# Patient Record
Sex: MALE | Race: OTHER | NOT HISPANIC OR LATINO
[De-identification: names, ages, dates, MRNs, and addresses within clinical notes are randomized per-mention and may not be internally consistent; named-entity substitution may affect disease eponyms.]

---

## 2021-01-04 PROBLEM — Z00.00 ENCOUNTER FOR PREVENTIVE HEALTH EXAMINATION: Status: ACTIVE | Noted: 2021-01-04

## 2021-01-07 ENCOUNTER — APPOINTMENT (OUTPATIENT)
Dept: ORTHOPEDIC SURGERY | Facility: CLINIC | Age: 66
End: 2021-01-07
Payer: COMMERCIAL

## 2021-01-07 VITALS
DIASTOLIC BLOOD PRESSURE: 95 MMHG | HEART RATE: 67 BPM | BODY MASS INDEX: 28.93 KG/M2 | SYSTOLIC BLOOD PRESSURE: 155 MMHG | WEIGHT: 180 LBS | HEIGHT: 66 IN

## 2021-01-07 DIAGNOSIS — M25.562 PAIN IN LEFT KNEE: ICD-10-CM

## 2021-01-07 DIAGNOSIS — M17.12 UNILATERAL PRIMARY OSTEOARTHRITIS, LEFT KNEE: ICD-10-CM

## 2021-01-07 PROCEDURE — 99072 ADDL SUPL MATRL&STAF TM PHE: CPT

## 2021-01-07 PROCEDURE — 99204 OFFICE O/P NEW MOD 45 MIN: CPT | Mod: 25

## 2021-01-07 PROCEDURE — 20610 DRAIN/INJ JOINT/BURSA W/O US: CPT | Mod: LT

## 2021-01-07 PROCEDURE — 73564 X-RAY EXAM KNEE 4 OR MORE: CPT | Mod: LT

## 2021-01-07 NOTE — REASON FOR VISIT
[Initial Visit] : an initial visit for [Other: ____] : [unfilled] [[employer] ] : provided by [employer]   [TWNoteComboBox1] : Polish

## 2021-01-07 NOTE — PHYSICAL EXAM
[de-identified] : The patient appears well nourished  and in no apparent distress.  The patient is alert and oriented to person, place, and time.   Affect and mood appear normal. The head is normocephalic and atraumatic.  The eyes reveal normal sclera and extra ocular muscles are intact. The tongue is midline with no apparent lesions.  Skin shows normal turgor with no evidence of eczema or psoriasis.  No respiratory distress noted.  Sensation grossly intact.\par   [de-identified] : Exam of the left knee shows a small effusion, full extension, pain with Jairo testing, tenderness to palpation of the medial joint line, pain with deep flexion to 120 degrees of flexion. 5/5 motor strength bilaterally distally. Sensation intact distally.  [de-identified] : Xray- 4 views of the left knee shows moderate to severe arthritis of the medial compartment of the left knee.

## 2021-01-07 NOTE — CONSULT LETTER
[Dear  ___] : Dear  [unfilled], [Consult Letter:] : I had the pleasure of evaluating your patient, [unfilled]. [Please see my note below.] : Please see my note below. [Consult Closing:] : Thank you very much for allowing me to participate in the care of this patient.  If you have any questions, please do not hesitate to contact me. [Sincerely,] : Sincerely, [FreeTextEntry2] : Susan Murphy MD [FreeTextEntry3] : Rudy Ayala MD\par Chief of Joint Replacement\par Primary [facility] Knee Replacement \par [facility]\par \par

## 2021-01-07 NOTE — DISCUSSION/SUMMARY
[de-identified] : The patient is a 65 year old male with moderate to severe arthritis of the medial compartment of the left knee. Conservative options were discussed. He was given a cortisone injection in the left knee today. He was given a prescription for physical therapy and anti-inflammatories. I recommended a course of Mobic. The patient was given a prescription for the Mobic with directions. He was instructed to stop the medicine and call the office if there are any adverse reaction to the medicine. He was also instructed to consult with their primary care doctor prior to starting the medication. He may follow up as needed.

## 2021-01-07 NOTE — PROCEDURE
[de-identified] : Using sterile technique, 2cc of depomedrol 40mg/ml, 4cc of 1% plain lidocaine, and 2 cc 0.25% marcaine was drawn up into a sterile syringe. The left knee was then sterilely prepped with chlorhexidine. Ethyl chloride spray was used to anesthetize the skin and subQ tissue. The depomedrol/lidocaine/marcaine mixture was then injected into the knee joint in the anterolateral position. The patient tolerated the procedure well without difficulty. The patient was given instructions on the use of ice and anti-inflammatories post injection site soreness.\par \par

## 2021-01-07 NOTE — ADDENDUM
[FreeTextEntry1] : This note was authored by David Russell working as a medical scribe for Dr. Rudy Ayala. The note was reviewed, edited, and revised by Dr. Rudy Ayala whom is in agreement with the exam findings, imaging findings, and treatment plan. 01/07/2021.

## 2021-01-07 NOTE — HISTORY OF PRESENT ILLNESS
[de-identified] : The patient is a 65 year old American-speaking male being seen for evaluation of his left knee. He denies injury, trauma, or change of activity. He reports a 3-4 month duration of left knee pain which is intermittent in nature. He reports stabbing and throbbing sensations which can be severe at times. He reports pain increases with activities such as walking for long durations of time. He reports pain is localized to the medial knee, however it can be global at times. He notes swelling at times. Pain is worse with transferring and stair climbing. He comes in today for evaluation of his left knee and for treatment options.

## 2021-02-02 ENCOUNTER — RX RENEWAL (OUTPATIENT)
Age: 66
End: 2021-02-02

## 2021-03-02 ENCOUNTER — RX RENEWAL (OUTPATIENT)
Age: 66
End: 2021-03-02

## 2021-03-30 ENCOUNTER — RX RENEWAL (OUTPATIENT)
Age: 66
End: 2021-03-30

## 2021-04-27 ENCOUNTER — RX RENEWAL (OUTPATIENT)
Age: 66
End: 2021-04-27

## 2023-08-01 ENCOUNTER — INPATIENT (INPATIENT)
Facility: HOSPITAL | Age: 68
LOS: 1 days | Discharge: HOME HEALTH | DRG: 694 | End: 2023-08-03
Attending: STUDENT IN AN ORGANIZED HEALTH CARE EDUCATION/TRAINING PROGRAM | Admitting: HOSPITALIST
Payer: COMMERCIAL

## 2023-08-01 VITALS
RESPIRATION RATE: 18 BRPM | DIASTOLIC BLOOD PRESSURE: 80 MMHG | OXYGEN SATURATION: 99 % | WEIGHT: 163.36 LBS | SYSTOLIC BLOOD PRESSURE: 127 MMHG | HEART RATE: 56 BPM | TEMPERATURE: 98 F

## 2023-08-01 LAB
ALBUMIN SERPL ELPH-MCNC: 3.2 G/DL — LOW (ref 3.3–5.2)
ALP SERPL-CCNC: 105 U/L — SIGNIFICANT CHANGE UP (ref 40–120)
ALT FLD-CCNC: 31 U/L — SIGNIFICANT CHANGE UP
ANION GAP SERPL CALC-SCNC: 14 MMOL/L — SIGNIFICANT CHANGE UP (ref 5–17)
APPEARANCE UR: ABNORMAL
AST SERPL-CCNC: 33 U/L — SIGNIFICANT CHANGE UP
BACTERIA # UR AUTO: ABNORMAL
BASOPHILS # BLD AUTO: 0.03 K/UL — SIGNIFICANT CHANGE UP (ref 0–0.2)
BASOPHILS NFR BLD AUTO: 0.3 % — SIGNIFICANT CHANGE UP (ref 0–2)
BILIRUB SERPL-MCNC: 0.5 MG/DL — SIGNIFICANT CHANGE UP (ref 0.4–2)
BILIRUB UR-MCNC: NEGATIVE — SIGNIFICANT CHANGE UP
BUN SERPL-MCNC: 15.6 MG/DL — SIGNIFICANT CHANGE UP (ref 8–20)
CALCIUM SERPL-MCNC: 8.5 MG/DL — SIGNIFICANT CHANGE UP (ref 8.4–10.5)
CHLORIDE SERPL-SCNC: 91 MMOL/L — LOW (ref 96–108)
CO2 SERPL-SCNC: 25 MMOL/L — SIGNIFICANT CHANGE UP (ref 22–29)
COLOR SPEC: YELLOW — SIGNIFICANT CHANGE UP
CREAT SERPL-MCNC: 1.28 MG/DL — SIGNIFICANT CHANGE UP (ref 0.5–1.3)
DIFF PNL FLD: ABNORMAL
EGFR: 62 ML/MIN/1.73M2 — SIGNIFICANT CHANGE UP
EOSINOPHIL # BLD AUTO: 0.1 K/UL — SIGNIFICANT CHANGE UP (ref 0–0.5)
EOSINOPHIL NFR BLD AUTO: 1 % — SIGNIFICANT CHANGE UP (ref 0–6)
EPI CELLS # UR: SIGNIFICANT CHANGE UP
GLUCOSE SERPL-MCNC: 338 MG/DL — HIGH (ref 70–99)
GLUCOSE UR QL: 250 MG/DL
HCT VFR BLD CALC: 38.4 % — LOW (ref 39–50)
HGB BLD-MCNC: 13 G/DL — SIGNIFICANT CHANGE UP (ref 13–17)
IMM GRANULOCYTES NFR BLD AUTO: 0.9 % — SIGNIFICANT CHANGE UP (ref 0–0.9)
KETONES UR-MCNC: NEGATIVE — SIGNIFICANT CHANGE UP
LACTATE BLDV-MCNC: 1.7 MMOL/L — SIGNIFICANT CHANGE UP (ref 0.5–2)
LACTATE BLDV-MCNC: 3 MMOL/L — HIGH (ref 0.5–2)
LEUKOCYTE ESTERASE UR-ACNC: NEGATIVE — SIGNIFICANT CHANGE UP
LIDOCAIN IGE QN: 45 U/L — SIGNIFICANT CHANGE UP (ref 22–51)
LYMPHOCYTES # BLD AUTO: 1.1 K/UL — SIGNIFICANT CHANGE UP (ref 1–3.3)
LYMPHOCYTES # BLD AUTO: 11.2 % — LOW (ref 13–44)
MCHC RBC-ENTMCNC: 29.3 PG — SIGNIFICANT CHANGE UP (ref 27–34)
MCHC RBC-ENTMCNC: 33.9 GM/DL — SIGNIFICANT CHANGE UP (ref 32–36)
MCV RBC AUTO: 86.5 FL — SIGNIFICANT CHANGE UP (ref 80–100)
MONOCYTES # BLD AUTO: 1.01 K/UL — HIGH (ref 0–0.9)
MONOCYTES NFR BLD AUTO: 10.2 % — SIGNIFICANT CHANGE UP (ref 2–14)
NEUTROPHILS # BLD AUTO: 7.53 K/UL — HIGH (ref 1.8–7.4)
NEUTROPHILS NFR BLD AUTO: 76.4 % — SIGNIFICANT CHANGE UP (ref 43–77)
NITRITE UR-MCNC: NEGATIVE — SIGNIFICANT CHANGE UP
PH UR: 5 — SIGNIFICANT CHANGE UP (ref 5–8)
PLATELET # BLD AUTO: 300 K/UL — SIGNIFICANT CHANGE UP (ref 150–400)
POTASSIUM SERPL-MCNC: 4.3 MMOL/L — SIGNIFICANT CHANGE UP (ref 3.5–5.3)
POTASSIUM SERPL-SCNC: 4.3 MMOL/L — SIGNIFICANT CHANGE UP (ref 3.5–5.3)
PROT SERPL-MCNC: 6.6 G/DL — SIGNIFICANT CHANGE UP (ref 6.6–8.7)
PROT UR-MCNC: 30 MG/DL
RBC # BLD: 4.44 M/UL — SIGNIFICANT CHANGE UP (ref 4.2–5.8)
RBC # FLD: 12.9 % — SIGNIFICANT CHANGE UP (ref 10.3–14.5)
RBC CASTS # UR COMP ASSIST: SIGNIFICANT CHANGE UP /HPF (ref 0–4)
SODIUM SERPL-SCNC: 130 MMOL/L — LOW (ref 135–145)
SP GR SPEC: 1.01 — SIGNIFICANT CHANGE UP (ref 1.01–1.02)
UROBILINOGEN FLD QL: NEGATIVE MG/DL — SIGNIFICANT CHANGE UP
WBC # BLD: 9.86 K/UL — SIGNIFICANT CHANGE UP (ref 3.8–10.5)
WBC # FLD AUTO: 9.86 K/UL — SIGNIFICANT CHANGE UP (ref 3.8–10.5)
WBC UR QL: SIGNIFICANT CHANGE UP /HPF (ref 0–5)

## 2023-08-01 PROCEDURE — 99223 1ST HOSP IP/OBS HIGH 75: CPT

## 2023-08-01 PROCEDURE — 72131 CT LUMBAR SPINE W/O DYE: CPT | Mod: 26,MA

## 2023-08-01 PROCEDURE — 93010 ELECTROCARDIOGRAM REPORT: CPT

## 2023-08-01 PROCEDURE — 74177 CT ABD & PELVIS W/CONTRAST: CPT | Mod: 26,MA

## 2023-08-01 RX ORDER — SODIUM CHLORIDE 9 MG/ML
1000 INJECTION INTRAMUSCULAR; INTRAVENOUS; SUBCUTANEOUS ONCE
Refills: 0 | Status: COMPLETED | OUTPATIENT
Start: 2023-08-01 | End: 2023-08-01

## 2023-08-01 RX ORDER — ATORVASTATIN CALCIUM 80 MG/1
10 TABLET, FILM COATED ORAL AT BEDTIME
Refills: 0 | Status: DISCONTINUED | OUTPATIENT
Start: 2023-08-01 | End: 2023-08-02

## 2023-08-01 RX ORDER — MORPHINE SULFATE 50 MG/1
4 CAPSULE, EXTENDED RELEASE ORAL ONCE
Refills: 0 | Status: DISCONTINUED | OUTPATIENT
Start: 2023-08-01 | End: 2023-08-01

## 2023-08-01 RX ORDER — ONDANSETRON 8 MG/1
4 TABLET, FILM COATED ORAL ONCE
Refills: 0 | Status: COMPLETED | OUTPATIENT
Start: 2023-08-01 | End: 2023-08-01

## 2023-08-01 RX ORDER — LISINOPRIL 2.5 MG/1
20 TABLET ORAL DAILY
Refills: 0 | Status: DISCONTINUED | OUTPATIENT
Start: 2023-08-01 | End: 2023-08-02

## 2023-08-01 RX ORDER — ATENOLOL 25 MG/1
50 TABLET ORAL DAILY
Refills: 0 | Status: DISCONTINUED | OUTPATIENT
Start: 2023-08-01 | End: 2023-08-02

## 2023-08-01 RX ADMIN — SODIUM CHLORIDE 1000 MILLILITER(S): 9 INJECTION INTRAMUSCULAR; INTRAVENOUS; SUBCUTANEOUS at 06:57

## 2023-08-01 RX ADMIN — ONDANSETRON 4 MILLIGRAM(S): 8 TABLET, FILM COATED ORAL at 04:51

## 2023-08-01 RX ADMIN — MORPHINE SULFATE 4 MILLIGRAM(S): 50 CAPSULE, EXTENDED RELEASE ORAL at 04:51

## 2023-08-01 RX ADMIN — SODIUM CHLORIDE 1000 MILLILITER(S): 9 INJECTION INTRAMUSCULAR; INTRAVENOUS; SUBCUTANEOUS at 05:38

## 2023-08-01 RX ADMIN — ATORVASTATIN CALCIUM 10 MILLIGRAM(S): 80 TABLET, FILM COATED ORAL at 21:15

## 2023-08-01 NOTE — ED PROVIDER NOTE - NS ED ATTENDING STATEMENT MOD
This was a shared visit with the SYBIL. I reviewed and verified the documentation and independently performed the documented:

## 2023-08-01 NOTE — ED ADULT TRIAGE NOTE - CHIEF COMPLAINT QUOTE
c/o of left lower back pain. Was dx with a back tumor in [facility] a few days ago. Pt reports pain is unbearable

## 2023-08-01 NOTE — ED ADULT NURSE REASSESSMENT NOTE - NS ED NURSE REASSESS COMMENT FT1
pt care assumed at 0730, no apparent distress noted at this time, charting as noted. Pt received A&Ox4 resting in bed comfortably at this time. Pt is awaiting CT. Pt states pain is controlled on initial assessment.

## 2023-08-01 NOTE — ED PROVIDER NOTE - CLINICAL SUMMARY MEDICAL DECISION MAKING FREE TEXT BOX
67 year old male with med hx of htn presented to ED c/o left low back pain. r/o colon mass, GI bleed, kidney stone, spinal tumor 67 year old male with med hx of htn presented to ED c/o left low back pain. r/o colon mass, GI bleed, kidney stone, spinal tumor    [place]: CT with left ureteral mass. urology recommending IR nephrostomy tube. Able to place PCN tomorrow. Placed in observation

## 2023-08-01 NOTE — ED ADULT NURSE NOTE - TEMPLATE
What Type Of Note Output Would You Prefer (Optional)?: Standard Output How Severe Is Your Acne?: moderate Is This A New Presentation, Or A Follow-Up?: Acne Back Pain

## 2023-08-01 NOTE — ED ADULT NURSE NOTE - OBJECTIVE STATEMENT
Assumed pt care 0445. A&Ox4. PT granddaughter at bedside. Pt c/o left worsening lower back pain associated with nausea. Pt states he returned from [facility] today where he was diagnosed with color cancer. Pt denies fevers/chills, SOB, HA, vomiting/diarrhea, cough. PMH of HTN. Respirations even & unlabored. NAD. Pt made aware of plan of care and verbalized understanding.

## 2023-08-01 NOTE — ED PROVIDER NOTE - ATTENDING APP SHARED VISIT CONTRIBUTION OF CARE
non toxic appearing adult male with lower back pain in setting of recent diagnosis of metastatic CA at outside hospital; non toxic, no icterus, normal mucosa; normal heart and lung sounds; abd softly distended; no asia no guarding; no jaundice; awaiting final ct imaging at time of sign out    This was a shared visit with SYBIL. I reviewed and verified the documentation and independently performed the documented history/exam/mdm.

## 2023-08-01 NOTE — ED ADULT NURSE REASSESSMENT NOTE - NS ED NURSE REASSESS COMMENT FT1
Received pt from ED RN, pt alert and oriented, in NAD, breathing is even and unlabored. PT offers no complaints at this time, will continue to follow POC

## 2023-08-01 NOTE — ED CDU PROVIDER INITIAL DAY NOTE - CLINICAL SUMMARY MEDICAL DECISION MAKING FREE TEXT BOX
Abnormal CT: +Apparent 0.7 x 1.1 x 1.8 cm enhancing mass lesion in the left distal ureter with associated moderate to severe left hydroureteronephrosis.  Apparent mural thickening versus underdistention of the stomach. If clinically indicated, EGD may be pursued for further evaluation. Mild ascites in the abdomen and pelvis. Omental densities bilaterally may represent omental carcinomatosis.    Seen by urology, no acute intervention, recommend outpatient cystoscopy    Case reviewed by IR, for nephrostomy tube placement

## 2023-08-01 NOTE — ED ADULT NURSE REASSESSMENT NOTE - NS ED NURSE REASSESS COMMENT FT1
pt resting comfortably on stretcher with eyes closed. no acute distress, rounds frequently provided for pt comfort and safety. does not offer acute complaints. to be NPO at midnight awaiting IR 8/2/203.

## 2023-08-01 NOTE — ED ADULT NURSE REASSESSMENT NOTE - NS ED NURSE REASSESS COMMENT FT1
pt handed off to GAURANG Knox in stable condition. pt in no apparent distress noted at this time. vital signs stable. pt transferred to obs in stable condition.

## 2023-08-01 NOTE — ED CDU PROVIDER INITIAL DAY NOTE - ATTENDING APP SHARED VISIT CONTRIBUTION OF CARE
I, Nolberto Martinez, have personally performed a face to face diagnostic evaluation on this patient. I have reviewed the SYBIL note and agree with the history, exam and plan of care, except as noted.    66 yo M  presents to the ED with 2 weeks of left flank pain. CT showed mass to the distal urether causing hydronephoriss. patient seen by urology. will need nephrostomy tube. renal function wnl. patient placed in Obs for IR nephrostomy tomorrow.

## 2023-08-01 NOTE — ED PROVIDER NOTE - PHYSICAL EXAMINATION
Physical Examination:   Gen: NAD, AOx3  Head: NCAT  HEENT: EOMI, oral mucosa moist, normal conjunctiva, neck supple  Lung: no respiratory distress  CV:  Normal perfusion  Abd: soft, NT ND  MSK: + left cva tenderness   Neuro: No focal neurologic deficits  Skin: No rash   Psych: normal affect

## 2023-08-01 NOTE — CONSULT NOTE ADULT - NS ATTEND AMEND GEN_ALL_CORE FT
Given the radiographic studies and the delfina distal left ureteral tumor he needs left neph tube to protect renal function on left.  The patient will likely require chemo and may need a distal ureterectomy.  Will discuss further once initial resuscitation done.

## 2023-08-01 NOTE — CONSULT NOTE ADULT - ASSESSMENT
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 Gregory
Patient is 67-year-old male admitted to observation with left distal ureteral mass and hydroureteronephrosis. IR consulted for left nephrostomy tube placement. Labs and imaging reviewed by IR attending. We will plan for nephrostomy placement in AM. Please keep patient NPO. Please hold all anticoagulation medication    Please call extension [phone] with any questions, concerns or issues regarding above.

## 2023-08-01 NOTE — ED ADULT NURSE REASSESSMENT NOTE - NS ED NURSE REASSESS COMMENT FT1
Pt is resting in bed comfortably at this time, no apparent distress noted at this time. pt safety maintained. Pt denies any complaints at this time. pt updated on plan of care.

## 2023-08-01 NOTE — ED PROVIDER NOTE - PROGRESS NOTE DETAILS
pt received in s/o pending CT. CT with mass at the left ureter with hydro. Cr 1.2. On labs from last week in [facility], Cr 2.2.   Notified urology, who will eval pt.

## 2023-08-01 NOTE — ED ADULT NURSE NOTE - CHIEF COMPLAINT QUOTE
c/o of left lower back pain. Was dx with a back tumor in [facility] a few days ago. Pt reports pain is unbearable

## 2023-08-01 NOTE — ED CDU PROVIDER INITIAL DAY NOTE - OBJECTIVE STATEMENT
68 yo male presents to the ED with 2 weeks of left flank pain.  Pt was in [place] [place] on vacation when the pain started.  Pt went to an urgent care and was sent to the hospital.  He was told there was a mass ? colon. His creatinine was 2.2, now 1.28 today.  Pt's son advised he come back to [place] for further work up and treatment.  Upon further interviewing of the patient, he stated that when in the hospital in [place], he had a tube placed in his abdomen that was draining a lot of fluid. Pt describing 3L of fluid, was removed the following day.  He also had a hard time moving his bowels.  Pt has nausea when the pain is severe.  Pt denies any hematuria, voids w/o difficulty.  He was a smoker a long time ago.  No fever or chills. CT scan today shows mod to sev hydronephrosis on the left and a distal ureteral mass causing the hydro. Possible omental carcinomatosis.

## 2023-08-01 NOTE — ED PROVIDER NOTE - OBJECTIVE STATEMENT
67 year old male with no med hx presented to ED c/o left low back pain. daughter states he was recently visiting [facility], 67 year old male with med hx of htn presented to ED c/o left low back pain. daughter states he was recently visiting [facility], was experiencing left low back pain, reported to hospital. was diagnosed with "colon cancer". came back to [place] for additional opinions. flew back today. pain has now increased. admits to nausea, vomiting. denies chest pain, sob, abdominal pain, numbness, tingling, fever/chills, trauma/ injury. has never followed with GI or cards. unknown last colonoscopy. contray to triage was not diagnosed with spinal mass. denies IVDU, incontinence, saddle anesthesia   has been taking pain meds from hospital unsure names

## 2023-08-01 NOTE — ED ADULT NURSE NOTE - NSFALLUNIVINTERV_ED_ALL_ED
Bed/Stretcher in lowest position, wheels locked, appropriate side rails in place/Call bell, personal items and telephone in reach/Instruct patient to call for assistance before getting out of bed/chair/stretcher/Non-slip footwear applied when patient is off stretcher/[place] to call system/Physically safe environment - no spills, clutter or unnecessary equipment/Purposeful proactive rounding/Room/bathroom lighting operational, light cord in reach

## 2023-08-02 DIAGNOSIS — N28.89 OTHER SPECIFIED DISORDERS OF KIDNEY AND URETER: ICD-10-CM

## 2023-08-02 LAB
ALBUMIN SERPL ELPH-MCNC: 2.8 G/DL — LOW (ref 3.3–5.2)
ALP SERPL-CCNC: 129 U/L — HIGH (ref 40–120)
ALT FLD-CCNC: 60 U/L — HIGH
ANION GAP SERPL CALC-SCNC: 10 MMOL/L — SIGNIFICANT CHANGE UP (ref 5–17)
APTT BLD: 26 SEC — SIGNIFICANT CHANGE UP (ref 24.5–35.6)
AST SERPL-CCNC: 54 U/L — HIGH
BILIRUB SERPL-MCNC: 0.4 MG/DL — SIGNIFICANT CHANGE UP (ref 0.4–2)
BUN SERPL-MCNC: 16 MG/DL — SIGNIFICANT CHANGE UP (ref 8–20)
CALCIUM SERPL-MCNC: 8.3 MG/DL — LOW (ref 8.4–10.5)
CHLORIDE SERPL-SCNC: 95 MMOL/L — LOW (ref 96–108)
CO2 SERPL-SCNC: 26 MMOL/L — SIGNIFICANT CHANGE UP (ref 22–29)
CREAT SERPL-MCNC: 1.75 MG/DL — HIGH (ref 0.5–1.3)
EGFR: 43 ML/MIN/1.73M2 — LOW
GLUCOSE SERPL-MCNC: 186 MG/DL — HIGH (ref 70–99)
INR BLD: 0.94 RATIO — SIGNIFICANT CHANGE UP (ref 0.85–1.18)
POTASSIUM SERPL-MCNC: 5.2 MMOL/L — SIGNIFICANT CHANGE UP (ref 3.5–5.3)
POTASSIUM SERPL-SCNC: 5.2 MMOL/L — SIGNIFICANT CHANGE UP (ref 3.5–5.3)
PROT SERPL-MCNC: 5.6 G/DL — LOW (ref 6.6–8.7)
PROTHROM AB SERPL-ACNC: 10.4 SEC — SIGNIFICANT CHANGE UP (ref 9.5–13)
SODIUM SERPL-SCNC: 131 MMOL/L — LOW (ref 135–145)

## 2023-08-02 PROCEDURE — 50432 PLMT NEPHROSTOMY CATHETER: CPT | Mod: LT

## 2023-08-02 PROCEDURE — 99223 1ST HOSP IP/OBS HIGH 75: CPT

## 2023-08-02 PROCEDURE — 99233 SBSQ HOSP IP/OBS HIGH 50: CPT

## 2023-08-02 RX ORDER — AMLODIPINE BESYLATE 2.5 MG/1
5 TABLET ORAL DAILY
Refills: 0 | Status: DISCONTINUED | OUTPATIENT
Start: 2023-08-02 | End: 2023-08-03

## 2023-08-02 RX ORDER — SENNA PLUS 8.6 MG/1
2 TABLET ORAL AT BEDTIME
Refills: 0 | Status: DISCONTINUED | OUTPATIENT
Start: 2023-08-02 | End: 2023-08-03

## 2023-08-02 RX ORDER — POLYETHYLENE GLYCOL 3350 17 G/17G
17 POWDER, FOR SOLUTION ORAL DAILY
Refills: 0 | Status: DISCONTINUED | OUTPATIENT
Start: 2023-08-02 | End: 2023-08-03

## 2023-08-02 RX ORDER — ACETAMINOPHEN 500 MG
650 TABLET ORAL EVERY 6 HOURS
Refills: 0 | Status: DISCONTINUED | OUTPATIENT
Start: 2023-08-02 | End: 2023-08-03

## 2023-08-02 RX ORDER — OLMESARTAN MEDOXOMIL 5 MG/1
1 TABLET, FILM COATED ORAL
Refills: 0 | DISCHARGE

## 2023-08-02 RX ADMIN — SENNA PLUS 2 TABLET(S): 8.6 TABLET ORAL at 22:04

## 2023-08-02 RX ADMIN — ATENOLOL 50 MILLIGRAM(S): 25 TABLET ORAL at 05:06

## 2023-08-02 NOTE — ED ADULT NURSE REASSESSMENT NOTE - NS ED NURSE REASSESS COMMENT FT1
pt resting comfortably on stretcher with eyes closed. no acute distress, rounds frequently provided for pt comfort and safety. does not offer acute complaints, denies pain at this time. NPO status maintained awaiting IR today.

## 2023-08-02 NOTE — ED CDU PROVIDER DISPOSITION NOTE - CLINICAL COURSE
66yo M presents to ED for progressive back pain. found to have new findings for hydronephrosis and uretal mass on CT. placed in OBS for uro consult. uro recommended nephrostomy tube with IR. while in obs found to have elevated LFT. pt to be admitted due to concerns for poor outpt f/u. pt progressively declining and has poor insight. oncology, GI and nephrology consulted. reviewed all results with pt. pt verbalized understanding and agreement

## 2023-08-02 NOTE — ED CDU PROVIDER SUBSEQUENT DAY NOTE - ATTENDING APP SHARED VISIT CONTRIBUTION OF CARE
Elayne MELGAR 67-year-old male with history of abdominal pain and and found to have an abdominal mass in the left kidney.  Patient was placed in observation for IR drainage and nephrostomy by IR.  Patient is feeling weak and tired    Patient is alert well-appearing male in no acute distress no events overnight neuro exam is alert oriented x3 no focal deficits skin warm dry good turgor    Plan to admit the patient as patient has deranged LFTs and had a recent paracentesis also has low albumin and need oncology work-up.  Patient has not seen any oncologist urology also recommended to admit to medicine.    ED  Kelsi Cash

## 2023-08-02 NOTE — H&P ADULT - HISTORY OF PRESENT ILLNESS
67M who presented with left sided back pain.  67M who presented with left sided back pain. He had been on vacation in [facility] when the symptoms began. He was evaluated at the hospital and underwent paracentesis and imaging and was informed of concern for possible colon cancer. He return to [place] and presented to the hospital with persistent pain. Upon initial evaluation, he was noted to have left sided hydronephrosis and was placed under observational status in th emergency department with plans for a nephrostomy tube. The patient denied any change in urination, hematuria, or dysuria. There were no recent fevers, chills, or sick contacts. He did attest to a 10 pound weight loss over the past two weeks. He denied any change in his bowel habits. He reported a colonoscopy four years ago and was scheduled for a repeat colonoscopy in ten years. He denied any history of scites in the past and denied any episodes of jaundice.

## 2023-08-02 NOTE — PROGRESS NOTE ADULT - ASSESSMENT
66 yo male with ureteral mass, left hydronephrosis, DESI  - IR for placement on L NT today  - trend renal fcn  - if renal fcn worsens, rec medical admission for IVF and renal fcn monitoring  - if pt remains stable and able to be d/c'd, pt need to f/u with Dr. Cliff Gregory in 1 week

## 2023-08-02 NOTE — H&P ADULT - NSHPPHYSICALEXAM_GEN_ALL_CORE
Vital Signs Last 24 Hrs  T(C): 36.7 (02 Aug 2023 08:29), Max: 36.8 (01 Aug 2023 19:55)  T(F): 98 (02 Aug 2023 08:29), Max: 98.3 (01 Aug 2023 19:55)  HR: 66 (02 Aug 2023 08:29) (66 - 95)  BP: 116/74 (02 Aug 2023 08:29) (101/69 - 148/85)  BP(mean): --  RR: 18 (02 Aug 2023 08:29) (16 - 18)  SpO2: 98% (02 Aug 2023 08:29) (96% - 98%)    Parameters below as of 02 Aug 2023 08:29  Patient On (Oxygen Delivery Method): room air    General appearance: No acute distress, Awake, Alert  HEENT: Normocephalic, Atraumatic, Conjunctiva clear, EOMI  Neck: Supple, No JVD, No tenderness  Lungs: Breath sound equal bilaterally, No wheezes, No rales  Cardiovascular: S1S2, Regular rhythm  Abdomen: Soft, Nontender, Nondistended, No guarding/rebound, Positive bowel sounds, Left costovertebral angle tenderness  Extremities: No clubbing, No cyanosis, No edema, No calf tenderness  Neuro: Strength equal bilaterally, No tremors  Psychiatric: Appropriate mood, Normal affect

## 2023-08-02 NOTE — ED CDU PROVIDER DISPOSITION NOTE - ATTENDING CONTRIBUTION TO CARE
pt admitted to medicine for multidisciplinary care for cancer and recent neohrostomy plan with poor follow up and low health literacy

## 2023-08-02 NOTE — ED CDU PROVIDER SUBSEQUENT DAY NOTE - CLINICAL SUMMARY MEDICAL DECISION MAKING FREE TEXT BOX
67 year old male with hx of htn presented to ED c/o left low back pain found to have mass in left ureter followed by urology planned to have a left nephrostomy placed tomorrow by [facility]

## 2023-08-02 NOTE — H&P ADULT - ASSESSMENT
67M with a history of hypertension who was found to have ascites in [facility] presented with persistent left flank pain found to have hydronephrosis due to a ureteral lesion as well as omental densities suspicious for underlying malignancy.    Hydronephrosis - Urology consultation noted. Planned for nephrostomy tube insertion today by Interventional Radiology.    Acute kidney injury / Hyponatremia - Lisinopril and hydrochlorothiazide were initiated in the emergency department. To hold the medications for now. The patient's son reported that he was only on Benicar/Amlodipine at home. Continue to monitor renal function after nephrostomy tube placement.    Hypertension - Close blood pressure monitoring. Continue on amlodipine.    Transaminitis - Mild. Hepatic steatosis noted on CT of the abdomen,    Omental densities - Suspicious for underlying malignancy. The patient had a colonoscopy four years prior which was reported to be negative and advised for follow up study in ten years. Tumor markers requested. Discussed with Interventional Radiology. Small amount of ascites noted, unclear if able to get a tissue sample from the omentum. Will have to reassess after the nephrostomy tube insertion.    Discussed with the patient and his son at the bedside.

## 2023-08-02 NOTE — H&P ADULT - NSHPSOCIALHISTORY_GEN_ALL_CORE
Prior tobacco use  Denied alcohol or illicit drug use    PMH: hypertension  PSH: Cholecystectomy, Knee surgery  Family History: Brother with brain cancer

## 2023-08-03 ENCOUNTER — TRANSCRIPTION ENCOUNTER (OUTPATIENT)
Age: 68
End: 2023-08-03

## 2023-08-03 VITALS
HEART RATE: 72 BPM | RESPIRATION RATE: 18 BRPM | TEMPERATURE: 98 F | OXYGEN SATURATION: 98 % | SYSTOLIC BLOOD PRESSURE: 118 MMHG | DIASTOLIC BLOOD PRESSURE: 78 MMHG

## 2023-08-03 DIAGNOSIS — R18.8 OTHER ASCITES: ICD-10-CM

## 2023-08-03 DIAGNOSIS — R93.5 ABNORMAL FINDINGS ON DIAGNOSTIC IMAGING OF OTHER ABDOMINAL REGIONS, INCLUDING RETROPERITONEUM: ICD-10-CM

## 2023-08-03 LAB
ANION GAP SERPL CALC-SCNC: 11 MMOL/L — SIGNIFICANT CHANGE UP (ref 5–17)
BUN SERPL-MCNC: 21.2 MG/DL — HIGH (ref 8–20)
CALCIUM SERPL-MCNC: 8.2 MG/DL — LOW (ref 8.4–10.5)
CANCER AG19-9 SERPL-ACNC: 6 U/ML — SIGNIFICANT CHANGE UP
CEA SERPL-MCNC: 10.6 NG/ML — HIGH (ref 0–3.8)
CHLORIDE SERPL-SCNC: 96 MMOL/L — SIGNIFICANT CHANGE UP (ref 96–108)
CO2 SERPL-SCNC: 25 MMOL/L — SIGNIFICANT CHANGE UP (ref 22–29)
CREAT SERPL-MCNC: 1.4 MG/DL — HIGH (ref 0.5–1.3)
EGFR: 56 ML/MIN/1.73M2 — LOW
GLUCOSE SERPL-MCNC: 196 MG/DL — HIGH (ref 70–99)
HAV IGM SER-ACNC: SIGNIFICANT CHANGE UP
HBV CORE IGM SER-ACNC: SIGNIFICANT CHANGE UP
HBV SURFACE AG SER-ACNC: SIGNIFICANT CHANGE UP
HCT VFR BLD CALC: 36.3 % — LOW (ref 39–50)
HCV AB S/CO SERPL IA: 0.13 S/CO — SIGNIFICANT CHANGE UP (ref 0–0.99)
HCV AB SERPL-IMP: SIGNIFICANT CHANGE UP
HGB BLD-MCNC: 12.1 G/DL — LOW (ref 13–17)
MCHC RBC-ENTMCNC: 29.1 PG — SIGNIFICANT CHANGE UP (ref 27–34)
MCHC RBC-ENTMCNC: 33.3 GM/DL — SIGNIFICANT CHANGE UP (ref 32–36)
MCV RBC AUTO: 87.3 FL — SIGNIFICANT CHANGE UP (ref 80–100)
PLATELET # BLD AUTO: 260 K/UL — SIGNIFICANT CHANGE UP (ref 150–400)
POTASSIUM SERPL-MCNC: 4.9 MMOL/L — SIGNIFICANT CHANGE UP (ref 3.5–5.3)
POTASSIUM SERPL-SCNC: 4.9 MMOL/L — SIGNIFICANT CHANGE UP (ref 3.5–5.3)
RBC # BLD: 4.16 M/UL — LOW (ref 4.2–5.8)
RBC # FLD: 12.8 % — SIGNIFICANT CHANGE UP (ref 10.3–14.5)
SODIUM SERPL-SCNC: 132 MMOL/L — LOW (ref 135–145)
WBC # BLD: 8.54 K/UL — SIGNIFICANT CHANGE UP (ref 3.8–10.5)
WBC # FLD AUTO: 8.54 K/UL — SIGNIFICANT CHANGE UP (ref 3.8–10.5)

## 2023-08-03 PROCEDURE — 96374 THER/PROPH/DIAG INJ IV PUSH: CPT

## 2023-08-03 PROCEDURE — 76705 ECHO EXAM OF ABDOMEN: CPT

## 2023-08-03 PROCEDURE — 87086 URINE CULTURE/COLONY COUNT: CPT

## 2023-08-03 PROCEDURE — 99233 SBSQ HOSP IP/OBS HIGH 50: CPT

## 2023-08-03 PROCEDURE — 86301 IMMUNOASSAY TUMOR CA 19-9: CPT

## 2023-08-03 PROCEDURE — 74177 CT ABD & PELVIS W/CONTRAST: CPT | Mod: MA

## 2023-08-03 PROCEDURE — 85730 THROMBOPLASTIN TIME PARTIAL: CPT

## 2023-08-03 PROCEDURE — 80074 ACUTE HEPATITIS PANEL: CPT

## 2023-08-03 PROCEDURE — 85027 COMPLETE CBC AUTOMATED: CPT

## 2023-08-03 PROCEDURE — C1769: CPT

## 2023-08-03 PROCEDURE — C1729: CPT

## 2023-08-03 PROCEDURE — 85025 COMPLETE CBC W/AUTO DIFF WBC: CPT

## 2023-08-03 PROCEDURE — 50432 PLMT NEPHROSTOMY CATHETER: CPT

## 2023-08-03 PROCEDURE — T1013: CPT

## 2023-08-03 PROCEDURE — 83690 ASSAY OF LIPASE: CPT

## 2023-08-03 PROCEDURE — 93005 ELECTROCARDIOGRAM TRACING: CPT

## 2023-08-03 PROCEDURE — 83605 ASSAY OF LACTIC ACID: CPT

## 2023-08-03 PROCEDURE — 99222 1ST HOSP IP/OBS MODERATE 55: CPT

## 2023-08-03 PROCEDURE — 82378 CARCINOEMBRYONIC ANTIGEN: CPT

## 2023-08-03 PROCEDURE — 36415 COLL VENOUS BLD VENIPUNCTURE: CPT

## 2023-08-03 PROCEDURE — 76705 ECHO EXAM OF ABDOMEN: CPT | Mod: 26

## 2023-08-03 PROCEDURE — G0378: CPT

## 2023-08-03 PROCEDURE — 80053 COMPREHEN METABOLIC PANEL: CPT

## 2023-08-03 PROCEDURE — 80048 BASIC METABOLIC PNL TOTAL CA: CPT

## 2023-08-03 PROCEDURE — 81001 URINALYSIS AUTO W/SCOPE: CPT

## 2023-08-03 PROCEDURE — 76942 ECHO GUIDE FOR BIOPSY: CPT

## 2023-08-03 PROCEDURE — 85610 PROTHROMBIN TIME: CPT

## 2023-08-03 PROCEDURE — 99285 EMERGENCY DEPT VISIT HI MDM: CPT | Mod: 25

## 2023-08-03 PROCEDURE — 96375 TX/PRO/DX INJ NEW DRUG ADDON: CPT

## 2023-08-03 RX ORDER — POLYETHYLENE GLYCOL 3350 17 G/17G
17 POWDER, FOR SOLUTION ORAL
Qty: 510 | Refills: 0
Start: 2023-08-03 | End: 2023-09-01

## 2023-08-03 RX ORDER — LACTULOSE 10 G/15ML
30 SOLUTION ORAL
Qty: 3600 | Refills: 0
Start: 2023-08-03 | End: 2023-09-01

## 2023-08-03 RX ORDER — SENNA PLUS 8.6 MG/1
2 TABLET ORAL
Qty: 60 | Refills: 0
Start: 2023-08-03 | End: 2023-09-01

## 2023-08-03 RX ADMIN — Medication 650 MILLIGRAM(S): at 05:37

## 2023-08-03 RX ADMIN — POLYETHYLENE GLYCOL 3350 17 GRAM(S): 17 POWDER, FOR SOLUTION ORAL at 11:30

## 2023-08-03 RX ADMIN — AMLODIPINE BESYLATE 5 MILLIGRAM(S): 2.5 TABLET ORAL at 05:07

## 2023-08-03 RX ADMIN — Medication 650 MILLIGRAM(S): at 05:07

## 2023-08-03 NOTE — PROGRESS NOTE ADULT - SUBJECTIVE AND OBJECTIVE BOX
IR Post Procedure Note    Diagnosis: Left Ureteral Obstruction    Procedure: Left PCN Placement    : Jonnathan Banerjee MD    Contrast: 5 cc    Anesthesia: Sedation administered by Anesthesiology    Estimated Blood Loss: Less than 10cc    Specimens: Identified, Labeled, Confirmed and Sent to Lab.    Complications: No Immediate Complications    Anticoagulation: Resume in 24 Hours    Findings & Plan: Left 10Fr PCN placed under US and fluoro guidance, secured w sutures and connected to gravity bag drainage. Cont gravity drainage.      Please call Interventional Radiology with any questions, concerns, or issues. 
IR Post Procedure Note    Diagnosis: Left Ureteral Obstruction    Procedure: Left PCN Placement    : Jonnathan Banerjee MD    Contrast: 5 cc    Anesthesia: 1% lidocaine subcutaneous, Sedation administered by Anesthesiology    Estimated Blood Loss: Less than 10cc    Specimens: Identified, Labeled, Confirmed and Sent to Lab.    Complications: No Immediate Complications    Anticoagulation: Resume in 24 Hours    Findings & Plan: Left 10Fr PCN placed under US and fluoro guidance, secured w sutures and connected to gravity bag drainage. Cont gravity drainage.      Please call Interventional Radiology with any questions, concerns, or issues. 
Subjective: Patient seen at bedside, no current complaints, no overnight events, denies n/v/cp/sob. Left nephrostomy tube in place, draining well      MEDICATIONS  (STANDING):  amLODIPine   Tablet 5 milliGRAM(s) Oral daily  polyethylene glycol 3350 17 Gram(s) Oral daily  senna 2 Tablet(s) Oral at bedtime    MEDICATIONS  (PRN):  acetaminophen     Tablet .. 650 milliGRAM(s) Oral every 6 hours PRN Temp greater or equal to 38C (100.4F), Mild Pain (1 - 3)  oxycodone    5 mG/acetaminophen 325 mG 1 Tablet(s) Oral every 6 hours PRN Mild Pain (1 - 3)      Vital Signs Last 24 Hrs  T(C): 36.8 (03 Aug 2023 07:37), Max: 37 (03 Aug 2023 05:18)  T(F): 98.2 (03 Aug 2023 07:37), Max: 98.6 (03 Aug 2023 05:18)  HR: 73 (03 Aug 2023 07:37) (64 - 73)  BP: 114/76 (03 Aug 2023 07:37) (108/71 - 123/80)  BP(mean): 89 (03 Aug 2023 07:37) (89 - 89)  RR: 18 (03 Aug 2023 07:37) (18 - 18)  SpO2: 96% (03 Aug 2023 07:37) (96% - 100%)    Parameters below as of 03 Aug 2023 07:37  Patient On (Oxygen Delivery Method): room air        Physical Exam:    Constitutional: NAD  HEENT: PERRL, EOMI  Respiratory: Respirations non-labored, no accessory muscle use  Neurological: A&O x 3  : left NT in place, dressing with some drainage      LABS:                        12.1   8.54  )-----------( 260      ( 03 Aug 2023 07:10 )             36.3     08-03    132<L>  |  96  |  21.2<H>  ----------------------------<  196<H>  4.9   |  25.0  |  1.40<H>    Ca    8.2<L>      03 Aug 2023 07:10    TPro  5.6<L>  /  Alb  2.8<L>  /  TBili  0.4  /  DBili  x   /  AST  54<H>  /  ALT  60<H>  /  AlkPhos  129<H>  08-02    PT/INR - ( 02 Aug 2023 05:43 )   PT: 10.4 sec;   INR: 0.94 ratio         PTT - ( 02 Aug 2023 05:43 )  PTT:26.0 sec  Urinalysis Basic - ( 03 Aug 2023 07:10 )    Color: x / Appearance: x / SG: x / pH: x  Gluc: 196 mg/dL / Ketone: x  / Bili: x / Urobili: x   Blood: x / Protein: x / Nitrite: x   Leuk Esterase: x / RBC: x / WBC x   Sq Epi: x / Non Sq Epi: x / Bacteria: x        A: 67M with a history of hypertension who was found to have ascites in [facility] presented with persistent left flank pain found to have hydronephrosis due to a ureteral lesion as well as omental densities suspicious for underlying malignancy, patient now s/p left NT placement and awaiting further workup/tx by primary team and GI    P:  MOnitor NT output  Cr downtrending, continue to monitor  If pt remains stable and able to be d/c'd, pt need to f/u with Dr. Cliff Gregory in 1 week    
Subjective:67yMale with left distal ureteral mass, left hydronephrosis, poss. carcinomatosis. Scheduled for IR placement of L nephrostomy tube today.        Vital Signs Last 24 Hrs  T(C): 36.6 (02 Aug 2023 03:12), Max: 36.8 (01 Aug 2023 19:55)  T(F): 97.9 (02 Aug 2023 03:12), Max: 98.3 (01 Aug 2023 19:55)  HR: 94 (02 Aug 2023 05:05) (82 - 95)  BP: 101/69 (02 Aug 2023 05:05) (101/69 - 148/85)  BP(mean): --  RR: 17 (02 Aug 2023 03:12) (16 - 18)  SpO2: 96% (02 Aug 2023 03:12) (96% - 98%)    Parameters below as of 02 Aug 2023 03:12  Patient On (Oxygen Delivery Method): room air      I&O's Detail      Labs:                        13.0   9.86  )-----------( 300      ( 01 Aug 2023 04:45 )             38.4     08-02    131<L>  |  95<L>  |  16.0  ----------------------------<  186<H>  5.2   |  26.0  |  1.75<H>    Ca    8.3<L>      02 Aug 2023 05:43    TPro  5.6<L>  /  Alb  2.8<L>  /  TBili  0.4  /  DBili  x   /  AST  54<H>  /  ALT  60<H>  /  AlkPhos  129<H>  08-02    PT/INR - ( 02 Aug 2023 05:43 )   PT: 10.4 sec;   INR: 0.94 ratio         PTT - ( 02 Aug 2023 05:43 )  PTT:26.0 sec

## 2023-08-03 NOTE — CONSULT NOTE ADULT - SUBJECTIVE AND OBJECTIVE BOX
HPI:  The patient is 67-year-old male with history of hypertension presented to [facility] ED complaining of left lower back pain. In the ED, CT abdomen and pelvis noted for enhancing lesion in the left distal ureter with associated moderate to severe left hydroureteronephrosis. Patient seen by urology requesting IR nephrostomy placement.      ============================================================================    Allergies:   No Known Allergies    ============================================================================  PAST MEDICAL & SURGICAL HISTORY:      FAMILY HISTORY:      Social History:      ============================================================================  Vitals:  Vital Signs Last 24 Hrs  T(C): 36.7 (01 Aug 2023 14:15), Max: 36.8 (01 Aug 2023 07:30)  T(F): 98 (01 Aug 2023 14:15), Max: 98.3 (01 Aug 2023 07:30)  HR: 82 (01 Aug 2023 14:15) (56 - 82)  BP: 146/97 (01 Aug 2023 14:15) (127/80 - 146/97)  BP(mean): --  RR: 18 (01 Aug 2023 14:15) (18 - 18)  SpO2: 98% (01 Aug 2023 14:15) (97% - 99%)    Parameters below as of 01 Aug 2023 07:30  Patient On (Oxygen Delivery Method): room air    Labs:                        13.0   9.86  )-----------( 300      ( 01 Aug 2023 04:45 )             38.4     08-01    130<L>  |  91<L>  |  15.6  ----------------------------<  338<H>  4.3   |  25.0  |  1.28    Ca    8.5      01 Aug 2023 04:45    TPro  6.6  /  Alb  3.2<L>  /  TBili  0.5  /  DBili  x   /  AST  33  /  ALT  31  /  AlkPhos  105  08-01        Imaging:   Pertinent Imaging Reviewed.    ============================================================================
Patient is a 67y old  Male who presents with a chief complaint of  left sided flank pain    HPI:  67M who presented with left sided back pain. He had been on vacation in [facility] when the symptoms began. He was evaluated at the hospital and underwent paracentesis and imaging and was informed of concern for possible colon cancer. He return to [place] and presented to the hospital with persistent pain. Upon initial evaluation, he was noted to have left sided hydronephrosis and was placed under observational status in th emergency department with plans for a nephrostomy tube. The patient denied any change in urination, hematuria, or dysuria. There were no recent fevers, chills, or sick contacts. He did attest to a 10 pound weight loss over the past two weeks. He denied any change in his bowel habits. He reported a colonoscopy four years ago and was scheduled for a repeat colonoscopy in ten years. He denied any history of ascites in the past and denied any episodes of jaundice. He also denies any h/o liver disease and does not abuse alcohol. No prior h/o hepatitis. Pt. had left-sided PCN tube placed yesterday.        REVIEW OF SYSTEMS:  Constitutional: No fever, weight loss or fatigue  ENMT:  No difficulty hearing, tinnitus, vertigo; No sinus or throat pain  Respiratory: No cough, wheezing, chills or hemoptysis  Cardiovascular: No chest pain, palpitations, dizziness or leg swelling  Gastrointestinal: As per HPI.  Skin: No itching, burning, rashes or lesions   Musculoskeletal: No joint pain or swelling; No muscle, back or extremity pain  Patient has no cardiopulmonary, peripheral vascular, musculoskeletal, dermatological, neurological, gynecological or psychological symptoms or complaints at this time.    PAST MEDICAL & SURGICAL HISTORY:      FAMILY HISTORY:      SOCIAL HISTORY:  Smoking Status: [ ] Current, [ ] Former, [x ] Never  Pack Years: N/A. No ETOH or drug abuse history.    MEDICATIONS:  MEDICATIONS  (STANDING):  amLODIPine   Tablet 5 milliGRAM(s) Oral daily  polyethylene glycol 3350 17 Gram(s) Oral daily  senna 2 Tablet(s) Oral at bedtime    MEDICATIONS  (PRN):  acetaminophen     Tablet .. 650 milliGRAM(s) Oral every 6 hours PRN Temp greater or equal to 38C (100.4F), Mild Pain (1 - 3)  oxycodone    5 mG/acetaminophen 325 mG 1 Tablet(s) Oral every 6 hours PRN Mild Pain (1 - 3)      Allergies    No Known Allergies    Intolerances        Vital Signs Last 24 Hrs  T(C): 36.8 (03 Aug 2023 07:37), Max: 37 (03 Aug 2023 05:18)  T(F): 98.2 (03 Aug 2023 07:37), Max: 98.6 (03 Aug 2023 05:18)  HR: 73 (03 Aug 2023 07:37) (64 - 73)  BP: 114/76 (03 Aug 2023 07:37) (108/71 - 123/80)  BP(mean): 89 (03 Aug 2023 07:37) (89 - 89)  RR: 18 (03 Aug 2023 07:37) (18 - 18)  SpO2: 96% (03 Aug 2023 07:37) (96% - 100%)    Parameters below as of 03 Aug 2023 07:37  Patient On (Oxygen Delivery Method): room air        08-02 @ 07:01  -  08-03 @ 07:00  --------------------------------------------------------  IN: 0 mL / OUT: 940 mL / NET: -940 mL      Physical Exam: Vital Signs Last 24 Hrs  T(C): 36.7 (02 Aug 2023 08:29), Max: 36.8 (01 Aug 2023 19:55)  T(F): 98 (02 Aug 2023 08:29), Max: 98.3 (01 Aug 2023 19:55)  HR: 66 (02 Aug 2023 08:29) (66 - 95)  BP: 116/74 (02 Aug 2023 08:29) (101/69 - 148/85)  BP(mean): --  RR: 18 (02 Aug 2023 08:29) (16 - 18)  SpO2: 98% (02 Aug 2023 08:29) (96% - 98%)    Parameters below as of 02 Aug 2023 08:29  Patient On (Oxygen Delivery Method): room air    General appearance: No acute distress, Awake, Alert  HEENT: Normocephalic, Atraumatic, Conjunctiva clear, EOMI  Neck: Supple, No JVD, No tenderness  Lungs: Breath sound equal bilaterally, No wheezes, No rales  Cardiovascular: S1S2, Regular rhythm  Abdomen: Soft, Nontender, Nondistended, No guarding/rebound, Positive bowel sounds, Left costovertebral angle tenderness  Extremities: No clubbing, No cyanosis, No edema, No calf tenderness  Neuro: Strength equal bilaterally, No tremors  Psychiatric: Appropriate mood, Normal affect      Laboratory:   Blood Gas:    01-Aug-2023 04:45, Blood Gas Venous - Lactate  Blood Gas Venous - Lactate:   3.00, [0.50 - 2.00 mmol/L], Elevated lactate. Consider ordering follow-up lactate to trend.  	TYPE:(C=Critical, N=Notification, A=Abnormal) N  	TESTS: _ LACTATEWBV  	DATE/TIME CALLED: _08/01/2023 05:35:32 EDT  	CALLED TO: _ DR Shree AKERS  	READ BACK (2 Patient Identifiers)(Y/N): _Y  	READ BACK VALUES (Y/N): _Y  	CALLED BY: _DF    01-Aug-2023 06:19, Blood Gas Venous - Lactate  Blood Gas Venous - Lactate: 1.70, [0.50 - 2.00 mmol/L]  General Chemistry:    01-Aug-2023 04:45, Comprehensive Metabolic Panel  Sodium:   130, [135 - 145 mmol/L]  Potassium: 4.3, [3.5 - 5.3 mmol/L]  Chloride:   91, [96 - 108 mmol/L], Chloride reference range changed from ..10/26/2022  Carbon Dioxide: 25.0, [22.0 - 29.0 mmol/L]  Anion Gap: 14, [5 - 17 mmol/L]  Blood Urea Nitrogen: 15.6, [8.0 - 20.0 mg/dL]  Creatinine: 1.28, [0.50 - 1.30 mg/dL]  Glucose:   338, [70 - 99 mg/dL]  Calcium: 8.5, [8.4 - 10.5 mg/dL]  Protein Total: 6.6, [6.6 - 8.7 g/dL]  Albumin:   3.2, [3.3 - 5.2 g/dL]  Bilirubin Total: 0.5, [0.4 - 2.0 mg/dL]  Alkaline Phosphatase: 105, [40 - 120 U/L]  Aspartate Aminotransferase (AST/SGOT): 33, [ - <=39 U/L]  Alanine Aminotransferase (ALT/SGPT): 31, [ - <=40 U/L]  eGFR: 62, [>=60 mL/min/1.73m2], The estimated glomerular filtration rate (eGFR) is calculated using the  	2021 CKD-EPI creatinine equation, which does not have a coefficient for  	race and is validated in individuals 18 years of age and older (N Engl J  	Med 2021; 385:4773-7589). Creatinine-based eGFR may be inaccurate in  	various situations including but not limited to extremes of muscle mass,  	altered dietary protein intake, or medications that affect renal tubular  	creatinine secretion.    01-Aug-2023 04:45, Lipase  Lipase: 45, [22 - 51 U/L]    02-Aug-2023 05:43, Comprehensive Metabolic Panel  Sodium:   131, [135 - 145 mmol/L]  Potassium: 5.2, [3.5 - 5.3 mmol/L]  Chloride:   95, [96 - 108 mmol/L], Chloride reference range changed from ..10/26/2022  Carbon Dioxide: 26.0, [22.0 - 29.0 mmol/L]  Anion Gap: 10, [5 - 17 mmol/L]  Blood Urea Nitrogen: 16.0, [8.0 - 20.0 mg/dL]  Creatinine:   1.75, [0.50 - 1.30 mg/dL]  Glucose:   186, [70 - 99 mg/dL]  Calcium:   8.3, [8.4 - 10.5 mg/dL]  Protein Total:   5.6, [6.6 - 8.7 g/dL]  Albumin:   2.8, [3.3 - 5.2 g/dL]  Bilirubin Total: 0.4, [0.4 - 2.0 mg/dL]  Alkaline Phosphatase:   129, [40 - 120 U/L]  Aspartate Aminotransferase (AST/SGOT):   54, [ - <=39 U/L], Hemolyzed. Interpret with caution  Alanine Aminotransferase (ALT/SGPT):   60, [ - <=40 U/L]  eGFR:   43, [>=60 mL/min/1.73m2], The estimated glomerular filtration rate (eGFR) is calculated using the  	2021 CKD-EPI creatinine equation, which does not have a coefficient for  	race and is validated in individuals 18 years of age and older (N Engl J  	Med 2021; 385:6500-6075). Creatinine-based eGFR may be inaccurate in  	various situations including but not limited to extremes of muscle mass,  	altered dietary protein intake, or medications that affect renal tubular  	creatinine secretion.  Coagulation:    02-Aug-2023 05:43, Activated Partial Thromboplastin Time  Activated Partial Thromboplastin Time: 26.0, [24.5 - 35.6 sec], Effective July 25, 2023, the reference range has changed.  	The recommended therapeutic heparin range (full dose) is 58-99 seconds.  	Argatroban range is 1.5 to 3.0 times of the baseline APTT value, not to  	exceed 100 seconds.  	Routine coagulation results should be interpreted with caution when  	taking Factor Xa inhibitors or direct thrombin inhibitors; blood sampling  	prior to drug intake is recommended.    02-Aug-2023 05:43, Prothrombin Time and INR, Plasma  Prothrombin Time, Plasma: 10.4, [9.5 - 13.0 sec], Effective July 25, 2023, the reference range has changed.  INR: 0.94, [0.85 - 1.18 ratio], Recommended targets/ranges for therapeutic INR:  	2.0-3.0 Deep vein thrombosis, pulmonary embolism, atrial fibrillation  	2.0-3.0 Mechanical aortic valve, antiphospholipid syndrome with previous  	arterial or venous thromboembolism  	2.5-3.5 Mechanical mitral valve, double mechanical valve (aortic and  	mitral positions, high risk valves)  	Note: Chest 2012 Feb;141(2 Suppl):7S-47S  	Routine coagulation results should be interpreted with caution when  	taking Factor Xa inhibitors or direct thrombin inhibitors; blood sampling  	prior to drug intake is recommended.  Hematology:    01-Aug-2023 04:45, Complete Blood Count + Automated Diff  WBC Count: 9.86, [3.80 - 10.50 K/uL]  RBC Count: 4.44, [4.20 - 5.80 M/uL]  Hemoglobin: 13.0, [13.0 - 17.0 g/dL]  Hematocrit:   38.4, [39.0 - 50.0 %]  Mean Cell Volume: 86.5, [80.0 - 100.0 fl]  Mean Cell Hemoglobin: 29.3, [27.0 - 34.0 pg]  Mean Cell Hemoglobin Conc: 33.9, [32.0 - 36.0 gm/dL]  Red Cell Distrib Width: 12.9, [10.3 - 14.5 %]  Platelet Count - Automated: 300, [150 - 400 K/uL]  Auto Neutrophil #:   7.53, [1.80 - 7.40 K/uL]  Auto Lymphocyte #: 1.10, [1.00 - 3.30 K/uL]  Auto Monocyte #:   1.01, [0.00 - 0.90 K/uL]  Auto Eosinophil #: 0.10, [0.00 - 0.50 K/uL]  Auto Basophil #: 0.03, [0.00 - 0.20 K/uL]  Auto Neutrophil %: 76.4, [43.0 - 77.0 %], Differential percentages must be correlated with absolute numbers for  	clinical significance.  Auto Lymphocyte %:   11.2, [13.0 - 44.0 %]  Auto Monocyte %: 10.2, [2.0 - 14.0 %]  Auto Eosinophil %: 1.0, [0.0 - 6.0 %]  Auto Basophil %: 0.3, [0.0 - 2.0 %]  Auto Immature Granulocyte %: 0.9, [0.0 - 0.9 %], (Includes meta, myelo and promyelocytes). Mild elevations in immature  	granulocytes may be seen with many inflammatory processes and pregnancy;  	clinical correlation suggested.  Urine:    01-Aug-2023 04:45, Urinalysis + Microscopic Examination  pH Urine: 5.0, [5.0 - 8.0]  Urine Appearance:   Slightly Turbid, [Clear]  Color: Yellow, [Yellow]  Specific Gravity: 1.015, [1.010 - 1.025]  Protein, Urine:   30, [Negative mg/dL]  Ketone - Urine: Negative, [Negative]  Blood, Urine:   Trace, [Negative]  Bilirubin: Negative, [Negative]  Urobilinogen: Negative, [Negative mg/dL]  Leukocyte Esterase Concentration: Negative, [Negative]  Nitrite: Negative, [Negative]  White Blood Cell - Urine: 0-2, [0 - 5 /HPF]  Red Blood Cell - Urine: 0-2, [0 - 4 /HPF]  Bacteria:   Occasional, [Negative]  Squamous Epithelial Cells: Occasional, [Neg. - Few]  Glucose Qualitative, Urine:   250, [Negative mg/dL]    Assessment and Plan:   · VTE Risk Assessment	VTE Assessment already completed for this visit   · Completed VTE Risk Assessment(s)	Refer to the Assessment tab to view/cancel completed assessment.      Assessment:  · Assessment	  67M with a history of hypertension who was found to have ascites in [facility] presented with persistent left flank pain found to have hydronephrosis due to a ureteral lesion as well as omental densities suspicious for underlying malignancy.    Hydronephrosis - Urology consultation noted. Planned for nephrostomy tube insertion today by Interventional Radiology.    Acute kidney injury / Hyponatremia - Lisinopril and hydrochlorothiazide were initiated in the emergency department. To hold the medications for now. The patient's son reported that he was only on Benicar/Amlodipine at home. Continue to monitor renal function after nephrostomy tube placement.    Hypertension - Close blood pressure monitoring. Continue on amlodipine.    Transaminitis - Mild. Hepatic steatosis noted on CT of the abdomen,    Omental densities - Suspicious for underlying malignancy. The patient had a colonoscopy four years prior which was reported to be negative and advised for follow up study in ten years. Tumor markers requested. Discussed with Interventional Radiology. Small amount of ascites noted, unclear if able to get a tissue sample from the omentum.    Discussed with the patient and his son at the bedside.          Electronic Signatures:  Nehemias Elena)  (Signed 02-Aug-2023 10:53)  	Authored: History and Physical, History of Present Illness, Allergies/Medications, Patient History, Risk Assessment, Physical Exam, Labs and Results, Assessment and Plan      Last Updated: 02-Aug-2023 10:53 by Nehemias Elena)      LABS:                        12.1   8.54  )-----------( 260      ( 03 Aug 2023 07:10 )             36.3     08-03    132<L>  |  96  |  21.2<H>  ----------------------------<  196<H>  4.9   |  25.0  |  1.40<H>    Ca    8.2<L>      03 Aug 2023 07:10    TPro  5.6<L>  /  Alb  2.8<L>  /  TBili  0.4  /  DBili  x   /  AST  54<H>  /  ALT  60<H>  /  AlkPhos  129<H>  08-02          RADIOLOGY & ADDITIONAL STUDIES:   < from: CT Abdomen and Pelvis w/ IV Cont (08.01.23 @ 08:29) >  CC: [id number] EXAM:  CT ABDOMEN AND PELVIS IC   ORDERED BY: SHREE TRAORE     PROCEDURE DATE:  08/01/2023          INTERPRETATION:  CLINICAL INFORMATION: Left abdominal pain    COMPARISON: None.    CONTRAST/COMPLICATIONS:  IV Contrast: Omnipaque 350  90 cc administered   10 cc discarded  Oral Contrast: NONE  Complications: None reported at time of study completion    PROCEDURE:  CT of the Abdomen and Pelvis was performed.  Sagittal and coronal reformats were performed.    FINDINGS:  LOWER CHEST: Small bilateral atelectasis. Small calcified granuloma in   the right lower lobe.    LIVER: Hepatic steatosis  BILE DUCTS: Normal caliber.  GALLBLADDER: Cholecystectomy clips are present  SPLEEN: Within normal limits.  PANCREAS: Within normallimits.  ADRENALS: Within normal limits.  KIDNEYS/URETERS: Apparent 0.7 x 1.1 x 1.8 cm enhancing lesion in the left   distal ureter (image 125 series 3) with associated moderate to severe   left hydroureteronephrosis. Small right renal cyst.    BLADDER: Within normal limits.  REPRODUCTIVE ORGANS: The prostate and seminal vesicles appear grossly   unremarkable.    BOWEL: No bowel obstruction. No evidence for acute appendicitis. Apparent   mural thickening versus underdistention of the stomach. If clinically   indicated, EGD may be pursued for further evaluation.  PERITONEUM: Mild ascites in the abdomen and pelvis. No free air. Omental   densities bilaterally may represent omental carcinomatosis.  VESSELS: Calcified atherosclerotic disease.  RETROPERITONEUM/LYMPH NODES: No lymphadenopathy.  ABDOMINAL WALL: Small fat-containing umbilical hernia.  BONES: Degenerative spondylosis.    IMPRESSION:  Apparent 0.7 x 1.1 x 1.8 cm enhancing mass lesion in the left distal   ureter with associated moderate to severe left hydroureteronephrosis.    Apparent mural thickening versus underdistention of the stomach. If   clinically indicated, EGD may be pursued for further evaluation.    Mild ascites in the abdomen and pelvis.    Omental densities bilaterally may represent omental carcinomatosis.    --- End of Report ---            JOHNNY SALDANA MD; Attending Radiologist  This document has been electronically signed. Aug  1 2023  9:26AM    < end of copied text >  
Pt speaks Haitian, son at bedside translating, preferred by pt.  HPI: 68 yo male presents to the ED with 2 weeks of left flank pain.  Pt was in [facility] on vacation when the pain started.  Pt went to an urgent care and was sent to the hospital.  He was told there was a mass ? colon. His creatinine was 2.2, now 1.28 today.  Pt's son advised he come back to [place] for further work up and treatment.  Upon further interviewing of the patient, he stated that when in the hospital in [facility], he had a tube placed in his abdomen that was draining a lot of fluid. Pt describing 3L of fluid, was removed the following day.  He also had a hard time moving his bowels.  Pt has nausea when the pain is severe.  Pt denies any hematuria, voids w/o difficulty.  He was a smoker a long time ago.  No fever or chills. CT scan today shows mod to sev hydronephrosis on the left and a distal ureteral mass causing the hydro. Possible omental carcinomatosis.    PAST MEDICAL & SURGICAL HISTORY:  HTN    PSHx:  lap miracle  knee surgery      No Known Allergies      T(C): 36.8 (23 @ 07:30), Max: 36.8 (23 @ 07:30)  HR: 79 (23 @ 07:30) (56 - 79)  BP: 134/87 (23 @ 07:30) (127/80 - 134/87)  RR: 18 (23 @ 07:30) (18 - 18)  SpO2: 97% (23 @ 07:30) (97% - 99%)                              13.0   9.86  )-----------( 300      ( 01 Aug 2023 04:45 )             38.4           130<L>  |  91<L>  |  15.6  ----------------------------<  338<H>  4.3   |  25.0  |  1.28    Ca    8.5      01 Aug 2023 04:45    TPro  6.6  /  Alb  3.2<L>  /  TBili  0.5  /  DBili  x   /  AST  33  /  ALT  31  /  AlkPhos  105        Urinalysis Basic - ( 01 Aug 2023 04:45 )    Color: Yellow / Appearance: Slightly Turbid / S.015 / pH: x  Gluc: 338 mg/dL / Ketone: Negative  / Bili: Negative / Urobili: Negative mg/dL   Blood: x / Protein: 30 mg/dL / Nitrite: Negative   Leuk Esterase: Negative / RBC: 0-2 /HPF / WBC 0-2 /HPF   Sq Epi: x / Non Sq Epi: x / Bacteria: Occasional    Radiology: < from: CT Abdomen and Pelvis w/ IV Cont (23 @ 08:29) >  ACC: [id number] EXAM:  CT ABDOMEN AND PELVIS IC   ORDERED BY: STAN TRAORE     PROCEDURE DATE:  2023          INTERPRETATION:  CLINICAL INFORMATION: Left abdominal pain    COMPARISON: None.    CONTRAST/COMPLICATIONS:  IV Contrast: Omnipaque 350  90 cc administered   10 cc discarded  Oral Contrast: NONE  Complications: None reported at time of study completion    PROCEDURE:  CT of the Abdomen and Pelvis was performed.  Sagittal and coronal reformats were performed.    FINDINGS:  LOWER CHEST: Small bilateral atelectasis. Small calcified granuloma in   the right lower lobe.    LIVER: Hepatic steatosis  BILE DUCTS: Normal caliber.  GALLBLADDER: Cholecystectomy clips are present  SPLEEN: Within normal limits.  PANCREAS: Within normallimits.  ADRENALS: Within normal limits.  KIDNEYS/URETERS: Apparent 0.7 x 1.1 x 1.8 cm enhancing lesion in the left   distal ureter (image 125 series 3) with associated moderate to severe   left hydroureteronephrosis. Small right renal cyst.    BLADDER: Within normal limits.  REPRODUCTIVE ORGANS: The prostate and seminal vesicles appear grossly   unremarkable.    BOWEL: No bowel obstruction. No evidence for acute appendicitis. Apparent   mural thickening versus underdistention of the stomach. If clinically   indicated, EGD may be pursued for further evaluation.  PERITONEUM: Mild ascites in the abdomen and pelvis. No free air. Omental   densities bilaterally may represent omental carcinomatosis.  VESSELS: Calcified atherosclerotic disease.  RETROPERITONEUM/LYMPH NODES: No lymphadenopathy.  ABDOMINAL WALL: Small fat-containing umbilical hernia.  BONES: Degenerative spondylosis.    IMPRESSION:  Apparent 0.7 x 1.1 x 1.8 cm enhancing mass lesion in the left distal   ureter with associated moderate to severe left hydroureteronephrosis.    Apparent mural thickening versus underdistention of the stomach. If   clinically indicated, EGD may be pursued for further evaluation.    Mild ascites in the abdomen and pelvis.    Omental densities bilaterally may represent omental carcinomatosis.    < end of copied text >

## 2023-08-03 NOTE — CONSULT NOTE ADULT - CONSULT REASON
Left urethral mass, hydronephrosis
Ascites / Hepatic steatosis
left hydronephrosis, left ureteral mass

## 2023-08-03 NOTE — DISCHARGE NOTE PROVIDER - HOSPITAL COURSE
67M with a history of hypertension who was found to have ascites in [facility] presented with persistent left flank pain found to have hydronephrosis due to a ureteral lesion as well as omental densities suspicious for underlying malignancy. Pt admitted to [facility] for hydronephrosis. Urology and GI were consulted. Pt underwent nephrostomy tube placement on     Hydronephrosis - Urology consultation noted. Planned for nephrostomy tube insertion today by Interventional Radiology.    Acute kidney injury / Hyponatremia - Lisinopril and hydrochlorothiazide were initiated in the emergency department. To hold the medications for now. The patient's son reported that he was only on Benicar/Amlodipine at home. Continue to monitor renal function after nephrostomy tube placement.    Hypertension - Close blood pressure monitoring. Continue on amlodipine.    Transaminitis - Mild. Hepatic steatosis noted on CT of the abdomen,    Omental densities - Suspicious for underlying malignancy. The patient had a colonoscopy four years prior which was reported to be negative and advised for follow up study in ten years. Tumor markers requested. Discussed with Interventional Radiology. Small amount of ascites noted, unclear if able to get a tissue sample from the omentum. Will have to reassess after the nephrostomy tube insertion.    Discussed with the patient and his son at the bedside. 67M with a history of hypertension who was found to have ascites in [facility] presented with persistent left flank pain found to have hydronephrosis due to a ureteral lesion as well as omental densities suspicious for underlying malignancy. Pt admitted to [facility] for hydronephrosis. Urology and GI were consulted. Pt underwent nephrostomy tube placement on 8/2 and tolerated procedure well without any complications.  GI was consulted for omental densities founded on imaging.  given possibility for malignancy, IR was consulted, and recommended IR biopsy and/or paracentesis as Outpatient.  Pt as this time stable from a medically standpoint and is stable for discharge. All electrolyte abnormalities were monitored carefully and repleted as necessary during this hospitalization. At the time of discharge patient was hemodynamically stable and amenable to all terms of discharge. The patient has received verbal instructions from myself regarding discharge plans.    67M with a history of hypertension who was found to have ascites in [facility] presented with persistent left flank pain found to have hydronephrosis due to a ureteral lesion as well as omental densities suspicious for underlying malignancy. Pt admitted to [facility] for hydronephrosis. Urology and GI were consulted. Pt underwent nephrostomy tube placement on 8/2 and tolerated procedure well without any complications.  GI was consulted for omental densities founded on imaging.  given possibility for malignancy, IR was consulted, and recommended IR biopsy and/or paracentesis as Outpatient.  Pt as this time stable from a medically standpoint and is stable for discharge. All electrolyte abnormalities were monitored carefully and repleted as necessary during this hospitalization. At the time of discharge patient was hemodynamically stable and amenable to all terms of discharge. The patient has received verbal instructions from myself regarding discharge plans.     All electrolyte abnormalities were monitored carefully and repleted as necessary during this hospitalization. At the time of discharge patient was hemodynamically stable and amenable to all terms of discharge. 67M with a history of hypertension who was found to have ascites in [facility] presented with persistent left flank pain found to have hydronephrosis due to a ureteral lesion as well as omental densities suspicious for underlying malignancy. Pt admitted to [facility] for hydronephrosis. Urology and GI were consulted. Pt underwent nephrostomy tube placement on 8/2 and tolerated procedure well without any complications.  GI was consulted for omental densities founded on imaging.  given possibility for malignancy, IR was consulted, and recommended IR biopsy and/or paracentesis as Outpatient.  Pt as this time stable from a medically standpoint and is stable for discharge. All electrolyte abnormalities were monitored carefully and repleted as necessary during this hospitalization. At the time of discharge patient was hemodynamically stable and amenable to all terms of discharge. The patient has received verbal instructions from myself regarding discharge plans.  Optimized for DC home.

## 2023-08-03 NOTE — DISCHARGE NOTE PROVIDER - NSDCCPCAREPLAN_GEN_ALL_CORE_FT
PRINCIPAL DISCHARGE DIAGNOSIS  Diagnosis: Ureteral mass  Assessment and Plan of Treatment:       SECONDARY DISCHARGE DIAGNOSES  Diagnosis: Hydronephrosis  Assessment and Plan of Treatment:      PRINCIPAL DISCHARGE DIAGNOSIS  Diagnosis: Hydronephrosis  Assessment and Plan of Treatment: A nephrostomy tube was placed during your hospitalization. [place] keep area clean and dry.  Please follow up with Urology Dr. Gregory in 1 week.      SECONDARY DISCHARGE DIAGNOSES  Diagnosis: DESI (acute kidney injury)  Assessment and Plan of Treatment: Continue     PRINCIPAL DISCHARGE DIAGNOSIS  Diagnosis: Hydronephrosis  Assessment and Plan of Treatment: - A nephrostomy tube was placed during your hospitalization  - Please keep area clean and dry  - Please follow up with Urology Dr. Gregory in 1 week      SECONDARY DISCHARGE DIAGNOSES  Diagnosis: DESI (acute kidney injury)  Assessment and Plan of Treatment: - Improving  - Follow up with PCP in 1 week    Diagnosis: HTN (hypertension)  Assessment and Plan of Treatment: - Continue with medications as directed  - Follow up with PCP in 1 week    Diagnosis: Transaminitis  Assessment and Plan of Treatment: - Continue with medications as directed  - Follow up with PCP in 1 week     PRINCIPAL DISCHARGE DIAGNOSIS  Diagnosis: Hydronephrosis  Assessment and Plan of Treatment: - A nephrostomy tube was placed during your hospitalization  - Please keep area clean and dry  - Please follow up with Urology Dr. Gregory in 1 week  - There was a ureter mass noted as well.      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  - Please keep area and dressing clean and dry  - Please follow up with Urology Dr. Gregory in 1 week  - There was a ureter mass noted as well.  Follow up with Urology to further discuss.      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

## 2023-08-03 NOTE — DISCHARGE NOTE PROVIDER - NSDCMRMEDTOKEN_GEN_ALL_CORE_FT
amLODIPine 5 mg oral tablet: 1 orally once a day  Benicar 40 mg oral tablet: 1 orally once a day   amLODIPine 5 mg oral tablet: 1 orally once a day  Benicar 40 mg oral tablet: 1 orally once a day  Biopsy: Outpatient Omental Biopsy and/or Paracentesis   amLODIPine 5 mg oral tablet: 1 orally once a day  Benicar 40 mg oral tablet: 1 orally once a day  lactulose 10 g/15 mL oral syrup: 30 milliliter(s) orally every 6 hours as needed for  constipation  polyethylene glycol 3350 oral powder for reconstitution: 17 gram(s) orally once a day  senna leaf extract oral tablet: 2 tab(s) orally once a day (at bedtime)

## 2023-08-03 NOTE — CONSULT NOTE ADULT - PROBLEM SELECTOR RECOMMENDATION 9
Possibly malignant. Will need IR directed paracentesis if enough ascitic fluid present. Would obtain US to evaluate for amount of ascites present. Tumor markers. 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

## 2023-08-03 NOTE — DISCHARGE NOTE NURSING/CASE MANAGEMENT/SOCIAL WORK - PATIENT PORTAL LINK FT
You can access the FollowMyHealth Patient Portal offered by [facility] by registering at the following website: [email or web address]. By joining [facility]’s FollowMyHealth portal, you will also be able to view your health information using other applications (apps) compatible with our system.

## 2023-08-03 NOTE — DISCHARGE NOTE PROVIDER - PROVIDER TOKENS
PROVIDER:[TOKEN:[[id number]],FOLLOWUP:[1 week]],PROVIDER:[TOKEN:[[id number]],FOLLOWUP:[2 weeks]] PROVIDER:[TOKEN:[[id number]],FOLLOWUP:[1 week]],PROVIDER:[TOKEN:[[id number]],FOLLOWUP:[2 weeks]] PROVIDER:[TOKEN:[[id number]],FOLLOWUP:[1 week]],PROVIDER:[TOKEN:[[id number]],FOLLOWUP:[2 weeks]],FREE:[LAST:[IR],PHONE:[phone]],FAX:[(   )    -],FOLLOWUP:[1-3 days]]

## 2023-08-03 NOTE — DISCHARGE NOTE PROVIDER - CARE PROVIDERS DIRECT ADDRESSES
,[email or web address],DirectAddress_Unknown ,[email or web address],DirectAddress_Unknown,DirectAddress_Unknown

## 2023-08-03 NOTE — DISCHARGE NOTE PROVIDER - CARE PROVIDER_API CALL
Cliff Gregory  Urology  [address] [place] [address]  Phone: [phone]  Fax: [phone]  Follow Up Time: 1 week    Ricardo Cortez  Gastroenterology  [address], [address]  [place] [address]  Phone: [phone]  Fax: [phone]  Follow Up Time: 2 weeks   Cliff Gregory  Urology  [address] [place] [address]  Phone: [phone]  Fax: [phone]  Follow Up Time: 1 week    Светлана Briseno  Transplant Hepatology  [address], [address]  [place] [address]  Phone: [phone]  Fax: [phone]  Follow Up Time: 2 weeks   Cliff Gregory  Urology  [address] [place] [address]  Phone: [phone]  Fax: [phone]  Follow Up Time: 1 week    Светлана Briseno  Transplant Hepatology  [address], [address]  [place] [address]  Phone: [phone]  Fax: [phone]  Follow Up Time: 2 weeks    IR,   Phone: [phone]  Fax: (   )    -  Follow Up Time: 1-3 days

## 2023-08-03 NOTE — DISCHARGE NOTE PROVIDER - ATTENDING DISCHARGE PHYSICAL EXAMINATION:
PHYSICAL EXAM:  Vital Signs Last 24 Hrs  T(F): 98.2 (03 Aug 2023 07:37), Max: 98.6 (03 Aug 2023 05:18)  HR: 73 (03 Aug 2023 07:37) (70 - 73)  BP: 114/76 (03 Aug 2023 07:37) (114/76 - 123/80)  RR: 18 (03 Aug 2023 07:37) (18 - 18)  SpO2: 96% (03 Aug 2023 07:37) (96% - 100%)    GENERAL: NAD, Resting in bed  Eyes: EOMI, PERRLA  ENMT: Conjunctiva and sclera clear; supple neck, No JVD  Cardiovascular: S1,S2, RRR, No murmur  Respiratory: CTA B/L, Non-labored breathing  GI: Bowel sounds present; Soft, Nontender, Nondistended. No hepatomegaly, Left sided nephrostomy tube in place  Genitourinary: Deferred  Skin:  no breakdowns, ulcers or discharge, No rashes or lesions  Neurology: Alert & Oriented X3, non-focal and spontaneous movements of all extremities, CN 2 to 12 grossly intact   Psych: Appropriate mood and affect, calm, pleasant, Responds appropriately to questions

## 2023-08-04 LAB
CULTURE RESULTS: NO GROWTH — SIGNIFICANT CHANGE UP
SPECIMEN SOURCE: SIGNIFICANT CHANGE UP

## 2023-08-07 ENCOUNTER — OUTPATIENT (OUTPATIENT)
Dept: OUTPATIENT SERVICES | Facility: HOSPITAL | Age: 68
LOS: 1 days | End: 2023-08-07
Payer: MEDICARE

## 2023-08-07 ENCOUNTER — TRANSCRIPTION ENCOUNTER (OUTPATIENT)
Age: 68
End: 2023-08-07

## 2023-08-07 ENCOUNTER — RESULT REVIEW (OUTPATIENT)
Age: 68
End: 2023-08-07

## 2023-08-07 VITALS
RESPIRATION RATE: 18 BRPM | TEMPERATURE: 98 F | DIASTOLIC BLOOD PRESSURE: 90 MMHG | SYSTOLIC BLOOD PRESSURE: 138 MMHG | WEIGHT: 156.97 LBS | HEART RATE: 100 BPM | HEIGHT: 66 IN | OXYGEN SATURATION: 98 %

## 2023-08-07 DIAGNOSIS — N28.89 OTHER SPECIFIED DISORDERS OF KIDNEY AND URETER: ICD-10-CM

## 2023-08-07 LAB
ALBUMIN FLD-MCNC: 2.3 G/DL — SIGNIFICANT CHANGE UP
B PERT IGG+IGM PNL SER: ABNORMAL
COLOR FLD: ABNORMAL
EOSINOPHIL # FLD: 2 % — SIGNIFICANT CHANGE UP
FLUID INTAKE SUBSTANCE CLASS: SIGNIFICANT CHANGE UP
GLUCOSE FLD-MCNC: 183 MG/DL — SIGNIFICANT CHANGE UP
LDH SERPL L TO P-CCNC: 132 U/L — SIGNIFICANT CHANGE UP
LYMPHOCYTES # FLD: 14 % — SIGNIFICANT CHANGE UP
MESOTHL CELL # FLD: 1 % — SIGNIFICANT CHANGE UP
MONOS+MACROS # FLD: 25 % — SIGNIFICANT CHANGE UP
NEUTROPHILS-BODY FLUID: 58 % — SIGNIFICANT CHANGE UP
PROT FLD-MCNC: 4.4 G/DL — SIGNIFICANT CHANGE UP
RCV VOL RI: 9000 /UL — HIGH (ref 0–0)
TOTAL NUCLEATED CELL COUNT, BODY FLUID: 2713 /UL — SIGNIFICANT CHANGE UP
TUBE TYPE: SIGNIFICANT CHANGE UP

## 2023-08-07 PROCEDURE — 88305 TISSUE EXAM BY PATHOLOGIST: CPT | Mod: 26,59

## 2023-08-07 PROCEDURE — 88341 IMHCHEM/IMCYTCHM EA ADD ANTB: CPT

## 2023-08-07 PROCEDURE — 88112 CYTOPATH CELL ENHANCE TECH: CPT

## 2023-08-07 PROCEDURE — 84157 ASSAY OF PROTEIN OTHER: CPT

## 2023-08-07 PROCEDURE — 77012 CT SCAN FOR NEEDLE BIOPSY: CPT

## 2023-08-07 PROCEDURE — 89051 BODY FLUID CELL COUNT: CPT

## 2023-08-07 PROCEDURE — 88112 CYTOPATH CELL ENHANCE TECH: CPT | Mod: 26

## 2023-08-07 PROCEDURE — 88305 TISSUE EXAM BY PATHOLOGIST: CPT

## 2023-08-07 PROCEDURE — 87075 CULTR BACTERIA EXCEPT BLOOD: CPT

## 2023-08-07 PROCEDURE — 82042 OTHER SOURCE ALBUMIN QUAN EA: CPT

## 2023-08-07 PROCEDURE — 88342 IMHCHEM/IMCYTCHM 1ST ANTB: CPT | Mod: 26

## 2023-08-07 PROCEDURE — 88341 IMHCHEM/IMCYTCHM EA ADD ANTB: CPT | Mod: 26

## 2023-08-07 PROCEDURE — G1004: CPT

## 2023-08-07 PROCEDURE — 49180 BIOPSY ABDOMINAL MASS: CPT

## 2023-08-07 PROCEDURE — 83615 LACTATE (LD) (LDH) ENZYME: CPT

## 2023-08-07 PROCEDURE — 87102 FUNGUS ISOLATION CULTURE: CPT

## 2023-08-07 PROCEDURE — 82945 GLUCOSE OTHER FLUID: CPT

## 2023-08-07 PROCEDURE — 88305 TISSUE EXAM BY PATHOLOGIST: CPT | Mod: 26

## 2023-08-07 PROCEDURE — C1729: CPT

## 2023-08-07 PROCEDURE — 49083 ABD PARACENTESIS W/IMAGING: CPT

## 2023-08-07 PROCEDURE — 87070 CULTURE OTHR SPECIMN AEROBIC: CPT

## 2023-08-07 PROCEDURE — 88342 IMHCHEM/IMCYTCHM 1ST ANTB: CPT | Mod: 26,59

## 2023-08-07 PROCEDURE — 77012 CT SCAN FOR NEEDLE BIOPSY: CPT | Mod: 26,59

## 2023-08-07 PROCEDURE — 88341 IMHCHEM/IMCYTCHM EA ADD ANTB: CPT | Mod: 26,59

## 2023-08-07 PROCEDURE — 76942 ECHO GUIDE FOR BIOPSY: CPT

## 2023-08-07 PROCEDURE — 88342 IMHCHEM/IMCYTCHM 1ST ANTB: CPT

## 2023-08-07 PROCEDURE — 87205 SMEAR GRAM STAIN: CPT

## 2023-08-07 PROCEDURE — 88360 TUMOR IMMUNOHISTOCHEM/MANUAL: CPT

## 2023-08-07 PROCEDURE — 71250 CT THORAX DX C-: CPT | Mod: MG

## 2023-08-07 PROCEDURE — 71250 CT THORAX DX C-: CPT | Mod: 26,MG

## 2023-08-07 RX ORDER — OLMESARTAN MEDOXOMIL 5 MG/1
1 TABLET, FILM COATED ORAL
Refills: 0 | DISCHARGE

## 2023-08-07 NOTE — ASU DISCHARGE PLAN (ADULT/PEDIATRIC) - NS MD DC FALL RISK RISK
For information on Fall & Injury Prevention, visit: [email or web address] OR  [email or web address] OR  https://www.cdc.gov/steadi/patient.html

## 2023-08-07 NOTE — ASU DISCHARGE PLAN (ADULT/PEDIATRIC) - ASU DC SPECIAL INSTRUCTIONSFT
Biopsy Discharge    Discharge Instructions  - You have had a biopsy of omental lesion.   - You may shower in 24 hours. No soaking or swimming until the site is completely healed.  - Keep the area covered and dry for the next 24 hours.  - Do not perform any heavy lifting for the next few days or until the site is healed.  - You may resume your normal diet.  - You may resume your normal medications however you should wait 48 hours before restarting aspirin, plavix, or blood thinners.  - It is normal to experience some pain over the site for the next few days. You may take apply ice to the area (20 minutes on, 20 minutes off) and take Tylenol for that pain. Do not take more frequently than every 6 hours and do not exceed more than 3000mg of Tylenol in a 24 hour period.    - You were given conscious sedation which may make you drowsy, therefore you need someone to stay with you until the morning following the procedure.  - Do not drive, engage in heavy lifting or strenuous activity, or drink any alcoholic beverages for the next 24 hours.   - You may resume normal activity in 24 hours.    Notify your primary physician and/or Interventional Radiology IMMEDIATELY if you experience any of the following       - Fever of 101F or 38C       - Chills or Rigors/ Shakes       - Swelling and/or Redness in the area around the biopsy site       - Worsening Pain       - Blood soaked bandages or worsening bleeding       - Lightheadedness and/or dizziness upon standing       - Chest Pain/ Tightness       - Shortness of Breath       - Difficulty walking    If you have a problem that you believe requires IMMEDIATE attention, please go to your NEAREST Emergency Room. If you believe your problem can safely wait until you speak to a physician, please call Interventional Radiology for any concerns.    During Normal Weekday Business Hours- You can contact the Interventional Radiology department during normal business hours via telephone.  During Evenings and Weekends- If you need to contact Interventional Radiology during off hours, do so by calling the hospital and requesting to be connected to the Interventional Radiologist on call.

## 2023-08-07 NOTE — PROCEDURE NOTE - PROCEDURE FINDINGS AND DETAILS
Right lower quadrant accessed for paracentesis. 1.6L of serosanguinous fluid removed. Next 18g core x5 obtained of subcentimeter omental implants in the right des-abdomen. These were relatively less vascular than the left and thus, less of bleeding risk.

## 2023-08-08 LAB
GRAM STN FLD: SIGNIFICANT CHANGE UP
SPECIMEN SOURCE: SIGNIFICANT CHANGE UP

## 2023-08-10 ENCOUNTER — APPOINTMENT (OUTPATIENT)
Dept: UROLOGY | Facility: CLINIC | Age: 68
End: 2023-08-10
Payer: MEDICARE

## 2023-08-10 VITALS
DIASTOLIC BLOOD PRESSURE: 73 MMHG | SYSTOLIC BLOOD PRESSURE: 106 MMHG | WEIGHT: 157 LBS | HEIGHT: 66 IN | HEART RATE: 109 BPM | BODY MASS INDEX: 25.23 KG/M2

## 2023-08-10 DIAGNOSIS — Z87.891 PERSONAL HISTORY OF NICOTINE DEPENDENCE: ICD-10-CM

## 2023-08-10 DIAGNOSIS — Z80.8 FAMILY HISTORY OF MALIGNANT NEOPLASM OF OTHER ORGANS OR SYSTEMS: ICD-10-CM

## 2023-08-10 PROCEDURE — 99214 OFFICE O/P EST MOD 30 MIN: CPT

## 2023-08-10 RX ORDER — MELOXICAM 7.5 MG/1
7.5 TABLET ORAL
Qty: 60 | Refills: 0 | Status: DISCONTINUED | COMMUNITY
Start: 2021-01-07 | End: 2023-08-10

## 2023-08-10 NOTE — ASSESSMENT
[FreeTextEntry1] : Impression:  ureteral tumor ureteral obstruction.  Plan:  follow up in one week to review pathology

## 2023-08-10 NOTE — LETTER BODY
[Dear  ___] : Dear  [unfilled], [Courtesy Letter:] : I had the pleasure of seeing your patient, [unfilled], in my office today. [Please see my note below.] : Please see my note below. [Sincerely,] : Sincerely, [FreeTextEntry3] : Ed  Cliff Gregory MD [facility]  of [facility] [place] [employer] at [facility]

## 2023-08-10 NOTE — HISTORY OF PRESENT ILLNESS
[FreeTextEntry1] : The patient was in the hospital and had left sided abdominal pain.  He had paracentesis performed in [facility]. Contineud to have left sided pain  and came back to the [facility]. His son took him to the hospital at [facility].  He was admitted and had a CT done Results below.    ACC: [id number] EXAM: CT ABDOMEN AND PELVIS IC ORDERED BY: STAN TRAORE  PROCEDURE DATE: 08/01/2023    INTERPRETATION: CLINICAL INFORMATION: Left abdominal pain  COMPARISON: None.  CONTRAST/COMPLICATIONS: IV Contrast: Omnipaque 350 90 cc administered 10 cc discarded Oral Contrast: NONE Complications: None reported at time of study completion  PROCEDURE: CT of the Abdomen and Pelvis was performed. Sagittal and coronal reformats were performed.  FINDINGS: LOWER CHEST: Small bilateral atelectasis. Small calcified granuloma in the right lower lobe.  LIVER: Hepatic steatosis BILE DUCTS: Normal caliber. GALLBLADDER: Cholecystectomy clips are present SPLEEN: Within normal limits. PANCREAS: Within normal limits. ADRENALS: Within normal limits. KIDNEYS/URETERS: Apparent 0.7 x 1.1 x 1.8 cm enhancing lesion in the left distal ureter (image 125 series 3) with associated moderate to severe left hydroureteronephrosis. Small right renal cyst.  BLADDER: Within normal limits. REPRODUCTIVE ORGANS: The prostate and seminal vesicles appear grossly unremarkable.  BOWEL: No bowel obstruction. No evidence for acute appendicitis. Apparent mural thickening versus underdistention of the stomach. If clinically indicated, EGD may be pursued for further evaluation. PERITONEUM: Mild ascites in the abdomen and pelvis. No free air. Omental densities bilaterally may represent omental carcinomatosis. VESSELS: Calcified atherosclerotic disease. RETROPERITONEUM/LYMPH NODES: No lymphadenopathy. ABDOMINAL WALL: Small fat-containing umbilical hernia. BONES: Degenerative spondylosis.  IMPRESSION: Apparent 0.7 x 1.1 x 1.8 cm enhancing mass lesion in the left distal ureter with associated moderate to severe left hydroureteronephrosis.  Apparent mural thickening versus underdistention of the stomach. If clinically indicated, EGD may be pursued for further evaluation.  Mild ascites in the abdomen and pelvis.  Omental densities bilaterally may represent omental carcinomatosis.  --- End of Report ---      JOHNNY SALDANA MD; Attending Radiologist This document has been electronically signed. Aug 1 2023 9:26AM  He was inhe hospital for 7 days.  He had a left neph tube palced and felt immediate relief of the pain.   He then had a biopsy of the omentum and paracentesis here.at [facility].  Note form that below.   OCEDURE: - Procedure Name	Interventional Radiology - Procedure Name	CT guided omental biopsy. Ultrasound guided diagnostic and therapeutic paracentesis. - Procedure Date/Time	07-Aug-2023 15:18 - Informed Consent	Benefits, risks, and possible complications of procedure explained to patient/caregiver who verbalized understanding and gave written consent. - Procedure Performed By	Myself - Access Site (if applicable)	RLQ - Specimen Obtained	Cores given to Cytopathology Technologist/Pathologist - Estimated Blood Loss	Minimal - Complications	No complications - Contrast	None - Sedation	Provided by Anesthesia Department - Local Anesthesia	1% Lidocaine - Procedure Findings and Details	Right lower quadrant accessed for paracentesis. 1.6L of serosanguinous fluid removed. Next 18g core x5 obtained of subcentimeter omental implants in the right des-abdomen. These were relatively less vascular than the left and thus, less of bleeding risk. - Patient Condition/Disposition	Recovery, then home if stable, 2 hours - Plan	- 2 hour recovery then home - to follow up with outpatient physician regarding results of biopsy.     Electronic Signatures: Preet Biswas)  (Signed 07-Aug-2023 15:22) 	Authored: PROCEDURE     Last Updated: 07-Aug-2023 15:22 by Preet Biswas)  The patinet's neph tube is draining well and clear yellow.  Pathology is pending.

## 2023-08-12 LAB
CULTURE RESULTS: SIGNIFICANT CHANGE UP
SPECIMEN SOURCE: SIGNIFICANT CHANGE UP

## 2023-08-15 LAB — NON-GYNECOLOGICAL CYTOLOGY STUDY: SIGNIFICANT CHANGE UP

## 2023-08-17 ENCOUNTER — APPOINTMENT (OUTPATIENT)
Dept: UROLOGY | Facility: CLINIC | Age: 68
End: 2023-08-17
Payer: MEDICARE

## 2023-08-17 VITALS — HEART RATE: 114 BPM | SYSTOLIC BLOOD PRESSURE: 111 MMHG | DIASTOLIC BLOOD PRESSURE: 75 MMHG

## 2023-08-17 PROCEDURE — 99214 OFFICE O/P EST MOD 30 MIN: CPT

## 2023-08-17 NOTE — LETTER BODY
[Dear  ___] : Dear  [unfilled], [Courtesy Letter:] : I had the pleasure of seeing your patient, [unfilled], in my office today. [Please see my note below.] : Please see my note below. [Sincerely,] : Sincerely, [FreeTextEntry3] : Ed  Cliff Gregory MD [facility]  of [facility] [place] [facility] [employer] at [facility]

## 2023-08-17 NOTE — ASSESSMENT
[FreeTextEntry1] : Impression: tumor, not as yet typed, malignant  Plan:  schedule left ureteroscopy with biopsy/resection on left incase not able to get adequate results from  path risks, benefits and alternatives explained.  Long discussion wit h pathologist at [facility] done.

## 2023-08-17 NOTE — HISTORY OF PRESENT ILLNESS
[FreeTextEntry1] : The patient is here to review pathology.  Has neph tube.  Had a paracentesis and omental biopsy. Cytology was positive for malignant cells.  Path is pending and I had a discussion with the pathologist.  Immunohistochemical stains pending.

## 2023-08-22 ENCOUNTER — OUTPATIENT (OUTPATIENT)
Dept: OUTPATIENT SERVICES | Facility: HOSPITAL | Age: 68
LOS: 1 days | End: 2023-08-22
Payer: MEDICARE

## 2023-08-22 VITALS
DIASTOLIC BLOOD PRESSURE: 80 MMHG | OXYGEN SATURATION: 97 % | SYSTOLIC BLOOD PRESSURE: 110 MMHG | TEMPERATURE: 97 F | WEIGHT: 167.55 LBS | RESPIRATION RATE: 18 BRPM | HEIGHT: 66 IN | HEART RATE: 98 BPM

## 2023-08-22 DIAGNOSIS — C68.9 MALIGNANT NEOPLASM OF URINARY ORGAN, UNSPECIFIED: ICD-10-CM

## 2023-08-22 DIAGNOSIS — D49.59 NEOPLASM OF UNSPECIFIED BEHAVIOR OF OTHER GENITOURINARY ORGAN: ICD-10-CM

## 2023-08-22 DIAGNOSIS — Z98.890 OTHER SPECIFIED POSTPROCEDURAL STATES: Chronic | ICD-10-CM

## 2023-08-22 DIAGNOSIS — Z91.89 OTHER SPECIFIED PERSONAL RISK FACTORS, NOT ELSEWHERE CLASSIFIED: ICD-10-CM

## 2023-08-22 DIAGNOSIS — Z90.49 ACQUIRED ABSENCE OF OTHER SPECIFIED PARTS OF DIGESTIVE TRACT: Chronic | ICD-10-CM

## 2023-08-22 DIAGNOSIS — E11.9 TYPE 2 DIABETES MELLITUS WITHOUT COMPLICATIONS: ICD-10-CM

## 2023-08-22 DIAGNOSIS — G47.33 OBSTRUCTIVE SLEEP APNEA (ADULT) (PEDIATRIC): ICD-10-CM

## 2023-08-22 DIAGNOSIS — I10 ESSENTIAL (PRIMARY) HYPERTENSION: ICD-10-CM

## 2023-08-22 DIAGNOSIS — Z01.818 ENCOUNTER FOR OTHER PREPROCEDURAL EXAMINATION: ICD-10-CM

## 2023-08-22 LAB
A1C WITH ESTIMATED AVERAGE GLUCOSE RESULT: 8.9 % — HIGH (ref 4–5.6)
ANION GAP SERPL CALC-SCNC: 14 MMOL/L — SIGNIFICANT CHANGE UP (ref 5–17)
APPEARANCE UR: ABNORMAL
APTT BLD: 28.9 SEC — SIGNIFICANT CHANGE UP (ref 24.5–35.6)
BACTERIA # UR AUTO: ABNORMAL
BASOPHILS # BLD AUTO: 0.03 K/UL — SIGNIFICANT CHANGE UP (ref 0–0.2)
BASOPHILS NFR BLD AUTO: 0.3 % — SIGNIFICANT CHANGE UP (ref 0–2)
BILIRUB UR-MCNC: NEGATIVE — SIGNIFICANT CHANGE UP
BLD GP AB SCN SERPL QL: SIGNIFICANT CHANGE UP
BUN SERPL-MCNC: 10 MG/DL — SIGNIFICANT CHANGE UP (ref 8–20)
CALCIUM SERPL-MCNC: 9 MG/DL — SIGNIFICANT CHANGE UP (ref 8.4–10.5)
CHLORIDE SERPL-SCNC: 97 MMOL/L — SIGNIFICANT CHANGE UP (ref 96–108)
CO2 SERPL-SCNC: 23 MMOL/L — SIGNIFICANT CHANGE UP (ref 22–29)
COLOR SPEC: YELLOW — SIGNIFICANT CHANGE UP
CREAT SERPL-MCNC: 0.79 MG/DL — SIGNIFICANT CHANGE UP (ref 0.5–1.3)
DIFF PNL FLD: ABNORMAL
EGFR: 99 ML/MIN/1.73M2 — SIGNIFICANT CHANGE UP
EOSINOPHIL # BLD AUTO: 0.19 K/UL — SIGNIFICANT CHANGE UP (ref 0–0.5)
EOSINOPHIL NFR BLD AUTO: 2.2 % — SIGNIFICANT CHANGE UP (ref 0–6)
EPI CELLS # UR: SIGNIFICANT CHANGE UP
ESTIMATED AVERAGE GLUCOSE: 209 MG/DL — HIGH (ref 68–114)
GLUCOSE SERPL-MCNC: 196 MG/DL — HIGH (ref 70–99)
GLUCOSE UR QL: NEGATIVE MG/DL — SIGNIFICANT CHANGE UP
HCT VFR BLD CALC: 39 % — SIGNIFICANT CHANGE UP (ref 39–50)
HGB BLD-MCNC: 13.2 G/DL — SIGNIFICANT CHANGE UP (ref 13–17)
IMM GRANULOCYTES NFR BLD AUTO: 0.9 % — SIGNIFICANT CHANGE UP (ref 0–0.9)
INR BLD: 0.95 RATIO — SIGNIFICANT CHANGE UP (ref 0.85–1.18)
KETONES UR-MCNC: NEGATIVE — SIGNIFICANT CHANGE UP
LEUKOCYTE ESTERASE UR-ACNC: NEGATIVE — SIGNIFICANT CHANGE UP
LYMPHOCYTES # BLD AUTO: 1.09 K/UL — SIGNIFICANT CHANGE UP (ref 1–3.3)
LYMPHOCYTES # BLD AUTO: 12.6 % — LOW (ref 13–44)
MCHC RBC-ENTMCNC: 29.3 PG — SIGNIFICANT CHANGE UP (ref 27–34)
MCHC RBC-ENTMCNC: 33.8 GM/DL — SIGNIFICANT CHANGE UP (ref 32–36)
MCV RBC AUTO: 86.7 FL — SIGNIFICANT CHANGE UP (ref 80–100)
MONOCYTES # BLD AUTO: 0.7 K/UL — SIGNIFICANT CHANGE UP (ref 0–0.9)
MONOCYTES NFR BLD AUTO: 8.1 % — SIGNIFICANT CHANGE UP (ref 2–14)
NEUTROPHILS # BLD AUTO: 6.59 K/UL — SIGNIFICANT CHANGE UP (ref 1.8–7.4)
NEUTROPHILS NFR BLD AUTO: 75.9 % — SIGNIFICANT CHANGE UP (ref 43–77)
NITRITE UR-MCNC: NEGATIVE — SIGNIFICANT CHANGE UP
PH UR: 6 — SIGNIFICANT CHANGE UP (ref 5–8)
PLATELET # BLD AUTO: 276 K/UL — SIGNIFICANT CHANGE UP (ref 150–400)
POTASSIUM SERPL-MCNC: 4.3 MMOL/L — SIGNIFICANT CHANGE UP (ref 3.5–5.3)
POTASSIUM SERPL-SCNC: 4.3 MMOL/L — SIGNIFICANT CHANGE UP (ref 3.5–5.3)
PROT UR-MCNC: 15
PROTHROM AB SERPL-ACNC: 10.6 SEC — SIGNIFICANT CHANGE UP (ref 9.5–13)
RBC # BLD: 4.5 M/UL — SIGNIFICANT CHANGE UP (ref 4.2–5.8)
RBC # FLD: 13 % — SIGNIFICANT CHANGE UP (ref 10.3–14.5)
RBC CASTS # UR COMP ASSIST: SIGNIFICANT CHANGE UP /HPF (ref 0–4)
SODIUM SERPL-SCNC: 134 MMOL/L — LOW (ref 135–145)
SP GR SPEC: 1.01 — SIGNIFICANT CHANGE UP (ref 1.01–1.02)
UROBILINOGEN FLD QL: NEGATIVE MG/DL — SIGNIFICANT CHANGE UP
WBC # BLD: 8.68 K/UL — SIGNIFICANT CHANGE UP (ref 3.8–10.5)
WBC # FLD AUTO: 8.68 K/UL — SIGNIFICANT CHANGE UP (ref 3.8–10.5)
WBC UR QL: SIGNIFICANT CHANGE UP /HPF (ref 0–5)

## 2023-08-22 PROCEDURE — 93010 ELECTROCARDIOGRAM REPORT: CPT

## 2023-08-22 NOTE — H&P PST ADULT - NSICDXFAMILYHX_GEN_ALL_CORE_FT
FAMILY HISTORY:  Sibling  Still living? Unknown  FH: brain cancer, Age at diagnosis: Age Unknown

## 2023-08-22 NOTE — H&P PST ADULT - PROBLEM SELECTOR PLAN 1
medical clearance pending  Patient is scheduled for left ureteroscopy with biopsy, resection of left ureteral tumor on 8/24/23 with Dr. Gregory.

## 2023-08-22 NOTE — H&P PST ADULT - ASSESSMENT
Patient educated on surgical scrub, preadmission instructions, medical clearance and day of procedure medications, verbalizes understanding. Pt instructed to stop vitamins/supplements/herbal medications/ASA/NSAIDS for one week prior to surgery and discuss with PMD.    CAPRINI SCORE    AGE RELATED RISK FACTORS                                                             [ ] Age 41-60 years                                            (1 Point)  [ ] Age: 61-74 years                                           (2 Points)                 [ ] Age= 75 years                                                (3 Points)             DISEASE RELATED RISK FACTORS                                                       [ ] Edema in the lower extremities                 (1 Point)                     [ ] Varicose veins                                               (1 Point)                                 [ ] BMI > 25 Kg/m2                                            (1 Point)                                  [ ] Serious infection (ie PNA)                            (1 Point)                     [ ] Lung disease ( COPD, Emphysema)            (1 Point)                                                                          [ ] Acute myocardial infarction                         (1 Point)                  [ ] Congestive heart failure (in the previous month)  (1 Point)         [ ] Inflammatory bowel disease                            (1 Point)                  [ ] Central venous access, PICC or Port               (2 points)       (within the last month)                                                                [ ] Stroke (in the previous month)                        (5 Points)    [ ] Previous or present malignancy                       (2 points)                                                                                                                                                         HEMATOLOGY RELATED FACTORS                                                         [ ] Prior episodes of VTE                                     (3 Points)                     [ ] Positive family history for VTE                      (3 Points)                  [ ] Prothrombin 68642 A                                     (3 Points)                     [ ] Factor V Leiden                                                (3 Points)                        [ ] Lupus anticoagulants                                      (3 Points)                                                           [ ] Anticardiolipin antibodies                              (3 Points)                                                       [ ] High homocysteine in the blood                   (3 Points)                                             [ ] Other congenital or acquired thrombophilia      (3 Points)                                                [ ] Heparin induced thrombocytopenia                  (3 Points)                                        MOBILITY RELATED FACTORS  [ ] Bed rest                                                         (1 Point)  [ ] Plaster cast                                                    (2 points)  [ ] Bed bound for more than 72 hours           (2 Points)    GENDER SPECIFIC FACTORS  [ ] Pregnancy or had a baby within the last month   (1 Point)  [ ] Post-partum < 6 weeks                                   (1 Point)  [ ] Hormonal therapy  or oral contraception   (1 Point)  [ ] History of pregnancy complications              (1 point)  [ ] Unexplained or recurrent              (1 Point)    OTHER RISK FACTORS                                           (1 Point)  [ ] BMI >40, smoking, diabetes requiring insulin, chemotherapy  blood transfusions and length of surgery over 2 hours    SURGERY RELATED RISK FACTORS  [ ]  Section within the last month     (1 Point)  [ ] Minor surgery                                                  (1 Point)  [ ] Arthroscopic surgery                                       (2 Points)  [ ] Planned major surgery lasting more            (2 Points)      than 45 minutes     [ ] Elective hip or knee joint replacement       (5 points)       surgery                                                TRAUMA RELATED RISK FACTORS  [ ] Fracture of the hip, pelvis, or leg                       (5 Points)  [ ] Spinal cord injury resulting in paralysis             (5 points)       (in the previous month)    [ ] Paralysis  (less than 1 month)                             (5 Points)  [ ] Multiple Trauma within 1 month                        (5 Points)    Total Score [        ]    Caprini Score 0-2: Low Risk, NO VTE prophylaxis required for most patients, encourage ambulation  Caprini Score 3-6: Moderate Risk , pharmacologic VTE prophylaxis is indicated for most patients (in the absence of contraindications)  Caprini Score Greater than or =7: High risk, pharmocologic VTE prophylaxis indicated for most patients (in the absence of contraindications)    OPIOID RISK TOOL    ALEKSANDRA EACH BOX THAT APPLIES AND ADD TOTALS AT THE END    FAMILY HISTORY OF SUBSTANCE ABUSE                 FEMALE         MALE                                                Alcohol                             [  ]1 pt          [  ]3pts                                               Illegal Durgs                     [  ]2 pts        [  ]3pts                                               Rx Drugs                           [  ]4 pts        [  ]4 pts    PERSONAL HISTORY OF SUBSTANCE ABUSE                                                                                          Alcohol                             [  ]3 pts       [  ]3 pts                                               Illegal Drugs                     [  ]4 pts        [  ]4 pts                                               Rx Drugs                           [  ]5 pts        [  ]5 pts    AGE BETWEEN 16-45 YEARS                                      [  ]1 pt         [  ]1 pt    HISTORY OF PREADOLESCENT   SEXUAL ABUSE                                                             [  ]3 pts        [  ]0pts    PSYCHOLOGICAL DISEASE                     ADD, OCD, Bipolar, Schizophrenia        [  ]2 pts         [  ]2 pts                      Depression                                               [  ]1 pt           [  ]1 pt           SCORING TOTAL   (add numbers and type here)              (***)                                     A score of 3 or lower indicated LOW risk for future opioid abuse  A score of 4 to 7 indicated moderate risk for future opioid abuse  A score of 8 or higher indicates a high risk for opioid abuse   66 y/o Czech speaking male with PMH of HTN presents to [facility] with complaints of having a tumor on his ureter. States he suddenly started to experience pain on his left side while visiting his home country [facility], he came back to [place] and went straight to [facility] for evaluation. CT abdomen and pelvis done on 23 showed Apparent 0.7 x 1.1 x 1.8 cm enhancing mass lesion in the left distal ureter with associated moderate to severe left hydroureteronephrosis. He had a left nephrostomy placed in IR on 23, which has helped to relieve the pain. Left nephrostomy tube draining well. Reports occasional dysuria. Denies fevers, chills, nausea, vomiting or hematuria. Patient is scheduled for left ureteroscopy with biopsy, resection of left ureteral tumor on 23 with Dr. Gregory. Patient educated on surgical scrub, preadmission instructions, medical clearance and day of procedure medications, verbalizes understanding. Pt instructed to stop vitamins/supplements/herbal medications/ASA/NSAIDS for one week prior to surgery and discuss with PMD.    CAPRINI SCORE    AGE RELATED RISK FACTORS                                                             [ ] Age 41-60 years                                            (1 Point)  [x ] Age: 61-74 years                                           (2 Points)                 [ ] Age= 75 years                                                (3 Points)             DISEASE RELATED RISK FACTORS                                                       [ ] Edema in the lower extremities                 (1 Point)                     [ ] Varicose veins                                               (1 Point)                                 [x ] BMI > 25 Kg/m2                                            (1 Point)                                  [ ] Serious infection (ie PNA)                            (1 Point)                     [ ] Lung disease ( COPD, Emphysema)            (1 Point)                                                                          [ ] Acute myocardial infarction                         (1 Point)                  [ ] Congestive heart failure (in the previous month)  (1 Point)         [ ] Inflammatory bowel disease                            (1 Point)                  [ ] Central venous access, PICC or Port               (2 points)       (within the last month)                                                                [ ] Stroke (in the previous month)                        (5 Points)    [ ] Previous or present malignancy                       (2 points)                                                                                                                                                         HEMATOLOGY RELATED FACTORS                                                         [ ] Prior episodes of VTE                                     (3 Points)                     [ ] Positive family history for VTE                      (3 Points)                  [ ] Prothrombin 13638 A                                     (3 Points)                     [ ] Factor V Leiden                                                (3 Points)                        [ ] Lupus anticoagulants                                      (3 Points)                                                           [ ] Anticardiolipin antibodies                              (3 Points)                                                       [ ] High homocysteine in the blood                   (3 Points)                                             [ ] Other congenital or acquired thrombophilia      (3 Points)                                                [ ] Heparin induced thrombocytopenia                  (3 Points)                                        MOBILITY RELATED FACTORS  [ ] Bed rest                                                         (1 Point)  [ ] Plaster cast                                                    (2 points)  [ ] Bed bound for more than 72 hours           (2 Points)    GENDER SPECIFIC FACTORS  [ ] Pregnancy or had a baby within the last month   (1 Point)  [ ] Post-partum < 6 weeks                                   (1 Point)  [ ] Hormonal therapy  or oral contraception   (1 Point)  [ ] History of pregnancy complications              (1 point)  [ ] Unexplained or recurrent              (1 Point)    OTHER RISK FACTORS                                           (1 Point)  [x ] BMI >40, smoking, diabetes requiring insulin, chemotherapy  blood transfusions and length of surgery over 2 hours    SURGERY RELATED RISK FACTORS  [ ]  Section within the last month     (1 Point)  [ ] Minor surgery                                                  (1 Point)  [ ] Arthroscopic surgery                                       (2 Points)  [x ] Planned major surgery lasting more            (2 Points)      than 45 minutes     [ ] Elective hip or knee joint replacement       (5 points)       surgery                                                TRAUMA RELATED RISK FACTORS  [ ] Fracture of the hip, pelvis, or leg                       (5 Points)  [ ] Spinal cord injury resulting in paralysis             (5 points)       (in the previous month)    [ ] Paralysis  (less than 1 month)                             (5 Points)  [ ] Multiple Trauma within 1 month                        (5 Points)    Total Score [   6     ]    Caprini Score 0-2: Low Risk, NO VTE prophylaxis required for most patients, encourage ambulation  Caprini Score 3-6: Moderate Risk , pharmacologic VTE prophylaxis is indicated for most patients (in the absence of contraindications)  Caprini Score Greater than or =7: High risk, pharmocologic VTE prophylaxis indicated for most patients (in the absence of contraindications)    OPIOID RISK TOOL    ALEKSANDRA EACH BOX THAT APPLIES AND ADD TOTALS AT THE END    FAMILY HISTORY OF SUBSTANCE ABUSE                 FEMALE         MALE                                                Alcohol                             [  ]1 pt          [  ]3pts                                               Illegal Durgs                     [  ]2 pts        [  ]3pts                                               Rx Drugs                           [  ]4 pts        [  ]4 pts    PERSONAL HISTORY OF SUBSTANCE ABUSE                                                                                          Alcohol                             [  ]3 pts       [  ]3 pts                                               Illegal Drugs                     [  ]4 pts        [  ]4 pts                                               Rx Drugs                           [  ]5 pts        [  ]5 pts    AGE BETWEEN 16-45 YEARS                                      [  ]1 pt         [  ]1 pt    HISTORY OF PREADOLESCENT   SEXUAL ABUSE                                                             [  ]3 pts        [  ]0pts    PSYCHOLOGICAL DISEASE                     ADD, OCD, Bipolar, Schizophrenia        [  ]2 pts         [  ]2 pts                      Depression                                               [  ]1 pt           [  ]1 pt           SCORING TOTAL   (add numbers and type here)              (**0*)                                     A score of 3 or lower indicated LOW risk for future opioid abuse  A score of 4 to 7 indicated moderate risk for future opioid abuse  A score of 8 or higher indicates a high risk for opioid abuse

## 2023-08-22 NOTE — H&P PST ADULT - HISTORY OF PRESENT ILLNESS
66 y/o Albanian speaking male with PMH of     Patient is scheduled for left ureteroscopy with biopsy, resection of left ureteral tumor on 8/24/23 with Dr. Gregory.  This was a shared visit with the EDMAR. I reviewed and verified the documentation and independently performed the documented: 66 y/o Syriac speaking male with PMH of HTN presents to [facility] with complaints of having a tumor on his ureter. States he suddenly started to experience pain on his left side while visiting his home country [facility], he came back to [place] and went straight to [facility] for evalation. CT abdomen and pelvis done on 8/1/23 showed Apparent 0.7 x 1.1 x 1.8 cm enhancing mass lesion in the left distal ureter with associated moderate to severe left hydroureteronephrosis.      He was seen at [facility] and had a left nephrostomy placed which has helped to relieve the pain. Left nephrostomy tube draining well.   Reports occasional dysuria. Denies fevers, chills, nausea, vomiting or hematuria. Patient is scheduled for left ureteroscopy with biopsy, resection of left ureteral tumor on 8/24/23 with Dr. Gregory.  66 y/o Bengali speaking male with PMH of HTN presents to [facility] [place] with complaints of having a tumor on his ureter. States he suddenly started to experience pain on his left side while visiting his home country [facility], he came back to [place] and went straight to [facility] for evaluation. CT abdomen and pelvis done on 8/1/23 showed Apparent 0.7 x 1.1 x 1.8 cm enhancing mass lesion in the left distal ureter with associated moderate to severe left hydroureteronephrosis. He had a left nephrostomy placed in IR on 8/1/23, which has helped to relieve the pain. Left nephrostomy tube draining well. Reports occasional dysuria. Denies fevers, chills, nausea, vomiting or hematuria. Patient is scheduled for left ureteroscopy with biopsy, resection of left ureteral tumor on 8/24/23 with Dr. Gregory.  68 y/o Greenlandic speaking male with PMH of HTN and DM not currently on medication presents to [facility] with complaints of having a tumor on his ureter. States he suddenly started to experience pain on his left side while visiting his home country [facility], he came back to [place] and went straight to [facility] for evaluation. CT abdomen and pelvis done on 8/1/23 showed Apparent 0.7 x 1.1 x 1.8 cm enhancing mass lesion in the left distal ureter with associated moderate to severe left hydroureteronephrosis. He had a left nephrostomy placed in IR on 8/1/23, which has helped to relieve the pain. Left nephrostomy tube draining well. Reports occasional dysuria. Denies fevers, chills, nausea, vomiting or hematuria. Patient is scheduled for left ureteroscopy with biopsy, resection of left ureteral tumor on 8/24/23 with Dr. Gregory.

## 2023-08-22 NOTE — H&P PST ADULT - EKG AND INTERPRETATION
NSR low voltage QRS T wave abnormality consider anterior ischemia prolonged QT VR 98, official read pending

## 2023-08-22 NOTE — H&P PST ADULT - NSICDXPASTMEDICALHX_GEN_ALL_CORE_FT
PAST MEDICAL HISTORY:  HTN (hypertension)      PAST MEDICAL HISTORY:  Diabetes     HTN (hypertension)

## 2023-08-22 NOTE — H&P PST ADULT - PROBLEM SELECTOR PLAN 5
A1C done at PST 8.6, called PCP office spoke with Gayle SMITH, made aware of A1C result, as per Gayle patient was diagnosed with diabetes in 2021 was prescribed Metformin at that time but has not maintained the prescription.   Blood sugar monitoring A1C done at PST 8.6, called PCP office spoke with Gayle SMITH, made aware of A1C result, as per Gayle patient was diagnosed with diabetes in 2021 was prescribed Metformin at that time but has not maintained the prescription.   Blood sugar monitoring  Attempted to call patient and his son to discuss A1C level, no answer and voicemail not set up. AS FNP-BC 8/22/2023 1358

## 2023-08-23 ENCOUNTER — TRANSCRIPTION ENCOUNTER (OUTPATIENT)
Age: 68
End: 2023-08-23

## 2023-08-23 ENCOUNTER — NON-APPOINTMENT (OUTPATIENT)
Age: 68
End: 2023-08-23

## 2023-08-23 LAB
CULTURE RESULTS: SIGNIFICANT CHANGE UP
SPECIMEN SOURCE: SIGNIFICANT CHANGE UP

## 2023-08-24 ENCOUNTER — TRANSCRIPTION ENCOUNTER (OUTPATIENT)
Age: 68
End: 2023-08-24

## 2023-08-24 ENCOUNTER — APPOINTMENT (OUTPATIENT)
Dept: UROLOGY | Facility: HOSPITAL | Age: 68
End: 2023-08-24

## 2023-08-24 ENCOUNTER — OUTPATIENT (OUTPATIENT)
Dept: OUTPATIENT SERVICES | Facility: HOSPITAL | Age: 68
LOS: 1 days | End: 2023-08-24
Payer: MEDICARE

## 2023-08-24 ENCOUNTER — RESULT REVIEW (OUTPATIENT)
Age: 68
End: 2023-08-24

## 2023-08-24 VITALS
HEART RATE: 92 BPM | DIASTOLIC BLOOD PRESSURE: 62 MMHG | OXYGEN SATURATION: 96 % | RESPIRATION RATE: 20 BRPM | SYSTOLIC BLOOD PRESSURE: 112 MMHG

## 2023-08-24 VITALS — WEIGHT: 167.55 LBS | HEIGHT: 66 IN

## 2023-08-24 DIAGNOSIS — Z98.890 OTHER SPECIFIED POSTPROCEDURAL STATES: Chronic | ICD-10-CM

## 2023-08-24 DIAGNOSIS — Z90.49 ACQUIRED ABSENCE OF OTHER SPECIFIED PARTS OF DIGESTIVE TRACT: Chronic | ICD-10-CM

## 2023-08-24 DIAGNOSIS — D49.59 NEOPLASM OF UNSPECIFIED BEHAVIOR OF OTHER GENITOURINARY ORGAN: ICD-10-CM

## 2023-08-24 LAB
ABO RH CONFIRMATION: SIGNIFICANT CHANGE UP
ANION GAP SERPL CALC-SCNC: 17 MMOL/L — SIGNIFICANT CHANGE UP (ref 5–17)
BUN SERPL-MCNC: 10.3 MG/DL — SIGNIFICANT CHANGE UP (ref 8–20)
CALCIUM SERPL-MCNC: 8.9 MG/DL — SIGNIFICANT CHANGE UP (ref 8.4–10.5)
CHLORIDE SERPL-SCNC: 98 MMOL/L — SIGNIFICANT CHANGE UP (ref 96–108)
CO2 SERPL-SCNC: 20 MMOL/L — LOW (ref 22–29)
CREAT SERPL-MCNC: 0.8 MG/DL — SIGNIFICANT CHANGE UP (ref 0.5–1.3)
EGFR: 98 ML/MIN/1.73M2 — SIGNIFICANT CHANGE UP
GLUCOSE BLDC GLUCOMTR-MCNC: 156 MG/DL — HIGH (ref 70–99)
GLUCOSE BLDC GLUCOMTR-MCNC: 156 MG/DL — HIGH (ref 70–99)
GLUCOSE BLDC GLUCOMTR-MCNC: 163 MG/DL — HIGH (ref 70–99)
GLUCOSE SERPL-MCNC: 167 MG/DL — HIGH (ref 70–99)
POTASSIUM SERPL-MCNC: 4.5 MMOL/L — SIGNIFICANT CHANGE UP (ref 3.5–5.3)
POTASSIUM SERPL-SCNC: 4.5 MMOL/L — SIGNIFICANT CHANGE UP (ref 3.5–5.3)
SODIUM SERPL-SCNC: 135 MMOL/L — SIGNIFICANT CHANGE UP (ref 135–145)

## 2023-08-24 PROCEDURE — 88305 TISSUE EXAM BY PATHOLOGIST: CPT | Mod: 26

## 2023-08-24 PROCEDURE — 52354 CYSTOURETERO W/BIOPSY: CPT | Mod: LT

## 2023-08-24 PROCEDURE — 88342 IMHCHEM/IMCYTCHM 1ST ANTB: CPT | Mod: 26

## 2023-08-24 PROCEDURE — 88341 IMHCHEM/IMCYTCHM EA ADD ANTB: CPT | Mod: 26

## 2023-08-24 PROCEDURE — 74420 UROGRAPHY RTRGR +-KUB: CPT | Mod: 26,LT

## 2023-08-24 DEVICE — URETERAL STENT CONTOUR 6FR 26CM: Type: IMPLANTABLE DEVICE | Site: LEFT | Status: FUNCTIONAL

## 2023-08-24 RX ORDER — ACETAMINOPHEN 500 MG
975 TABLET ORAL ONCE
Refills: 0 | Status: COMPLETED | OUTPATIENT
Start: 2023-08-24 | End: 2023-08-24

## 2023-08-24 RX ORDER — SODIUM CHLORIDE 9 MG/ML
1000 INJECTION, SOLUTION INTRAVENOUS
Refills: 0 | Status: DISCONTINUED | OUTPATIENT
Start: 2023-08-24 | End: 2023-09-08

## 2023-08-24 RX ORDER — CEPHALEXIN 500 MG
1 CAPSULE ORAL
Qty: 12 | Refills: 0
Start: 2023-08-24

## 2023-08-24 RX ORDER — CEFAZOLIN SODIUM 1 G
2000 VIAL (EA) INJECTION ONCE
Refills: 0 | Status: DISCONTINUED | OUTPATIENT
Start: 2023-08-24 | End: 2023-08-24

## 2023-08-24 RX ORDER — FENTANYL CITRATE 50 UG/ML
25 INJECTION INTRAVENOUS
Refills: 0 | Status: DISCONTINUED | OUTPATIENT
Start: 2023-08-24 | End: 2023-08-25

## 2023-08-24 RX ORDER — FENTANYL CITRATE 50 UG/ML
50 INJECTION INTRAVENOUS
Refills: 0 | Status: DISCONTINUED | OUTPATIENT
Start: 2023-08-24 | End: 2023-08-25

## 2023-08-24 RX ORDER — SODIUM CHLORIDE 9 MG/ML
1000 INJECTION, SOLUTION INTRAVENOUS
Refills: 0 | Status: DISCONTINUED | OUTPATIENT
Start: 2023-08-24 | End: 2023-08-25

## 2023-08-24 RX ORDER — ONDANSETRON 8 MG/1
4 TABLET, FILM COATED ORAL ONCE
Refills: 0 | Status: DISCONTINUED | OUTPATIENT
Start: 2023-08-24 | End: 2023-08-25

## 2023-08-24 RX ADMIN — Medication 975 MILLIGRAM(S): at 16:03

## 2023-08-24 NOTE — ASU DISCHARGE PLAN (ADULT/PEDIATRIC) - "IF YOU OR YOUR GUARDIAN/FAMILY IS A SMOKER, IT IS IMPORTANT FOR YOUR HEALTH TO STOP SMOKING. PLEASE BE AWARE THAT SECOND HAND SMOKE IS ALSO HARMFUL."
Called pt twice, straight to voicemail, voicemail is full, cannot leave message. Pt was seen by pcp once. Med is noted as managed by psych. Never prescribed by pcp. Statement Selected

## 2023-08-24 NOTE — BRIEF OPERATIVE NOTE - NSICDXBRIEFPROCEDURE_GEN_ALL_CORE_FT
PROCEDURES:  Ureterorenoscopy with biopsy 24-Aug-2023 19:20:13  Cliff Gregory  Retrograde pyelogram 24-Aug-2023 19:24:34  Cliff Gregory

## 2023-08-24 NOTE — ASU DISCHARGE PLAN (ADULT/PEDIATRIC) - NS MD DC FALL RISK RISK
For information on Fall & Injury Prevention, visit: [email or web address] OR  [email or web address] OR  https://www.cdc.gov/steadi/patient.html

## 2023-08-24 NOTE — ASU DISCHARGE PLAN (ADULT/PEDIATRIC) - CARE PROVIDER_API CALL
Clfif Gregory  Urology  [address] [place] [id number]  Phone: [phone]  Fax: [phone]  Established Patient  Follow Up Time: 2 weeks

## 2023-08-25 ENCOUNTER — INPATIENT (INPATIENT)
Facility: HOSPITAL | Age: 68
LOS: 2 days | Discharge: HOME | DRG: 163 | End: 2023-08-28
Attending: STUDENT IN AN ORGANIZED HEALTH CARE EDUCATION/TRAINING PROGRAM | Admitting: HOSPITALIST
Payer: MEDICARE

## 2023-08-25 VITALS
HEART RATE: 132 BPM | TEMPERATURE: 98 F | SYSTOLIC BLOOD PRESSURE: 123 MMHG | RESPIRATION RATE: 26 BRPM | HEIGHT: 66 IN | WEIGHT: 160.06 LBS | OXYGEN SATURATION: 96 % | DIASTOLIC BLOOD PRESSURE: 84 MMHG

## 2023-08-25 DIAGNOSIS — Z98.890 OTHER SPECIFIED POSTPROCEDURAL STATES: Chronic | ICD-10-CM

## 2023-08-25 DIAGNOSIS — I26.99 OTHER PULMONARY EMBOLISM WITHOUT ACUTE COR PULMONALE: ICD-10-CM

## 2023-08-25 DIAGNOSIS — J96.01 ACUTE RESPIRATORY FAILURE WITH HYPOXIA: ICD-10-CM

## 2023-08-25 DIAGNOSIS — I10 ESSENTIAL (PRIMARY) HYPERTENSION: ICD-10-CM

## 2023-08-25 DIAGNOSIS — E11.65 TYPE 2 DIABETES MELLITUS WITH HYPERGLYCEMIA: ICD-10-CM

## 2023-08-25 DIAGNOSIS — Z90.49 ACQUIRED ABSENCE OF OTHER SPECIFIED PARTS OF DIGESTIVE TRACT: Chronic | ICD-10-CM

## 2023-08-25 DIAGNOSIS — E87.1 HYPO-OSMOLALITY AND HYPONATREMIA: ICD-10-CM

## 2023-08-25 DIAGNOSIS — I26.92 SADDLE EMBOLUS OF PULMONARY ARTERY WITHOUT ACUTE COR PULMONALE: ICD-10-CM

## 2023-08-25 DIAGNOSIS — R09.89 OTHER SPECIFIED SYMPTOMS AND SIGNS INVOLVING THE CIRCULATORY AND RESPIRATORY SYSTEMS: ICD-10-CM

## 2023-08-25 PROBLEM — E11.9 TYPE 2 DIABETES MELLITUS WITHOUT COMPLICATIONS: Chronic | Status: ACTIVE | Noted: 2023-08-22

## 2023-08-25 LAB
ALBUMIN SERPL ELPH-MCNC: 2.9 G/DL — LOW (ref 3.3–5.2)
ALP SERPL-CCNC: 116 U/L — SIGNIFICANT CHANGE UP (ref 40–120)
ALT FLD-CCNC: 20 U/L — SIGNIFICANT CHANGE UP
ANION GAP SERPL CALC-SCNC: 17 MMOL/L — SIGNIFICANT CHANGE UP (ref 5–17)
APTT BLD: 101.1 SEC — HIGH (ref 24.5–35.6)
APTT BLD: 25.9 SEC — SIGNIFICANT CHANGE UP (ref 24.5–35.6)
AST SERPL-CCNC: 39 U/L — SIGNIFICANT CHANGE UP
BASOPHILS # BLD AUTO: 0.01 K/UL — SIGNIFICANT CHANGE UP (ref 0–0.2)
BASOPHILS NFR BLD AUTO: 0.1 % — SIGNIFICANT CHANGE UP (ref 0–2)
BILIRUB SERPL-MCNC: 0.6 MG/DL — SIGNIFICANT CHANGE UP (ref 0.4–2)
BUN SERPL-MCNC: 12.9 MG/DL — SIGNIFICANT CHANGE UP (ref 8–20)
CALCIUM SERPL-MCNC: 8.3 MG/DL — LOW (ref 8.4–10.5)
CHLORIDE SERPL-SCNC: 92 MMOL/L — LOW (ref 96–108)
CO2 SERPL-SCNC: 19 MMOL/L — LOW (ref 22–29)
CREAT SERPL-MCNC: 0.85 MG/DL — SIGNIFICANT CHANGE UP (ref 0.5–1.3)
EGFR: 96 ML/MIN/1.73M2 — SIGNIFICANT CHANGE UP
EOSINOPHIL # BLD AUTO: 0 K/UL — SIGNIFICANT CHANGE UP (ref 0–0.5)
EOSINOPHIL NFR BLD AUTO: 0 % — SIGNIFICANT CHANGE UP (ref 0–6)
GLUCOSE BLDC GLUCOMTR-MCNC: 161 MG/DL — HIGH (ref 70–99)
GLUCOSE BLDC GLUCOMTR-MCNC: 231 MG/DL — HIGH (ref 70–99)
GLUCOSE SERPL-MCNC: 296 MG/DL — HIGH (ref 70–99)
HCT VFR BLD CALC: 33.5 % — LOW (ref 39–50)
HCT VFR BLD CALC: 35 % — LOW (ref 39–50)
HGB BLD-MCNC: 11.3 G/DL — LOW (ref 13–17)
HGB BLD-MCNC: 11.8 G/DL — LOW (ref 13–17)
IMM GRANULOCYTES NFR BLD AUTO: 0.4 % — SIGNIFICANT CHANGE UP (ref 0–0.9)
INR BLD: 0.98 RATIO — SIGNIFICANT CHANGE UP (ref 0.85–1.18)
LYMPHOCYTES # BLD AUTO: 0.7 K/UL — LOW (ref 1–3.3)
LYMPHOCYTES # BLD AUTO: 7 % — LOW (ref 13–44)
MCHC RBC-ENTMCNC: 29.1 PG — SIGNIFICANT CHANGE UP (ref 27–34)
MCHC RBC-ENTMCNC: 29.4 PG — SIGNIFICANT CHANGE UP (ref 27–34)
MCHC RBC-ENTMCNC: 33.7 GM/DL — SIGNIFICANT CHANGE UP (ref 32–36)
MCHC RBC-ENTMCNC: 33.7 GM/DL — SIGNIFICANT CHANGE UP (ref 32–36)
MCV RBC AUTO: 86.4 FL — SIGNIFICANT CHANGE UP (ref 80–100)
MCV RBC AUTO: 87 FL — SIGNIFICANT CHANGE UP (ref 80–100)
MONOCYTES # BLD AUTO: 0.45 K/UL — SIGNIFICANT CHANGE UP (ref 0–0.9)
MONOCYTES NFR BLD AUTO: 4.5 % — SIGNIFICANT CHANGE UP (ref 2–14)
NEUTROPHILS # BLD AUTO: 8.81 K/UL — HIGH (ref 1.8–7.4)
NEUTROPHILS NFR BLD AUTO: 88 % — HIGH (ref 43–77)
NT-PROBNP SERPL-SCNC: 993 PG/ML — HIGH (ref 0–300)
PLATELET # BLD AUTO: 254 K/UL — SIGNIFICANT CHANGE UP (ref 150–400)
PLATELET # BLD AUTO: 269 K/UL — SIGNIFICANT CHANGE UP (ref 150–400)
POTASSIUM SERPL-MCNC: 5.1 MMOL/L — SIGNIFICANT CHANGE UP (ref 3.5–5.3)
POTASSIUM SERPL-SCNC: 5.1 MMOL/L — SIGNIFICANT CHANGE UP (ref 3.5–5.3)
PROT SERPL-MCNC: 6.2 G/DL — LOW (ref 6.6–8.7)
PROTHROM AB SERPL-ACNC: 10.9 SEC — SIGNIFICANT CHANGE UP (ref 9.5–13)
RAPID RVP RESULT: SIGNIFICANT CHANGE UP
RBC # BLD: 3.85 M/UL — LOW (ref 4.2–5.8)
RBC # BLD: 4.05 M/UL — LOW (ref 4.2–5.8)
RBC # FLD: 12.9 % — SIGNIFICANT CHANGE UP (ref 10.3–14.5)
RBC # FLD: 13.1 % — SIGNIFICANT CHANGE UP (ref 10.3–14.5)
SARS-COV-2 RNA SPEC QL NAA+PROBE: SIGNIFICANT CHANGE UP
SODIUM SERPL-SCNC: 128 MMOL/L — LOW (ref 135–145)
TROPONIN T SERPL-MCNC: <0.01 NG/ML — SIGNIFICANT CHANGE UP (ref 0–0.06)
WBC # BLD: 10.01 K/UL — SIGNIFICANT CHANGE UP (ref 3.8–10.5)
WBC # BLD: 8.74 K/UL — SIGNIFICANT CHANGE UP (ref 3.8–10.5)
WBC # FLD AUTO: 10.01 K/UL — SIGNIFICANT CHANGE UP (ref 3.8–10.5)
WBC # FLD AUTO: 8.74 K/UL — SIGNIFICANT CHANGE UP (ref 3.8–10.5)

## 2023-08-25 PROCEDURE — 99291 CRITICAL CARE FIRST HOUR: CPT | Mod: GC

## 2023-08-25 PROCEDURE — 36014 PLACE CATHETER IN ARTERY: CPT | Mod: RT

## 2023-08-25 PROCEDURE — 99223 1ST HOSP IP/OBS HIGH 75: CPT

## 2023-08-25 PROCEDURE — 93010 ELECTROCARDIOGRAM REPORT: CPT

## 2023-08-25 PROCEDURE — 74177 CT ABD & PELVIS W/CONTRAST: CPT | Mod: 26,MA

## 2023-08-25 PROCEDURE — 93306 TTE W/DOPPLER COMPLETE: CPT | Mod: 26

## 2023-08-25 PROCEDURE — 36015 PLACE CATHETER IN ARTERY: CPT | Mod: 50,XS

## 2023-08-25 PROCEDURE — 70450 CT HEAD/BRAIN W/O DYE: CPT | Mod: 26,MA

## 2023-08-25 PROCEDURE — 37184 PRIM ART M-THRMBC 1ST VSL: CPT | Mod: 50

## 2023-08-25 PROCEDURE — 75743 ARTERY X-RAYS LUNGS: CPT | Mod: 26,XU

## 2023-08-25 PROCEDURE — 71275 CT ANGIOGRAPHY CHEST: CPT | Mod: 26,MA

## 2023-08-25 PROCEDURE — 93970 EXTREMITY STUDY: CPT | Mod: 26

## 2023-08-25 PROCEDURE — 99152 MOD SED SAME PHYS/QHP 5/>YRS: CPT

## 2023-08-25 PROCEDURE — 37185 PRIM ART M-THRMBC SBSQ VSL: CPT

## 2023-08-25 RX ORDER — HEPARIN SODIUM 5000 [USP'U]/ML
INJECTION INTRAVENOUS; SUBCUTANEOUS
Qty: 25000 | Refills: 0 | Status: DISCONTINUED | OUTPATIENT
Start: 2023-08-25 | End: 2023-08-26

## 2023-08-25 RX ORDER — SODIUM CHLORIDE 9 MG/ML
1000 INJECTION, SOLUTION INTRAVENOUS
Refills: 0 | Status: DISCONTINUED | OUTPATIENT
Start: 2023-08-25 | End: 2023-08-28

## 2023-08-25 RX ORDER — HEPARIN SODIUM 5000 [USP'U]/ML
6500 INJECTION INTRAVENOUS; SUBCUTANEOUS ONCE
Refills: 0 | Status: COMPLETED | OUTPATIENT
Start: 2023-08-25 | End: 2023-08-25

## 2023-08-25 RX ORDER — INSULIN LISPRO 100/ML
VIAL (ML) SUBCUTANEOUS
Refills: 0 | Status: DISCONTINUED | OUTPATIENT
Start: 2023-08-25 | End: 2023-08-28

## 2023-08-25 RX ORDER — GLUCAGON INJECTION, SOLUTION 0.5 MG/.1ML
1 INJECTION, SOLUTION SUBCUTANEOUS ONCE
Refills: 0 | Status: DISCONTINUED | OUTPATIENT
Start: 2023-08-25 | End: 2023-08-28

## 2023-08-25 RX ORDER — APIXABAN 2.5 MG/1
5 TABLET, FILM COATED ORAL
Refills: 0 | Status: CANCELLED | OUTPATIENT
Start: 2023-09-02 | End: 2023-08-28

## 2023-08-25 RX ORDER — INSULIN LISPRO 100/ML
VIAL (ML) SUBCUTANEOUS AT BEDTIME
Refills: 0 | Status: DISCONTINUED | OUTPATIENT
Start: 2023-08-25 | End: 2023-08-28

## 2023-08-25 RX ORDER — DEXTROSE 50 % IN WATER 50 %
25 SYRINGE (ML) INTRAVENOUS ONCE
Refills: 0 | Status: DISCONTINUED | OUTPATIENT
Start: 2023-08-25 | End: 2023-08-28

## 2023-08-25 RX ORDER — HEPARIN SODIUM 5000 [USP'U]/ML
6500 INJECTION INTRAVENOUS; SUBCUTANEOUS EVERY 6 HOURS
Refills: 0 | Status: DISCONTINUED | OUTPATIENT
Start: 2023-08-25 | End: 2023-08-26

## 2023-08-25 RX ORDER — INSULIN GLARGINE 100 [IU]/ML
10 INJECTION, SOLUTION SUBCUTANEOUS AT BEDTIME
Refills: 0 | Status: DISCONTINUED | OUTPATIENT
Start: 2023-08-25 | End: 2023-08-28

## 2023-08-25 RX ORDER — ACETAMINOPHEN 500 MG
650 TABLET ORAL ONCE
Refills: 0 | Status: COMPLETED | OUTPATIENT
Start: 2023-08-25 | End: 2023-08-25

## 2023-08-25 RX ORDER — DEXTROSE 50 % IN WATER 50 %
15 SYRINGE (ML) INTRAVENOUS ONCE
Refills: 0 | Status: DISCONTINUED | OUTPATIENT
Start: 2023-08-25 | End: 2023-08-28

## 2023-08-25 RX ORDER — DEXTROSE 50 % IN WATER 50 %
12.5 SYRINGE (ML) INTRAVENOUS ONCE
Refills: 0 | Status: DISCONTINUED | OUTPATIENT
Start: 2023-08-25 | End: 2023-08-28

## 2023-08-25 RX ORDER — HEPARIN SODIUM 5000 [USP'U]/ML
3000 INJECTION INTRAVENOUS; SUBCUTANEOUS EVERY 6 HOURS
Refills: 0 | Status: DISCONTINUED | OUTPATIENT
Start: 2023-08-25 | End: 2023-08-26

## 2023-08-25 RX ORDER — ENOXAPARIN SODIUM 100 MG/ML
40 INJECTION SUBCUTANEOUS ONCE
Refills: 0 | Status: DISCONTINUED | OUTPATIENT
Start: 2023-08-25 | End: 2023-08-25

## 2023-08-25 RX ORDER — ONDANSETRON 8 MG/1
4 TABLET, FILM COATED ORAL EVERY 6 HOURS
Refills: 0 | Status: DISCONTINUED | OUTPATIENT
Start: 2023-08-25 | End: 2023-08-25

## 2023-08-25 RX ORDER — SODIUM CHLORIDE 9 MG/ML
1 INJECTION INTRAMUSCULAR; INTRAVENOUS; SUBCUTANEOUS THREE TIMES A DAY
Refills: 0 | Status: DISCONTINUED | OUTPATIENT
Start: 2023-08-25 | End: 2023-08-28

## 2023-08-25 RX ORDER — SODIUM CHLORIDE 9 MG/ML
250 INJECTION INTRAMUSCULAR; INTRAVENOUS; SUBCUTANEOUS ONCE
Refills: 0 | Status: COMPLETED | OUTPATIENT
Start: 2023-08-25 | End: 2023-08-25

## 2023-08-25 RX ORDER — APIXABAN 2.5 MG/1
10 TABLET, FILM COATED ORAL
Refills: 0 | Status: DISCONTINUED | OUTPATIENT
Start: 2023-08-26 | End: 2023-08-28

## 2023-08-25 RX ADMIN — Medication 1: at 17:16

## 2023-08-25 RX ADMIN — SODIUM CHLORIDE 250 MILLILITER(S): 9 INJECTION INTRAMUSCULAR; INTRAVENOUS; SUBCUTANEOUS at 15:17

## 2023-08-25 RX ADMIN — HEPARIN SODIUM 6500 UNIT(S): 5000 INJECTION INTRAVENOUS; SUBCUTANEOUS at 07:47

## 2023-08-25 RX ADMIN — Medication 650 MILLIGRAM(S): at 04:09

## 2023-08-25 RX ADMIN — HEPARIN SODIUM 1400 UNIT(S)/HR: 5000 INJECTION INTRAVENOUS; SUBCUTANEOUS at 07:48

## 2023-08-25 RX ADMIN — HEPARIN SODIUM 1400 UNIT(S)/HR: 5000 INJECTION INTRAVENOUS; SUBCUTANEOUS at 19:05

## 2023-08-25 RX ADMIN — SODIUM CHLORIDE 1 GRAM(S): 9 INJECTION INTRAMUSCULAR; INTRAVENOUS; SUBCUTANEOUS at 21:31

## 2023-08-25 RX ADMIN — INSULIN GLARGINE 10 UNIT(S): 100 INJECTION, SOLUTION SUBCUTANEOUS at 21:30

## 2023-08-25 NOTE — CONSULT NOTE ADULT - NS ATTEND AMEND GEN_ALL_CORE FT
Patient was seen and examined at bedside and noted to be resting comfortably with no chest pain but notes 24 hrs of exertional acute shortness of breath; Tachycardic and Hypoxemic to 86 %   Risk factors  Ureter tumor s/p IR nephrostomy tube 8/1 with stent, now s/p ureter tumor resection yesterday  Trops neg   Serum prbnp: 993  RIETE score 2.8% intermediate risk for bleeding  sPESI score 2 High risk 8.9% risk of death in the “High” risk group (>0 points)  TTE: shows evidence of Stanford's sign with moderately enlarged severely reduced RV function   Lactate not done     1) Intermediate high risk PE, provoked In the setting of Ureter tumor (+ malignant cells; paracentesis and omental biopsy), recent surgical procedure   - patient is hemodynamically stable at rest and off supplemental oxygen was saturating 86-90% on RA, tachycardic 100 bpm with normal BP   - Trops negative x 1 can trend and pending Lactate level   - TTE done shows evidence of Stanford's sign and moderately dilated RV with severe RV function   - on Hep ggt  - CTA reviewed based on thrombus burden, and TTE showing evidence of Stanford's sign will defer TPA and recommend thrombectomy / INARI as a better option in light of recent surgical intervention  - recommend to obtain US duplex of the bilateral lower extremities; if there is evidence of proximal clots or unable to tolerate AC would recommend retrievable IVC filter   - recommend Urology eval   - will follow up post INARI/ mechanical thrombectomy     patient is stable and benefit from baing on Step down or Telemetry (4 tower)    Teresa Chun D.O. [facility]  Cardiology/Vascular Cardiology -[facility]   Telephone # [phone] Patient was seen and examined at bedside and noted to be resting comfortably with no chest pain but notes 24 hrs of exertional acute shortness of breath; Tachycardic and Hypoxemic to 86 %   Risk factors  Ureter tumor s/p IR nephrostomy tube 8/1 with stent, now s/p ureter tumor resection yesterday  Trops neg   Serum prbnp: 993  RIETE score 2.8% intermediate risk for bleeding  sPESI score 2 High risk 8.9% risk of death in the “High” risk group (>0 points)  TTE: shows evidence of Stanford's sign with moderately enlarged severely reduced RV function   Lactate not done     1) Intermediate high risk PE, provoked In the setting of Ureter tumor (+ malignant cells; paracentesis and omental biopsy), recent surgical procedure   - patient is hemodynamically stable at rest and off supplemental oxygen was saturating 86-90% on RA, tachycardic 100 bpm with normal BP   - Trops negative x 1 can trend and pending Lactate level   - TTE done shows evidence of Stanford's sign and moderately dilated RV with severe RV function   - on Hep ggt  - CTA reviewed based on thrombus burden, and TTE showing evidence of Stanford's sign will defer TPA and recommend thrombectomy / INARI as a better option in light of recent surgical intervention  - recommend to obtain US duplex of the bilateral lower extremities; if there is evidence of proximal clots or unable to tolerate AC would recommend retrievable IVC filter   - recommend Urology eval; as patient will be on AC possibly long term while patient has active cancer   - will follow up post INARI/ mechanical thrombectomy     patient is stable and benefit from baing on Step down or Telemetry (4 tower)    Teresa Chun D.O. [facility]  Cardiology/Vascular Cardiology -[facility]   Telephone # [phone]

## 2023-08-25 NOTE — ED ADULT TRIAGE NOTE - CHIEF COMPLAINT QUOTE
pt BIBEMS s/p discharge from [facility] after having urethral stent placed. pt arrived with faria and drainage bag in place. pt endorses being home after being discharged and become SOB. EMS states upon arrival pt was 85% on RA placed on 6L nasal. ekg and glucose obtained prior to triage.

## 2023-08-25 NOTE — ED PROVIDER NOTE - NS ED ROS FT
Gen: No fever, no change in activity level  ENT: No congestion, no rhinorrhea  Resp: No cough, (+)trouble breathing  Cardiovascular: No chest pain, no palpitation  Gastrointestinal: No nausea, no vomiting, no diarrhea  :  No change in urine output; no dysuria, no hematuria  MS: No joint or muscle pain  Neuro: No headache; no abnormal movements  Remainder negative, except as per the HPI

## 2023-08-25 NOTE — CONSULT NOTE ADULT - SUBJECTIVE AND OBJECTIVE BOX
Called to see Azeri speaking patient( speak some english; sister present at bedside speaks english )  being admitted via ER for onset of SOB with reported saddle embolism.  Patient known to us POD# 1 s/p urological procedure 8/24/23.  s/p: ureteroendoscopy with excisional biopsy ureteral mass and retrograde pyelogram with placement left ureteral stent .  Patient did well was subsequently discharged later that day and reported developed SOB and returned to ER today for evaluation:  sister at bedside.       67  year old male with PMHx HTN, ureteral lesion with hydronephrosis s/p L nephrostomy tube on 8/2/23, recently discharged from [facility] about 1 hour PTA s/p 20Fr faria and ureteral stent, presenting for shortness of breath upon discharge. Patient states he felt well after the procedure, then en route home he developed shortness of breath that progressively worsened. At home, his family members found him to be dyspneic and pale, so called EMS who found his O2 to be 85% on RA, improved on 6L via NC. At time of evaluation, patient reports feeling significantly improved. Denies having any chest pain, abdominal pain, headache. States his only symptoms were shortness of breath. Patient is currently being worked up for malignancy.    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

## 2023-08-25 NOTE — CONSULT NOTE ADULT - PROBLEM SELECTOR RECOMMENDATION 9
.  Heparin gtt started in ED  CT head pending  US b/L LE r/o DVT pending  ECHO severe RV dysfunction, +mcconnels sign   plan for INARI procedure today  procedure explained by Dr. Chun via  pt agreeable  NPO for now   Urology consultuation s/p resection 8/24, now on blood thinner (Colt) .  Heparin gtt started in ED  CT head pending  US b/L LE r/o DVT pending  ECHO severe RV dysfunction, +mcconnels sign   plan for INARI procedure today  procedure explained by Dr. Chun via  pt agreeable  NPO for now   Urology consultation s/p resection 8/24, now on blood thinner (Colt)

## 2023-08-25 NOTE — ED ADULT NURSE REASSESSMENT NOTE - NS ED NURSE REASSESS COMMENT FT1
assumed care of pt at 0720. report received from GAURANG Hernández. charting as noted. rr even and unlabored. continued cardiac monitoring in place. anox4. iv intact. awaiting heprin and admission. denies chest pain or sob at this time, on 4 L NC. pt educated on plan of care, pt able to successfully teach back plan of care to RN, RN will continue to reeducate pt during hospital stay.

## 2023-08-25 NOTE — CONSULT NOTE ADULT - ASSESSMENT
Heparin gtt  CT head  US b/L LE r/o DVT  ECHO ordered, stat  possible INARI procedure today procedure explained by Dr. Chun via  pt agreeable to procedure  NPO for now   Urology consultuation s/p resection 8/24, now on blood thinner (Colt)   68 y/o M with PMH HTN, DM, Ureter tumor s/p IR nephrostomy tube 8/1 with stent, now s/p ureter tumor resection yesterday, Presents to ED today with c/o difficulty breathing, hypoxic on room air. CTA chest with large saddle PE.

## 2023-08-25 NOTE — ED PROVIDER NOTE - CLINICAL SUMMARY MEDICAL DECISION MAKING FREE TEXT BOX
67 year old male recently d/c from [facility] 1 hour PTA s/p ureteral stent presenting for evaluation of shortness of breath. Patient placed on cardiac monitor and . Labs, CT ordered. Will reassess.

## 2023-08-25 NOTE — ED ADULT NURSE NOTE - NSFALLUNIVINTERV_ED_ALL_ED
Bed/Stretcher in lowest position, wheels locked, appropriate side rails in place/Call bell, personal items and telephone in reach/Instruct patient to call for assistance before getting out of bed/chair/stretcher/Non-slip footwear applied when patient is off stretcher/[place] to call system/Physically safe environment - no spills, clutter or unnecessary equipment/Purposeful proactive rounding/Room/bathroom lighting operational, light cord in reach

## 2023-08-25 NOTE — CONSULT NOTE ADULT - SUBJECTIVE AND OBJECTIVE BOX
67  year old male with PMHx HTN, ureteral lesion with hydronephrosis s/p L nephrostomy tube on 8/2/23, recently discharged from [facility] about 1 hour PTA s/p 20Fr faria and ureteral stent, presenting for shortness of breath upon discharge. Patient states he felt well after the procedure, then en route home he developed shortness of breath that progressively worsened. At home, his family members found him to be dyspneic and pale, so called EMS who found his O2 to be 85% on RA, improved on 6L via NC. At time of evaluation, patient reports feeling significantly improved. Denies having any chest pain, abdominal pain, headache. States his only symptoms were shortness of breath. Patient is currently being worked up for [facility]

## 2023-08-25 NOTE — CONSULT NOTE ADULT - SUBJECTIVE AND OBJECTIVE BOX
[facility]                                              CARDIOLOGY AT [place] [address]                                             Telephone: [phone]. Fax:[phone]                                                       CARDIOLOGY CONSULTATION NOTE                                                                                             History obtained by: Patient and medical record  Community Cardiologist: none   obtained: Yes [ x ] Tj  Reason for Consultation: PE      Chief complaint:    Patient is a 67y old  Male who presents with a chief complaint of shortness of breath    HPI: 66 y/o M with PMH HTN, DM, Ureter tumor s/p ...now s/p ureter tumor resection,       CARDIAC TESTING   ECHO:    STRESS:    CATH:     ELECTROPHYSIOLOGY:         PAST MEDICAL HISTORY  HTN (hypertension)  Diabetes        PAST SURGICAL HISTORY  History of cholecystectomy  H/O arthroscopy of right knee        SOCIAL HISTORY:  Denies smoking/alcohol/drugs        FAMILY HISTORY:  FH: brain cancer (Sibling)        HOME MEDICATIONS:  amLODIPine 5 mg oral tablet: 1 orally once a day (24 Aug 2023 15:52)  olmesartan 40 mg oral tablet: 1 tab(s) orally once a day (24 Aug 2023 15:52)      CURRENT CARDIAC MEDICATIONS:      CURRENT OTHER MEDICATIONS:  heparin   Injectable 6500 Unit(s) IV Push every 6 hours PRN For aPTT less than 40  heparin   Injectable 3000 Unit(s) IV Push every 6 hours PRN For aPTT between 40 - 57  heparin  Infusion.  Unit(s)/Hr (14 mL/Hr) IV Continuous <Continuous>      ALLERGIES:   No Known Allergies      REVIEW OF SYMPTOMS:   CONSTITUTIONAL: No fever, no chills, no weight loss, no weight gain, no fatigue   ENMT:  No vertigo; No sinus or throat pain  NECK: No pain or stiffness  CARDIOVASCULAR: No chest pain, + dyspnea, no syncope/presyncope, no palpitations, no dizziness, no Orthopnea, no Paroxsymal nocturnal dyspnea  RESPIRATORY: no Shortness of breath, no cough, no wheezing  : No dysuria, no hematuria   GI: No dark color stool, no nausea, no diarrhea, no constipation, no abdominal pain   NEURO: No headache, no slurred speech   MUSCULOSKELETAL: No joint pain or swelling; No muscle, back, or extremity pain  PSYCH: No agitation, no anxiety.    ALL OTHER REVIEW OF SYSTEMS ARE NEGATIVE.    VITAL SIGNS:  T(C): 36.2 (08-25-23 @ 07:21), Max: 36.7 (08-24-23 @ 20:30)  T(F): 97.2 (08-25-23 @ 07:21), Max: 98.1 (08-24-23 @ 23:30)  HR: 94 (08-25-23 @ 07:21) (82 - 132)  BP: 129/75 (08-25-23 @ 07:21) (112/62 - 142/86)  RR: 22 (08-25-23 @ 07:21) (17 - 26)  SpO2: 96% (08-25-23 @ 07:21) (90% - 100%)         PHYSICAL EXAM:  Constitutional: Comfortable . No acute distress.   HEENT: Atraumatic and normocephalic , neck is supple . no JVD. No carotid bruit.  CNS: A&Ox3. No focal deficits.   Respiratory: CTAB, unlabored   Cardiovascular: RRR normal s1 s2. No murmur. No rubs or gallop.  Gastrointestinal: Soft, non-tender. +Bowel sounds.   Extremities: 2+ Peripheral Pulses, No clubbing, cyanosis, or edema  Psychiatric: Calm . no agitation.   Skin: Warm and dry, no ulcers on extremities     LABS:  ( 25 Aug 2023 03:15 )  Troponin T  <0.01,  CPK  X    , CKMB  X    , BNP X                                  11.8   10.01 )-----------( 254      ( 25 Aug 2023 04:05 )             35.0     08-25    128<L>  |  92<L>  |  12.9  ----------------------------<  296<H>  5.1   |  19.0<L>  |  0.85    Ca    8.3<L>      25 Aug 2023 03:15    TPro  6.2<L>  /  Alb  2.9<L>  /  TBili  0.6  /  DBili  x   /  AST  39  /  ALT  20  /  AlkPhos  116  08-25    PT/INR - ( 25 Aug 2023 04:05 )   PT: 10.9 sec;   INR: 0.98 ratio         PTT - ( 25 Aug 2023 04:05 )  PTT:25.9 sec  Urinalysis Basic - ( 25 Aug 2023 03:15 )    Color: x / Appearance: x / SG: x / pH: x  Gluc: 296 mg/dL / Ketone: x  / Bili: x / Urobili: x   Blood: x / Protein: x / Nitrite: x   Leuk Esterase: x / RBC: x / WBC x   Sq Epi: x / Non Sq Epi: x / Bacteria: x        INTERPRETATION OF TELEMETRY:       ECG:   Prior ECG: Yes [  ] No [  ]      RADIOLOGY & ADDITIONAL STUDIES:    X-ray:    CT scan:   MRI:   US:                                                [facility]                                              CARDIOLOGY AT [address], [place] [address]                                             Telephone: [phone]. Fax:[phone]                                                       CARDIOLOGY CONSULTATION NOTE                                                                                             History obtained by: Patient and medical record  Community Cardiologist: none   obtained: Yes [ x ] Tj  Reason for Consultation: PE      Chief complaint:    Patient is a 67y old  Male who presents with a chief complaint of shortness of breath    HPI: 68 y/o M with PMH HTN, DM, Ureter tumor s/p IR nephrostomy tube 8/1 with stent, now s/p ureter tumor resection yesterday, Presents to ED today with c/o difficulty breathing, hypoxic on room air. CTA chest with large saddle PE.       CARDIAC TESTING   ECHO:  < from: TTE Echo Complete w/ Contrast w/ Doppler (08.25.23 @ 09:00) >  Summary:   1. Left ventricular ejection fraction, by visual estimation, is>75%.   2. Hyperdynamic global left ventricular systolic function.   3. Spectral Doppler shows impaired relaxation pattern of left ventricular myocardial filling (Grade I diastolic dysfunction).   4. Right ventricular volume overload.   5. Moderately enlarged right ventricle and severely reduced RV systolic function; there appears to be McConnells sign, TAPSE 1.45 cm.   6. The left atrium is normal in size.   7. Trace mitral valve regurgitation.   8. Mild thickening of the anterior and posterior mitral valve leaflets.   9. Mild tricuspid regurgitation.  10. Mild pulmonic valve regurgitation.  11. Moderate ascites.  12. There is no evidence of pericardial effusion.  13. Endocardial visualization was enhanced with intravenous echo contrast.  14. There are no prior studies on this patient for comparison purposes.    Teresa Chun MD Electronically signed on 8/25/2023 at 10:39:26 AM    < end of copied text >          PAST MEDICAL HISTORY  HTN (hypertension)  Diabetes        PAST SURGICAL HISTORY  History of cholecystectomy  H/O arthroscopy of right knee        SOCIAL HISTORY:  Denies smoking/alcohol/drugs        FAMILY HISTORY:  FH: brain cancer (Sibling)        HOME MEDICATIONS:  amLODIPine 5 mg oral tablet: 1 orally once a day (24 Aug 2023 15:52)  olmesartan 40 mg oral tablet: 1 tab(s) orally once a day (24 Aug 2023 15:52)      CURRENT CARDIAC MEDICATIONS:      CURRENT OTHER MEDICATIONS:  heparin   Injectable 6500 Unit(s) IV Push every 6 hours PRN For aPTT less than 40  heparin   Injectable 3000 Unit(s) IV Push every 6 hours PRN For aPTT between 40 - 57  heparin  Infusion.  Unit(s)/Hr (14 mL/Hr) IV Continuous <Continuous>      ALLERGIES:   No Known Allergies      REVIEW OF SYMPTOMS:   CONSTITUTIONAL: No fever, no chills, no weight loss, no weight gain, no fatigue   ENMT:  No vertigo; No sinus or throat pain  NECK: No pain or stiffness  CARDIOVASCULAR: No chest pain, + dyspnea, no syncope/presyncope, no palpitations, no dizziness, no Orthopnea, no Paroxsymal nocturnal dyspnea  RESPIRATORY: no Shortness of breath, no cough, no wheezing  : No dysuria, no hematuria   GI: No dark color stool, no nausea, no diarrhea, no constipation, no abdominal pain   NEURO: No headache, no slurred speech   MUSCULOSKELETAL: No joint pain or swelling; No muscle, back, or extremity pain  PSYCH: No agitation, no anxiety.    ALL OTHER REVIEW OF SYSTEMS ARE NEGATIVE.    VITAL SIGNS:  T(C): 36.2 (08-25-23 @ 07:21), Max: 36.7 (08-24-23 @ 20:30)  T(F): 97.2 (08-25-23 @ 07:21), Max: 98.1 (08-24-23 @ 23:30)  HR: 94 (08-25-23 @ 07:21) (82 - 132)  BP: 129/75 (08-25-23 @ 07:21) (112/62 - 142/86)  RR: 22 (08-25-23 @ 07:21) (17 - 26)  SpO2: 96% (08-25-23 @ 07:21) (90% - 100%)         PHYSICAL EXAM:  Constitutional: Comfortable . No acute distress.   HEENT: Atraumatic and normocephalic , neck is supple . no JVD. No carotid bruit.  CNS: A&Ox3. No focal deficits.   Respiratory: CTAB, unlabored   Cardiovascular: RRR normal s1 s2. No murmur. No rubs or gallop.  Gastrointestinal: Soft, non-tender. +Bowel sounds.   Extremities: 2+ Peripheral Pulses, No clubbing, cyanosis, or edema  Psychiatric: Calm . no agitation.   Skin: Warm and dry, no ulcers on extremities     LABS:  ( 25 Aug 2023 03:15 )  Troponin T  <0.01,  CPK  X    , CKMB  X    , BNP X                                  11.8   10.01 )-----------( 254      ( 25 Aug 2023 04:05 )             35.0     08-25    128<L>  |  92<L>  |  12.9  ----------------------------<  296<H>  5.1   |  19.0<L>  |  0.85    Ca    8.3<L>      25 Aug 2023 03:15    TPro  6.2<L>  /  Alb  2.9<L>  /  TBili  0.6  /  DBili  x   /  AST  39  /  ALT  20  /  AlkPhos  116  08-25    PT/INR - ( 25 Aug 2023 04:05 )   PT: 10.9 sec;   INR: 0.98 ratio         PTT - ( 25 Aug 2023 04:05 )  PTT:25.9 sec  Urinalysis Basic - ( 25 Aug 2023 03:15 )    Color: x / Appearance: x / SG: x / pH: x  Gluc: 296 mg/dL / Ketone: x  / Bili: x / Urobili: x   Blood: x / Protein: x / Nitrite: x   Leuk Esterase: x / RBC: x / WBC x   Sq Epi: x / Non Sq Epi: x / Bacteria: x        INTERPRETATION OF TELEMETRY:       ECG:   Prior ECG: Yes [  ] No [  ]      RADIOLOGY & ADDITIONAL STUDIES:    X-ray:    CT scan:   MRI:   US:

## 2023-08-25 NOTE — ED ADULT NURSE REASSESSMENT NOTE - NS ED NURSE REASSESS COMMENT FT1
icu at bedside for eval. continued cardiac monitoring in place. rr even and unlabored. awaiting admission. denies pain at this time.

## 2023-08-25 NOTE — PROGRESS NOTE ADULT - SUBJECTIVE AND OBJECTIVE BOX
Department of Cardiology                                                                  [facility]/[place] [address] [address] [facility]                                                            Telephone: [phone]. Fax:[phone]                                                                      INTERVENTIONAL CARDIOLOGY NOTE     Subjective: 67y Male admitted for pulmonary embolism, S/P mechanical thrombectomy for large amount of thrombus.     HPI: 66 y/o male with PMHx of HTN, ureteral mass with hydronephrosis s/p left nephrostomy tube on 8/2/23, and recent discharge from [facility] 1day prior to arrival s/p 20 Fr faira and ureteral stent, came in for acute onset of shortness of breath. Of note, Patient reports that in addition to being hospitalized yesterday for his ureteral stent placement he had recently been hospitalized 2 weeks ago here at [facility] for malignancy workup and management of hydronephrosis that required nephrostomy tube placement. Patient had also traveled to [facility] prior to hospitalization 2 weeks ago were he had a paracentesis done for ascites and was informed there was concern for cancer.Patient stated he had just arrived at home when all of a sudden he felt like he couldn't breath or couldn't get enough air into his lungs. Patient also stated his heart felt like it was pounding in his chest. In ED today, patient was initially tachycardic to 103 with a bp of 148/100 and SpO2 of 98% on 3.5L NC. He had a CT angio Chest performed which showed a massive PE. Acute DVT RLE, now present to cath lab holding area for mechanical thrombectomy of pulmonary emboli.           PAST MEDICAL & SURGICAL HISTORY:  HTN (hypertension)  Diabetes  History of cholecystectomy  H/O arthroscopy of right knee      Home Medications:  amLODIPine 5 mg oral tablet: 1 orally once a day (25 Aug 2023 08:59)  olmesartan 40 mg oral tablet: 1 tab(s) orally once a day (25 Aug 2023 08:59)      MEDICATIONS  (STANDING):  heparin  Infusion.  Unit(s)/Hr (14 mL/Hr) IV Continuous <Continuous>    MEDICATIONS  (PRN):  heparin   Injectable 6500 Unit(s) IV Push every 6 hours PRN For aPTT less than 40  heparin   Injectable 3000 Unit(s) IV Push every 6 hours PRN For aPTT between 40 - 57      ROS:   Respiratory: SOB improved   CV: No chest pain, no palitations.  Neuro: No dizziness    Objective:   T(C): 36.8 (08-25-23 @ 13:04), Max: 36.9 (08-25-23 @ 08:34)  HR: 101 (08-25-23 @ 13:57) (82 - 132)  BP: 151/89 (08-25-23 @ 13:57) (112/62 - 151/89)  RR: 15 (08-25-23 @ 13:57) (15 - 26)  SpO2: 97% (08-25-23 @ 13:57) (90% - 100%)    General: Awake, alert, speech clear, no acute ditress.  Chest: CTA, RRR, no murmur.  Right Groin: soft, no bleeding, no hematoma.  Extremity: No edema, + distal pulses.                          11.8   10.01 )-----------( 254      ( 25 Aug 2023 04:05 )             35.0     08-25    128<L>  |  92<L>  |  12.9  ----------------------------<  296<H>  5.1   |  19.0<L>  |  0.85    Ca    8.3<L>      25 Aug 2023 03:15    TPro  6.2<L>  /  Alb  2.9<L>  /  TBili  0.6  /  DBili  x   /  AST  39  /  ALT  20  /  AlkPhos  116  08-25    PT/INR - ( 25 Aug 2023 04:05 )   PT: 10.9 sec;   INR: 0.98 ratio         PTT - ( 25 Aug 2023 04:05 )  PTT:25.9 sec

## 2023-08-25 NOTE — CONSULT NOTE ADULT - SUBJECTIVE AND OBJECTIVE BOX
Patient is a 67y old  Male who presents with a chief complaint of SOB    BRIEF HOSPITAL COURSE:     68 y/o male with PMHx of HTN, ureteral lesion with hydronephrosis s/p left nephrostomy tube on 8/2/23, and recent discharge from [facility] 1 hour prior to arrival s/p 20Fr faria and ureteral stent, came in for acute onset of shortness of breath. Patient stated he had just arrived at home when all of a sudden he felt like he couldn't breath or couldn't get enough air into his lungs. Patient also stated his heart felt like it was pounding in his chest. In ED today, patient was initially tachycardic to 103 with a bp of 148/100 and SpO2 of 98% on 3.5L NC. He had a CT angio Chest performed which showed a massive PE. MICU was consulted for possible obstructive shock in the setting of a massive PE.     Of note,  Patient reports that in addition to being hospitalized yesterday for his ureteral stent placement he had recently been hospitalized 2 weeks ago here at [facility] for malignancy workup and management of hydronephrosis that required nephrostomy tube placement. Patient had also traveled to [facility] prior to hospitalization 2 weeks ago were he had a paracentesis done for ascites and was informed there was concern for cancer.    At the time of evaluation, patient was hemodynamically stable and was comfortably laying in his bed with no complaints of SOB, chest pain, lightheadedness, dizziness, nausea or vomiting. There was some blood noted in his faria, but this is suspected to be due to his stent procedure from yesterday.       Events last 24 hours: Patient has been started on Heparin drip to prevent formation of new clots. Vascular has also seen the patient and plan for INRI procedure today. Awaiting results of Ultrasound bilateral lower extremities and CT head.    PAST MEDICAL & SURGICAL HISTORY:  HTN (hypertension)  Diabetes  History of cholecystectomy  H/O arthroscopy of right knee    SOCIAL HISTORY: Denies alcohol, tobacco, or recreational drug use.         Review of Systems:  CONSTITUTIONAL: No fever, chills, or fatigue  EYES: No visual disturbances  ENMT:  No difficulty hearing  NECK: No pain  RESPIRATORY: No cough. No current shortness of breath  CARDIOVASCULAR: No chest pain, palpitations, or leg swelling  GASTROINTESTINAL: No abdominal pain. No nausea, vomiting, diarrhea, or constipation. No hematemesis, melena or hematochezia  GENITOURINARY: Hematuria since yesterday after undergoing stent placement. No dysuria, frequency, or incontinence  NEUROLOGICAL: No headaches, loss of strength, numbness, or tremors  SKIN: No rashes  MUSCULOSKELETAL: No back or extremity pain. No calf pain  PSYCHIATRIC: No depression, anxiety, or difficulty sleeping        Medications:    heparin   Injectable 6500 Unit(s) IV Push every 6 hours PRN  heparin   Injectable 3000 Unit(s) IV Push every 6 hours PRN  heparin  Infusion.  Unit(s)/Hr IV Continuous <Continuous>      ICU Vital Signs Last 24 Hrs  T(C): 36.9 (25 Aug 2023 08:34), Max: 36.9 (25 Aug 2023 08:34)  T(F): 98.5 (25 Aug 2023 08:34), Max: 98.5 (25 Aug 2023 08:34)  HR: 95 (25 Aug 2023 09:45) (82 - 132)  BP: 134/92 (25 Aug 2023 09:45) (112/62 - 142/86)  BP(mean): --  ABP: --  ABP(mean): --  RR: 20 (25 Aug 2023 09:45) (17 - 26)  SpO2: 96% (25 Aug 2023 09:45) (90% - 100%)    O2 Parameters below as of 25 Aug 2023 09:45  Patient On (Oxygen Delivery Method): nasal cannula  O2 Flow (L/min): 4        I&O's Detail        LABS:                        11.8   10.01 )-----------( 254      ( 25 Aug 2023 04:05 )             35.0     08-25    128<L>  |  92<L>  |  12.9  ----------------------------<  296<H>  5.1   |  19.0<L>  |  0.85    Ca    8.3<L>      25 Aug 2023 03:15    TPro  6.2<L>  /  Alb  2.9<L>  /  TBili  0.6  /  DBili  x   /  AST  39  /  ALT  20  /  AlkPhos  116  08-25      CARDIAC MARKERS ( 25 Aug 2023 03:15 )  x     / <0.01 ng/mL / x     / x     / x          CAPILLARY BLOOD GLUCOSE      POCT Blood Glucose.: 318 mg/dL (25 Aug 2023 02:00)    PT/INR - ( 25 Aug 2023 04:05 )   PT: 10.9 sec;   INR: 0.98 ratio         PTT - ( 25 Aug 2023 04:05 )  PTT:25.9 sec  Urinalysis Basic - ( 25 Aug 2023 03:15 )    Color: x / Appearance: x / SG: x / pH: x  Gluc: 296 mg/dL / Ketone: x  / Bili: x / Urobili: x   Blood: x / Protein: x / Nitrite: x   Leuk Esterase: x / RBC: x / WBC x   Sq Epi: x / Non Sq Epi: x / Bacteria: x      CULTURES:  Rapid RVP Result: NotDetec (08-25 @ 03:15)  Culture Results:   <10,000 CFU/mL Normal Urogenital Elisa (08-22 @ 08:37)            Physical Examination:    General: No acute distress.      HEENT: Pupils equal, reactive to light.  Symmetric.    PULM: Clear to auscultation bilaterally, no significant sputum production    CVS: Regular rate and rhythm, no murmurs, rubs, or gallops    ABD: Firm, distended, nontender, normoactive bowel sounds    EXT: No edema, nontender    SKIN: Warm and well perfused, no rashes noted.    NEURO: Alert, oriented, interactive, nonfocal        RADIOLOGY:    CT Angio Chest PE Protocol w/ IV contrast (08.25.23 @ 06:41) -   IMPRESSION:  Extensive pulmonary thromboembolism including saddle embolus. No right heart strain. Interval increase of ascites with redemonstration of peritoneal carcinomatosis. Interval decrease of left hydroureteronephrosis with nephrostomy tube in the collecting system.    CT Abdomen and Pelvis w/ IV Contrast (08.25.23 @ 06:41)-    IMPRESSION:  Extensive pulmonary thromboembolism including saddle embolus. No right heart strain. Interval increase of ascites with redemonstration of peritoneal carcinomatosis. Interval decrease of left hydroureteronephrosis with nephrostomy tube in the collecting system.            INVASIVE LINES:  INDWELLING FARIA:  VTE PROPHYLAXIS:  CAM ICU:  CODE STATUS:        CRITICAL CARE TIME SPENT: *** minutes spent performing frequent bedside reassessments and augmenting plan of care to address problems of acute critical illness that pose high probability of life threatening deterioration and/or end organ damage/dysfunction and discussing goals of care, non-inclusive of time spent on procedures performed. Patient is a 67y old  Male who presents with a chief complaint of SOB    BRIEF HOSPITAL COURSE:     68 y/o male with PMHx of HTN, ureteral mass with hydronephrosis s/p left nephrostomy tube on 8/2/23, and recent discharge from [facility] 1day prior to arrival s/p 20 Fr faria and ureteral stent, came in for acute onset of shortness of breath. Patient stated he had just arrived at home when all of a sudden he felt like he couldn't breath or couldn't get enough air into his lungs. Patient also stated his heart felt like it was pounding in his chest. In ED today, patient was initially tachycardic to 103 with a bp of 148/100 and SpO2 of 98% on 3.5L NC. He had a CT angio Chest performed which showed a massive PE. MICU was consulted for possible obstructive shock in the setting of a massive PE.     Of note,  Patient reports that in addition to being hospitalized yesterday for his ureteral stent placement he had recently been hospitalized 2 weeks ago here at [facility] for malignancy workup and management of hydronephrosis that required nephrostomy tube placement. Patient had also traveled to [facility] prior to hospitalization 2 weeks ago were he had a paracentesis done for ascites and was informed there was concern for cancer.    At the time of evaluation, patient was hemodynamically stable and was comfortably laying in his bed with no complaints of SOB, chest pain, lightheadedness, dizziness, nausea or vomiting. There was some blood noted in his faria, but this is suspected to be due to his stent procedure from yesterday.       Events last 24 hours: Patient has been started on Heparin drip to prevent formation of new clots. Vascular has also seen the patient and plan for INRI procedure today. Awaiting results of Ultrasound bilateral lower extremities and CT head.    PAST MEDICAL & SURGICAL HISTORY:  HTN (hypertension)  Diabetes  History of cholecystectomy  H/O arthroscopy of right knee    SOCIAL HISTORY: Denies alcohol, tobacco, or recreational drug use.         Review of Systems:  CONSTITUTIONAL: No fever, chills, or fatigue  EYES: No visual disturbances  ENMT:  No difficulty hearing  NECK: No pain  RESPIRATORY: No cough. No current shortness of breath  CARDIOVASCULAR: No chest pain, palpitations, or leg swelling  GASTROINTESTINAL: No abdominal pain. No nausea, vomiting, diarrhea, or constipation. No hematemesis, melena or hematochezia  GENITOURINARY: Hematuria since yesterday after undergoing stent placement. No dysuria, frequency, or incontinence  NEUROLOGICAL: No headaches, loss of strength, numbness, or tremors  SKIN: No rashes  MUSCULOSKELETAL: No back or extremity pain. No calf pain  PSYCHIATRIC: No depression, anxiety, or difficulty sleeping        Medications:    heparin   Injectable 6500 Unit(s) IV Push every 6 hours PRN  heparin   Injectable 3000 Unit(s) IV Push every 6 hours PRN  heparin  Infusion.  Unit(s)/Hr IV Continuous <Continuous>      ICU Vital Signs Last 24 Hrs  T(C): 36.9 (25 Aug 2023 08:34), Max: 36.9 (25 Aug 2023 08:34)  T(F): 98.5 (25 Aug 2023 08:34), Max: 98.5 (25 Aug 2023 08:34)  HR: 95 (25 Aug 2023 09:45) (82 - 132)  BP: 134/92 (25 Aug 2023 09:45) (112/62 - 142/86)  BP(mean): --  ABP: --  ABP(mean): --  RR: 20 (25 Aug 2023 09:45) (17 - 26)  SpO2: 96% (25 Aug 2023 09:45) (90% - 100%)    O2 Parameters below as of 25 Aug 2023 09:45  Patient On (Oxygen Delivery Method): nasal cannula  O2 Flow (L/min): 4        I&O's Detail        LABS:                        11.8   10.01 )-----------( 254      ( 25 Aug 2023 04:05 )             35.0     08-25    128<L>  |  92<L>  |  12.9  ----------------------------<  296<H>  5.1   |  19.0<L>  |  0.85    Ca    8.3<L>      25 Aug 2023 03:15    TPro  6.2<L>  /  Alb  2.9<L>  /  TBili  0.6  /  DBili  x   /  AST  39  /  ALT  20  /  AlkPhos  116  08-25      CARDIAC MARKERS ( 25 Aug 2023 03:15 )  x     / <0.01 ng/mL / x     / x     / x          CAPILLARY BLOOD GLUCOSE      POCT Blood Glucose.: 318 mg/dL (25 Aug 2023 02:00)    PT/INR - ( 25 Aug 2023 04:05 )   PT: 10.9 sec;   INR: 0.98 ratio         PTT - ( 25 Aug 2023 04:05 )  PTT:25.9 sec  Urinalysis Basic - ( 25 Aug 2023 03:15 )    Color: x / Appearance: x / SG: x / pH: x  Gluc: 296 mg/dL / Ketone: x  / Bili: x / Urobili: x   Blood: x / Protein: x / Nitrite: x   Leuk Esterase: x / RBC: x / WBC x   Sq Epi: x / Non Sq Epi: x / Bacteria: x      CULTURES:  Rapid RVP Result: NotDetec (08-25 @ 03:15)  Culture Results:   <10,000 CFU/mL Normal Urogenital Elisa (08-22 @ 08:37)            Physical Examination:    General: No acute distress.      HEENT: Pupils equal, reactive to light.  Symmetric.    PULM: Clear to auscultation bilaterally, no significant sputum production    CVS: Regular rate and rhythm, no murmurs, rubs, or gallops    ABD: Firm, distended, nontender, normoactive bowel sounds    EXT: No edema, nontender    SKIN: Warm and well perfused, no rashes noted.    NEURO: Alert, oriented, interactive, nonfocal        RADIOLOGY:    CT Angio Chest PE Protocol w/ IV contrast (08.25.23 @ 06:41) -   IMPRESSION:  Extensive pulmonary thromboembolism including saddle embolus. No right heart strain. Interval increase of ascites with redemonstration of peritoneal carcinomatosis. Interval decrease of left hydroureteronephrosis with nephrostomy tube in the collecting system.    CT Abdomen and Pelvis w/ IV Contrast (08.25.23 @ 06:41)-    IMPRESSION:  Extensive pulmonary thromboembolism including saddle embolus. No right heart strain. Interval increase of ascites with redemonstration of peritoneal carcinomatosis. Interval decrease of left hydroureteronephrosis with nephrostomy tube in the collecting system.            INVASIVE LINES:  INDWELLING FARIA:  VTE PROPHYLAXIS:  CAM ICU:  CODE STATUS:        CRITICAL CARE TIME SPENT: *** minutes spent performing frequent bedside reassessments and augmenting plan of care to address problems of acute critical illness that pose high probability of life threatening deterioration and/or end organ damage/dysfunction and discussing goals of care, non-inclusive of time spent on procedures performed.

## 2023-08-25 NOTE — ED PROVIDER NOTE - OBJECTIVE STATEMENT
67  year old male with PMHx HTN, ureteral lesion with hydronephrosis s/p L nephrostomy tube on 8/2/23, recently discharged from [facility] about 1 hour PTA s/p 20Fr faria and ureteral stent, presenting for shortness of breath upon discharge. Patient states he felt well after the procedure, then en route home he develop shortness of breath that progressively worsened. At home, his family members found him to be dyspneic and pale, so called EMS who found his O2 to be 85% on RA, improved on 6L via NC. At time of evaluation, patient reports feeling significantly improved. Denies having any chest pain, abdominal pain, headache. States his only symptoms were shortness of breath. Patient is currently being worked up for malignancy. 67  year old male with PMHx HTN, ureteral lesion with hydronephrosis s/p L nephrostomy tube on 8/2/23, recently discharged from [facility] about 1 hour PTA s/p 20Fr faria and ureteral stent, presenting for shortness of breath upon discharge. Patient states he felt well after the procedure, then en route home he developed shortness of breath that progressively worsened. At home, his family members found him to be dyspneic and pale, so called EMS who found his O2 to be 85% on RA, improved on 6L via NC. At time of evaluation, patient reports feeling significantly improved. Denies having any chest pain, abdominal pain, headache. States his only symptoms were shortness of breath. Patient is currently being worked up for malignancy.

## 2023-08-25 NOTE — PROGRESS NOTE ADULT - ASSESSMENT
ASA 4  Mall 2  BRA 1.2%    Plan for INARI: [facility] thrombectomy of saddle PE via RFV with Dr. García    -heparin gtt @ 1400 units/hr  -confirmed NPO   - used for assessment and consent       Risks, benefits, and alternatives reviewed.  Risks including but not limited to MI, death, stroke, bleeding, infection, vessel injury, hematoma, were reviewed.  All questions answered.  Patient is agreeable to proceed.                                                                     ASA 4  Mall 2  BRA 1.2%    Plan for INARI: [facility] thrombectomy of saddle PE via RFV with Dr. García    -heparin gtt @ 1400 units/hr  -confirmed NPO   - used for assessment and consent       Risks, benefits, and alternatives reviewed.  Risks including but not limited to MI, death, stroke, bleeding, infection, vessel injury, hematoma, were reviewed.  All questions answered.  Patient is agreeable to proceed.     POST INARI NP NOTE:                                                               Now s/p Inari via RFV with Dr. García tolerated procedure well. Pt arrived to recovery in NAD and HDS, RFV access site stable, no bleed/hematoma, distal pulse +, flow statsis in place  Intraprocedurally: Fentanyl 50 ucg, Versed 1 mg, Heparin 3,000 Units  Findings: s/p PE thrombectomy saddle PE, PA 36 down to 23  Post Cath EKG:     Plan:  -Formal cath report pending  -Post procedure management/monitoring per protocol  -Access site precautions  -Flow stasis to be removed in am  -Bedrest with HOB 30 degree post procedure  -Labs  in am  -NS 0.9% 250ml/hr x 1 bolus: post procedure TIM ppx   -Repeat ECG if any clinical indication or change on tele  -Continue current medical therapy  -Restart Heparin gtt in 2 hours if ACT < 200  -Start Eliquis in am  -Educated regarding post procedure management and care  -Discussed the importance of RF modification  -Cardiac rehab info provided/referral and communication to cardiac rehab completed  -F/U outpt in 1-2 weeks with Cardiologist Dr. García  -DISPO: Plan as per primary team         ASA 4  Mall 2  BRA 1.2%    Plan for INARI: [facility] thrombectomy of saddle PE via RFV with Dr. García    -heparin gtt @ 1400 units/hr  -confirmed NPO   - used for assessment and consent       Risks, benefits, and alternatives reviewed.  Risks including but not limited to MI, death, stroke, bleeding, infection, vessel injury, hematoma, were reviewed.  All questions answered.  Patient is agreeable to proceed.     POST INARI NP NOTE:                                                               Now s/p Inari via RFV with Dr. García tolerated procedure well. Pt arrived to recovery in NAD and HDS, RFV access site stable, no bleed/hematoma, distal pulse +, flow statsis in place  Intraprocedurally: Fentanyl 50 ucg, Versed 1 mg, Heparin 3,000 Units  Findings: s/p PE thrombectomy saddle PE, PA 36 down to 23  Post Cath EKG:     Plan:  -Formal cath report pending  -Post procedure management/monitoring per protocol  -Access site precautions  -Flow stasis to be removed in am  -Bedrest with HOB 30 degree post procedure  -Labs  in am  -NS 0.9% 250ml/hr x 1 bolus: post procedure TIM ppx   -Repeat ECG if any clinical indication or change on tele  -Continue current medical therapy  -Restart Heparin gtt in 2 hours if ACT < 200  -Start Eliquis 10 mg BID x 7 days and 5 mg BID after  -Educated regarding post procedure management and care  -Discussed the importance of RF modification  -Cardiac rehab info provided/referral and communication to cardiac rehab completed  -F/U outpt in 1-2 weeks with Cardiologist Dr. García  -DISPO: Plan as per primary team

## 2023-08-25 NOTE — H&P ADULT - ASSESSMENT
67 year old male with PMH HTN, T2DM, Ascites, Left hydronephrosis and recently admitted for Left ureteroscopy and biopsy return within 24 hours of discharge with dyspnea.   Hypoxic on room air.  CT chest with saddle embolus. TTE with RV strain and Stanford signs.       Acute PE, saddle.  Hypoxia  - Admit to SDU  - Supplemental O2  - Heparin infusion  - LE dopplers  - PERT team planning for thrombectomy today    T2DM  - Blood Glucose monitoring with sliding scale Insulin  - Glargine 10U and Premeal Lispro 3U  - A1c in am    HTN  - Hold home antihypertensive for now.     Hyponatremia  - Salt tablets  - Monitor SNa; consider further work up if worsening    Ureteral Mass  - Maintain Nation  - Urology follow up

## 2023-08-25 NOTE — ED PROVIDER NOTE - PHYSICAL EXAMINATION
Gen: well appearing, no acute distress  Head: normocephalic, atraumatic  EENT: EOMI, moist mucous membranes, no scleral icterus, no JVD  Lung: no increased work of breathing, clear to auscultation bilaterally, no wheezing,  speaking in full sentences. Pulse ox: 93% on RA.   CV: tachycardic rate, regular rhythm, normal s1/s2  Abd: non-tender, (+) distended, no rebound tenderness or guarding; no CVA tenderness  MSK: No edema, no visible deformities, full range of motion in all 4 extremities  Neuro: Awake, alert, no focal neurologic deficits  Skin: No obvious rash, no jaundice  Psych: normal affect, normal speech Gen: well appearing, no acute distress  Head: normocephalic, atraumatic  EENT: EOMI, moist mucous membranes, no scleral icterus, no JVD  Lung: no increased work of breathing, clear to auscultation bilaterally, no wheezing,  speaking in full sentences. Pulse ox: 93% on RA.   CV: tachycardic rate, regular rhythm, normal s1/s2  Abd: non-tender, (+) distended, no rebound tenderness or guarding; no CVA tenderness. (+) L nephrostomy tube in place draining light pink urine; site appears clean/dry/intact  MSK: No edema, no visible deformities, full range of motion in all 4 extremities  Neuro: Awake, alert, no focal neurologic deficits  Skin: No obvious rash, no jaundice  Psych: normal affect, normal speech

## 2023-08-25 NOTE — ED ADULT NURSE NOTE - OBJECTIVE STATEMENT
pt to ED with complaints of SOB. pmh urethral stent placement, HTN. pt states he was at [facility] yesterday for urethral stent placement and was dc at midnight. pt went home and developed SOB. EMS arrived and noted pt to be 85% on RA. pt was then placed on 6 L NC. pt denies any cp/palpitations/discomfort. pt presents A+O x3. RR even and unlabored. pt denies current sob. pt omn 4l NC. NSR/ST on CM. pt with left nephrostomy and faria in place.

## 2023-08-25 NOTE — ED PROVIDER NOTE - ATTENDING CONTRIBUTION TO CARE
67-year-old male; with PMH significant for HTN, HLD, ureteral lesion status post nephrostomy tube on the left with stent placement just 1 hour prior to arrival; now presenting with acute onset shortness of breath.  Patient reports having uneventful procedure and felt well after the procedure.  Upon arrival home patient became increasingly dyspneic and lightheaded.  EMS reports patient was hypoxic to 85% with tachycardia and borderline hypotension.  Patient complaining of increasing shortness of breath.  Denies chest pain or palpitations.  Denies abdominal pain.  Family notes that patient has increasing abdominal girth over the past 3 days.  Notes the output from the nephrostomy tube has been normal.  General:     NAD  Head:     NC/AT, EOMI, oral mucosa moist  Neck:     trachea midline  Lungs:  decreased BS at bases  CVS:     S1S2, tachycardic, no m/g/r  Abd:     +BS, s/nt/distended no organomegaly  Ext:    2+ radial and pedal pulses, trace pedal edema   Neuro: AAOx3, no sensory/motor deficits  A/P:  67yoM p/w sob and abd distention  -labs, ekg, ct pe protocol, o2, ct a/p, re-eval    Upon my evaluation, this patient had a high probability of imminent or life-threatening deterioration due to _SADDLE PE__ , which required my direct attention, intervention, and personal management.    I have personally provided _40_ minutes of critical care time exclusive of time spent on separately billable procedures.  Time includes review of laboratory data, radiology results, discussion with consultants, and monitoring for potential decompensation.  Interventions were performed as documented.

## 2023-08-25 NOTE — ED PROVIDER NOTE - PROGRESS NOTE DETAILS
Cherri Smith, DO: CT appears to demonstrate large PE. Patient tachycardic to 103bpm, /100, pulse ox 98% on 3.5L O2. Appears comfortable, reports minor shortness of breath.  Patient and family updated on plan of care and initial CT findings with  Tj Guadarrama. Accurate weight obtained, heparin ordered. Pending official CT read. Will call PERT team. MD Ever:  MICU called and PERT team consulted for saddle PE. patient originally hypoxic, tachycardic, borderline hypotensive.  found to have saddle PE.  will heparinize pending PERT Team recommendation for thrombolytic vs procedural intervention. Cherri Smith, DO: CT demonstrating saddle PE. Case dw Dr. Tenorio, recommending consult to cardiology. Consult placed to cardiology. Cherri Smith, DO: Cardiology recs appreciated. Urology consulted given recent procedure and heparin drip. MICU also called, will see patient.

## 2023-08-25 NOTE — CONSULT NOTE ADULT - ASSESSMENT
68 y/o male with PMHx of HTN, ureteral lesion with hydronephrosis s/p left nephrostomy tube on 8/2/23, and recent discharge from [facility] 1 hour prior to arrival s/p 20Fr faria and ureteral stent, came in for acute onset of shortness of breath and palpitations. In ED patient had a CT angio chest performed which revealed a massive PE. MICU was consulted for possible obstructive shock in the setting of a massive PE.    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

## 2023-08-25 NOTE — CONSULT NOTE ADULT - ASSESSMENT
PE:    awake, alert, responsive feeling better at present on O2 therapy, then when he was home.      Vital Signs Last 24 Hrs  T(C): 36.9 (25 Aug 2023 08:34), Max: 36.9 (25 Aug 2023 08:34)  T(F): 98.5 (25 Aug 2023 08:34), Max: 98.5 (25 Aug 2023 08:34)  HR: 95 (25 Aug 2023 09:45) (82 - 132)  BP: 134/92 (25 Aug 2023 09:45) (112/62 - 142/86)  RR: 20 (25 Aug 2023 09:45) (17 - 26)  SpO2: 96% (25 Aug 2023 09:45) (90% - 100%)  Parameters below as of 25 Aug 2023 09:45  Patient On (Oxygen Delivery Method): nasal cannula  O2 Flow (L/min): 4      chest: good air exchange, good breathe sounds; denies chest pain,   abdomen: soft n/d, n/t  Back/Flank:  mild discomfort nephrostomy site- clean and dry dressing/ left nephrostomy tube draining well ( light pink urine )   : faria catheter in place light pink urine in bag good output.  denies, dysuria, burning or difficulty voiding prior to faria placed.       Labs:     CBC:                       11.8   10.01 )-----------( 254      ( 25 Aug 2023 04:05 )             35.0     08-25    128<L>  |  92<L>  |  12.9  ----------------------------<  296<H>  5.1   |  19.0<L>  |  0.85    8/25/23   PT 10.9 sec  PTT 25.9 sec  INR 0.98 ratio    await urinalysis results    RADIOLOGY:  CT Abdomen and Pelvis w/IV Cont ( 8/25/23 )     ACC: [id number] EXAM:  CT ANGIO CHEST PULM ART WAWIC   ORDERED BY: MANUEL VALENCIA     PROCEDURE DATE:  08/25/2023          INTERPRETATION:  CLINICAL INFORMATION: Tachycardia. Distended abdomen.   Were co for malignancy. Left nephrostomy tube.  COMPARISON: 8/7/2023 (CT of the chest was (, 8/1/2023 (CT of the abdomen)    CONTRAST/COMPLICATIONS:  IV Contrast: Omnipaque 350 (accession [id number]), IV contrast documented   inunlinked concurrent exam (accession [id number])  93 cc administered   7   cc discarded  Oral Contrast: NONE  Complications: None reported at time of study completion    PROCEDURE:  CT Angiography of the Chest was performed followed by portal venous phase   imaging of the Abdomen and Pelvis.  Sagittal and coronal reformats were performed as well as 3D (MIP)   reconstructions.    FINDINGS:  CHEST:  LUNGS AND LARGE AIRWAYS: Patent central airways. 8 mm sized calcified   granuloma in the right lower lobe. No parenchymal infiltration. Mild   dependent atelectasis especially in the right lung base.  PLEURA: No pleural effusion.  VESSELS: There is extensive pulmonary thromboembolism involving the   bifurcation of the main pulmonary artery extending tothe right and left   pulmonary artery, both upper and lower and right middle lobar and   segmental branches  HEART: Heart size is normal. No evidence of right heart strain. No   pericardial effusion.  MEDIASTINUM AND VICENTA: No lymphadenopathy.  CHEST WALL AND LOWER NECK: Within normal limits.    ABDOMEN AND PELVIS:  LIVER: Within normal limits.  BILE DUCTS: Normal caliber.  GALLBLADDER: Cholecystectomy.  SPLEEN: Within normal limits.  PANCREAS: Within normal limits.  ADRENALS: Within normal limits.  KIDNEYS/URETERS: Left nephrostomy tube with its distal tip in the   bladder. Known distal ureteral enhancing lesion is not visualized due to   indwelling catheter. Significantly decreased left hydroureteronephrosis.   Subcentimeter exophytic right renal cyst.    BLADDER: Partially distended with a Faria catheter in it. Nondependent   air likely introduced air.  REPRODUCTIVE ORGANS: Prostate not enlarged.    BOWEL: No bowel obstruction. No appendicitis. Sigmoid diverticulosis   without diverticulitis.  PERITONEUM: Interval increase of ascites. Nodular omental thickening and   peritoneal stranding is noted.  VESSELS: Moderate atherosclerotic calcification.  RETROPERITONEUM/LYMPH NODES: No lymphadenopathy.  ABDOMINAL WALL: Within normal limits.  BONES: No metastatic lesion. No fracture or malalignment.    IMPRESSION:  Extensive pulmonary thromboembolism including saddle embolus.  No right heart strain.  Interval increase of ascites with redemonstration of peritoneal   carcinomatosis.  Interval decrease of left hydroureteronephrosis with nephrostomy tube in   the collecting system.    Referring provider was not available to discuss at the time of dictation.      --- End of Report ---            Impression: POD# 1  with left nephrostomy tube ( 8/1/23 )  s/p ureteroendoscopy, excisional biopsy ureter mass placement stent.   With acute onset  SOB, ( large saddle thromboembolism )  discussed with surgeon present situation and continued care and management  Plan:  Continue present care and management per vascular cardiology and medicine.  No contra-indiction for anticoagulation therapy.   No urological intervention at present time we will follow peripherally while in hospital - please recall if needed.

## 2023-08-25 NOTE — H&P ADULT - NSHPPHYSICALEXAM_GEN_ALL_CORE
OBJECTIVE:  Vital Signs Last 24 Hrs  T(C): 36.8 (25 Aug 2023 13:04), Max: 36.9 (25 Aug 2023 08:34)  T(F): 98.2 (25 Aug 2023 13:04), Max: 98.5 (25 Aug 2023 08:34)  HR: 101 (25 Aug 2023 13:57) (82 - 132)  BP: 151/89 (25 Aug 2023 13:57) (112/62 - 151/89)  BP(mean): --  RR: 15 (25 Aug 2023 13:57) (15 - 26)  SpO2: 97% (25 Aug 2023 13:57) (90% - 99%)    Parameters below as of 25 Aug 2023 13:57  Patient On (Oxygen Delivery Method): room air        PHYSICAL EXAMINATION  General:   HEENT:    NECK:    CVS:   RESP:    GI:    :   MSK:    CNS:    INTEG:    PSYCH: OBJECTIVE:  Vital Signs Last 24 Hrs  T(C): 36.8 (25 Aug 2023 13:04), Max: 36.9 (25 Aug 2023 08:34)  T(F): 98.2 (25 Aug 2023 13:04), Max: 98.5 (25 Aug 2023 08:34)  HR: 101 (25 Aug 2023 13:57) (82 - 132)  BP: 151/89 (25 Aug 2023 13:57) (112/62 - 151/89)  BP(mean): --  RR: 15 (25 Aug 2023 13:57) (15 - 26)  SpO2: 97% (25 Aug 2023 13:57) (90% - 99%)    Parameters below as of 25 Aug 2023 13:57  Patient On (Oxygen Delivery Method): room air        PHYSICAL EXAMINATION  General: Middle aged male lying on stretcher, comfortable  HEENT: Nasal cannula in place EOMI  NECK:  Supple  CVS: regular rate and rhythm S1 S2  RESP:  CTAB  GI:  Soft protuberant nontender BS+  : Nation with yellow urine  MSK:  FROM. No C/C/E  CNS:  AOX3. No gross focal or global deficit appreciated  INTEG:  warm dry skin  PSYCH: Fair mood

## 2023-08-25 NOTE — H&P ADULT - HISTORY OF PRESENT ILLNESS
67 year old male with PMH HTN, T2DM, Ascites, Left hydronephrosis and recently admitted for Left ureteroscopy and biopsy return within 24 hours of discharge with dyspnea.      67 year old male with PMH HTN, T2DM, Ascites, Left hydronephrosis and recently admitted for Left ureteroscopy and biopsy return within 24 hours of discharge with dyspnea. Denies any chest pain, dizziness, palpitations.

## 2023-08-25 NOTE — CONSULT NOTE ADULT - NS PANP COMMENT GEN_ALL_CORE FT
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 status post left-sided nephrostomy in early August and ureteral mass resection and stent placement on August 24 comes in within 24 hours with shortness of breath palpitation being admitted for acute hypoxic respiratory failure secondary to saddle PE with cor pulmonale and right lower extremity DVT     medical ICU consulted as part of PE response team   simplified PE severity index: 9%   recommend:  Trend troponin, BNP  Continuous telemetry and pulse ox monitoring (can be admitted to telemetry/stepdown unit)  TTE  CT head  Continuation of heparin for VTE protocol  Recommend against tPA with recent surgery at very high risk of bleeding especially with improvement in tachycardia and stable oxygenation on minimal supplemental support with normal blood pressure  Suction thrombectomy/plan discussed with cardiology    Plan discussed with ED team and ICU team as well as patient and all questions answered    Thank you for your consult.   Please call us back with any worsening clinical status or with concerns & questions.   We will Sign Off for now.     Keith Hernández MD   Division of Critical Care Medicine  Department of Medicine   [facility]   Cell [phone] cause of death shift just delusions

## 2023-08-25 NOTE — ED ADULT NURSE REASSESSMENT NOTE - NS ED NURSE REASSESS COMMENT FT1
Pt had no acute events today. Very pleasant/cooperative. A&Ox4. VSS on 1LNC. C/o pain in R hip/leg. PRN Tramadol given and flexeril added to help manage. Tolerating cardiac diet. OOB independently with FWW. US duplex and ECHO completed. Plan to continue to diurese pt and maintain hemodynamic stability.     Problem: Knowledge Deficit - Standard  Goal: Patient and family/care givers will demonstrate understanding of plan of care, disease process/condition, diagnostic tests and medications  Outcome: Progressing     Problem: Fall Risk  Goal: Patient will remain free from falls  Outcome: Progressing     Problem: Depression  Goal: Patient and family/caregiver will verbalize accurate information about at least two of the possible causes of depression, three-four of the signs and symptoms of depression  Outcome: Progressing     Problem: Provide Safe Environment  Goal: Suicide environmental safety, protocols, policies, and practices will be implemented  Outcome: Progressing     Problem: Psychosocial  Goal: Patient's ability to identify and develop effective coping behaviors will improve  Outcome: Progressing  Goal: Patient's ability to identify and utilize available support systems will improve  Outcome: Progressing     Problem: Pain - Standard  Goal: Alleviation of pain or a reduction in pain to the patient’s comfort goal  Outcome: Progressing      report given to cath lab RN at 1300. pt placed into gown. awaiting transport. continued cardiac monitoring in place. rr even and unlabored. pt educated on plan of care, pt able to successfully teach back plan of care to RN, RN will continue to reeducate pt during hospital stay.

## 2023-08-25 NOTE — ED ADULT NURSE NOTE - CHIEF COMPLAINT QUOTE
pt BIBEMS s/p discharge from [facility] after having urethral stent placed. pt arrived with faria and drainage bag in place. pt endorses being home after being discharged and become SOB. EMS states upon arrival pt was 85% on RA placed on 6L nasal. ekg and glucose obtained prior to triage.

## 2023-08-26 LAB
ALBUMIN SERPL ELPH-MCNC: 2.7 G/DL — LOW (ref 3.3–5.2)
ALP SERPL-CCNC: 96 U/L — SIGNIFICANT CHANGE UP (ref 40–120)
ALT FLD-CCNC: 13 U/L — SIGNIFICANT CHANGE UP
ANION GAP SERPL CALC-SCNC: 13 MMOL/L — SIGNIFICANT CHANGE UP (ref 5–17)
AST SERPL-CCNC: 22 U/L — SIGNIFICANT CHANGE UP
BASOPHILS # BLD AUTO: 0.02 K/UL — SIGNIFICANT CHANGE UP (ref 0–0.2)
BASOPHILS NFR BLD AUTO: 0.3 % — SIGNIFICANT CHANGE UP (ref 0–2)
BILIRUB SERPL-MCNC: 0.4 MG/DL — SIGNIFICANT CHANGE UP (ref 0.4–2)
BUN SERPL-MCNC: 11.9 MG/DL — SIGNIFICANT CHANGE UP (ref 8–20)
CALCIUM SERPL-MCNC: 8.1 MG/DL — LOW (ref 8.4–10.5)
CHLORIDE SERPL-SCNC: 96 MMOL/L — SIGNIFICANT CHANGE UP (ref 96–108)
CO2 SERPL-SCNC: 22 MMOL/L — SIGNIFICANT CHANGE UP (ref 22–29)
CREAT SERPL-MCNC: 0.83 MG/DL — SIGNIFICANT CHANGE UP (ref 0.5–1.3)
EGFR: 97 ML/MIN/1.73M2 — SIGNIFICANT CHANGE UP
EOSINOPHIL # BLD AUTO: 0.05 K/UL — SIGNIFICANT CHANGE UP (ref 0–0.5)
EOSINOPHIL NFR BLD AUTO: 0.7 % — SIGNIFICANT CHANGE UP (ref 0–6)
GLUCOSE BLDC GLUCOMTR-MCNC: 150 MG/DL — HIGH (ref 70–99)
GLUCOSE BLDC GLUCOMTR-MCNC: 189 MG/DL — HIGH (ref 70–99)
GLUCOSE BLDC GLUCOMTR-MCNC: 212 MG/DL — HIGH (ref 70–99)
GLUCOSE BLDC GLUCOMTR-MCNC: 252 MG/DL — HIGH (ref 70–99)
GLUCOSE SERPL-MCNC: 146 MG/DL — HIGH (ref 70–99)
HCT VFR BLD CALC: 33.7 % — LOW (ref 39–50)
HGB BLD-MCNC: 10.9 G/DL — LOW (ref 13–17)
IMM GRANULOCYTES NFR BLD AUTO: 0.4 % — SIGNIFICANT CHANGE UP (ref 0–0.9)
LYMPHOCYTES # BLD AUTO: 1.23 K/UL — SIGNIFICANT CHANGE UP (ref 1–3.3)
LYMPHOCYTES # BLD AUTO: 17.1 % — SIGNIFICANT CHANGE UP (ref 13–44)
MCHC RBC-ENTMCNC: 27.8 PG — SIGNIFICANT CHANGE UP (ref 27–34)
MCHC RBC-ENTMCNC: 32.3 GM/DL — SIGNIFICANT CHANGE UP (ref 32–36)
MCV RBC AUTO: 86 FL — SIGNIFICANT CHANGE UP (ref 80–100)
MONOCYTES # BLD AUTO: 0.62 K/UL — SIGNIFICANT CHANGE UP (ref 0–0.9)
MONOCYTES NFR BLD AUTO: 8.6 % — SIGNIFICANT CHANGE UP (ref 2–14)
NEUTROPHILS # BLD AUTO: 5.25 K/UL — SIGNIFICANT CHANGE UP (ref 1.8–7.4)
NEUTROPHILS NFR BLD AUTO: 72.9 % — SIGNIFICANT CHANGE UP (ref 43–77)
PLATELET # BLD AUTO: 270 K/UL — SIGNIFICANT CHANGE UP (ref 150–400)
POTASSIUM SERPL-MCNC: 4.2 MMOL/L — SIGNIFICANT CHANGE UP (ref 3.5–5.3)
POTASSIUM SERPL-SCNC: 4.2 MMOL/L — SIGNIFICANT CHANGE UP (ref 3.5–5.3)
PROT SERPL-MCNC: 5.5 G/DL — LOW (ref 6.6–8.7)
RBC # BLD: 3.92 M/UL — LOW (ref 4.2–5.8)
RBC # FLD: 13.2 % — SIGNIFICANT CHANGE UP (ref 10.3–14.5)
SODIUM SERPL-SCNC: 131 MMOL/L — LOW (ref 135–145)
WBC # BLD: 7.05 K/UL — SIGNIFICANT CHANGE UP (ref 3.8–10.5)
WBC # FLD AUTO: 7.05 K/UL — SIGNIFICANT CHANGE UP (ref 3.8–10.5)

## 2023-08-26 PROCEDURE — 99233 SBSQ HOSP IP/OBS HIGH 50: CPT

## 2023-08-26 PROCEDURE — 93010 ELECTROCARDIOGRAM REPORT: CPT

## 2023-08-26 PROCEDURE — 99232 SBSQ HOSP IP/OBS MODERATE 35: CPT

## 2023-08-26 RX ORDER — HEPARIN SODIUM 5000 [USP'U]/ML
1200 INJECTION INTRAVENOUS; SUBCUTANEOUS
Qty: 25000 | Refills: 0 | Status: DISCONTINUED | OUTPATIENT
Start: 2023-08-26 | End: 2023-08-26

## 2023-08-26 RX ORDER — ACETAMINOPHEN 500 MG
1000 TABLET ORAL ONCE
Refills: 0 | Status: COMPLETED | OUTPATIENT
Start: 2023-08-26 | End: 2023-08-26

## 2023-08-26 RX ORDER — ACETAMINOPHEN 500 MG
650 TABLET ORAL ONCE
Refills: 0 | Status: COMPLETED | OUTPATIENT
Start: 2023-08-26 | End: 2023-08-26

## 2023-08-26 RX ORDER — HEPARIN SODIUM 5000 [USP'U]/ML
4700 INJECTION INTRAVENOUS; SUBCUTANEOUS EVERY 6 HOURS
Refills: 0 | Status: DISCONTINUED | OUTPATIENT
Start: 2023-08-26 | End: 2023-08-26

## 2023-08-26 RX ADMIN — SODIUM CHLORIDE 1 GRAM(S): 9 INJECTION INTRAMUSCULAR; INTRAVENOUS; SUBCUTANEOUS at 05:22

## 2023-08-26 RX ADMIN — Medication 2: at 18:13

## 2023-08-26 RX ADMIN — APIXABAN 10 MILLIGRAM(S): 2.5 TABLET, FILM COATED ORAL at 05:22

## 2023-08-26 RX ADMIN — HEPARIN SODIUM 1200 UNIT(S)/HR: 5000 INJECTION INTRAVENOUS; SUBCUTANEOUS at 01:07

## 2023-08-26 RX ADMIN — SODIUM CHLORIDE 1 GRAM(S): 9 INJECTION INTRAMUSCULAR; INTRAVENOUS; SUBCUTANEOUS at 21:47

## 2023-08-26 RX ADMIN — SODIUM CHLORIDE 1 GRAM(S): 9 INJECTION INTRAMUSCULAR; INTRAVENOUS; SUBCUTANEOUS at 14:27

## 2023-08-26 RX ADMIN — Medication 1: at 21:46

## 2023-08-26 RX ADMIN — INSULIN GLARGINE 10 UNIT(S): 100 INJECTION, SOLUTION SUBCUTANEOUS at 21:46

## 2023-08-26 RX ADMIN — Medication 650 MILLIGRAM(S): at 23:40

## 2023-08-26 RX ADMIN — Medication 400 MILLIGRAM(S): at 10:52

## 2023-08-26 RX ADMIN — APIXABAN 10 MILLIGRAM(S): 2.5 TABLET, FILM COATED ORAL at 18:12

## 2023-08-26 RX ADMIN — Medication 1: at 13:26

## 2023-08-26 NOTE — PROCEDURE NOTE - NSPATIENTPOSTION_GEN_A_CORE
supine Subsequent Stages Histo Method Verbiage: Using a similar technique to that described above, a thin layer of tissue was removed from all areas where tumor was visible on the previous stage.  The tissue was again oriented, mapped, dyed, and processed as above.

## 2023-08-26 NOTE — PROGRESS NOTE ADULT - NS ATTEND AMEND GEN_ALL_CORE FT
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 Patient on RA.   F/U LIMITED TTE  ordered to evaluate psot INARI to characterize RV/LV  Patient  s/p Heparin drip full AC normogram, transitioned to Eliquis today  C/W ELIQUIS 10MG PO BID X 7DAYS, FOLLOWED BY Eliquis 5mg po bid  Check CBC. Bleeding precautions  F/U outpt in 1-2 weeks with Cardiologist Dr. 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 Patient on RA.   F/U  TTE  ordered to evaluate post INARI to characterize RV/LV  Patient  s/p Heparin drip full AC normogram, transitioned to Eliquis today  Check CBC. Bleeding precautions  F/U outpt in 1-2 weeks with Cardiologist Dr. García  Cardiology will sign off IF f/u  TTE with no acute concerns

## 2023-08-26 NOTE — PROGRESS NOTE ADULT - ASSESSMENT
67 year old male with PMH HTN, T2DM, Ascites, Left hydronephrosis and recently admitted for Left ureteroscopy and biopsy return within 24 hours of discharge with dyspnea.   Hypoxic on room air.  CT chest with saddle embolus. TTE with RV strain and [place] signs. Taken for Thrombectomy 8/25 with resolution of symptoms, dressing removed from groin site with mild bleeding noted. Continue to monitor.        #Acute PE, saddle.  Hypoxia, now saturating well on RA   - In setting of recent procedure / active malignancy   - Supplemental O2 as needed, will attempt O2 eval when ambulatory   - Heparin infusion DC'd, now transitioned to Eliquis   - LE dopplers shows RLE DVT   - PERT team evaluated, s/p Mechanical thrombectomy 8/25     #T2DM  - Blood Glucose monitoring with sliding scale Insulin  - Glargine 10U and Premeal Lispro 3U  - A1c 8.9 on 8/22    #HTN  - Hold home antihypertensive for now.     Hyponatremia  - Improving, Na 131 this AM,   - noted Na 130 8/1 (Na 135 8/24)   - Salt tablets 1 g TID, will continue for now, consider urine Na as for continuation of salt tablets    Ureteral Mass  - Maintain Nation  - Urology follow up outpatient   - will contact heme/onc, will likely need outpatient follow up     DVT prophylaxis: Eliquis   Diet: Regular   Dispo: Pending clinical improvement, likely DC in AM if stable / no need for O2

## 2023-08-26 NOTE — PROGRESS NOTE ADULT - SUBJECTIVE AND OBJECTIVE BOX
Department of Cardiology                                                                  [facility]/[place] [address] [address]                                                            Telephone: [phone]. Fax:[phone]                                                                                      Cardiac Cath NP Note           Patient is a 67y old  Male who presents with a chief complaint of Dyspnea  PE (      HPI:      68 y/o male with PMHx of HTN, ureteral mass with hydronephrosis s/p left nephrostomy tube on 8/2/23, and recent discharge from [facility] 1day prior to arrival s/p 20 Fr faria and ureteral stent, came in for acute onset of shortness of breath. Patient stated he had just arrived at home when all of a sudden he felt like he couldn't breath or couldn't get enough air into his lungs. Patient also stated his heart felt like it was pounding in his chest. In ED today, patient was initially tachycardic to 103 with a bp of 148/100 and SpO2 of 98% on 3.5L NC. He had a CT angio Chest performed which showed a massive PE.     Ptaient recently been hospitalized 2 weeks ago here at [facility] for malignancy workup and management of hydronephrosis that required nephrostomy tube placement. Patient had also traveled to [facility] prior to hospitalization 2 weeks ago were he had a paracentesis done for ascites and was informed there was concern for cancer.    patient underwent     PAST MEDICAL & SURGICAL HISTORY:      PAST MEDICAL & SURGICAL HISTORY:  HTN (hypertension)      Diabetes      Hydronephrosis, left      Ascites      History of cholecystectomy      H/O arthroscopy of right knee          PREVIOUS DIAGNOSTIC TESTING:      ECHO  FINDINGS:    STRESS  FINDINGS:    CATHETERIZATION  FINDINGS:      Allergies    No Known Allergies    Intolerances        MEDICATIONS  (STANDING):  apixaban 10 milliGRAM(s) Oral two times a day  dextrose 5%. 1000 milliLiter(s) (50 mL/Hr) IV Continuous <Continuous>  dextrose 5%. 1000 milliLiter(s) (100 mL/Hr) IV Continuous <Continuous>  dextrose 50% Injectable 12.5 Gram(s) IV Push once  dextrose 50% Injectable 25 Gram(s) IV Push once  dextrose 50% Injectable 25 Gram(s) IV Push once  glucagon  Injectable 1 milliGRAM(s) IntraMuscular once  insulin glargine Injectable (LANTUS) 10 Unit(s) SubCutaneous at bedtime  insulin lispro (ADMELOG) corrective regimen sliding scale   SubCutaneous three times a day before meals  insulin lispro (ADMELOG) corrective regimen sliding scale   SubCutaneous at bedtime  sodium chloride 1 Gram(s) Oral three times a day    MEDICATIONS  (PRN):  dextrose Oral Gel 15 Gram(s) Oral once PRN Blood Glucose LESS THAN 70 milliGRAM(s)/deciliter      FAMILY HISTORY:  FH: brain cancer (Sibling)        SOCIAL HISTORY:    CIGARETTES:    ALCOHOL:    REVIEW OF SYSTEMS:  CONSTITUTIONAL: No fever, weight loss, or fatigue  EYES: No eye pain, visual disturbances, or discharge  ENMT:  No difficulty hearing, tinnitus, vertigo; No sinus or throat pain  NECK: No pain or stiffness  RESPIRATORY: No cough, wheezing, chills or hemoptysis; No Shortness of Breath  CARDIOVASCULAR: No chest pain, palpitations, passing out, dizziness, or leg swelling  GASTROINTESTINAL: No abdominal or epigastric pain. No nausea, vomiting, or hematemesis; No diarrhea or constipation. No melena or hematochezia.  GENITOURINARY: No dysuria, frequency, hematuria, or incontinence  NEUROLOGICAL: No headaches, memory loss, loss of strength, numbness, or tremors  SKIN: No itching, burning, rashes, or lesions   LYMPH Nodes: No enlarged glands  ENDOCRINE: No heat or cold intolerance; No hair loss  MUSCULOSKELETAL: No joint pain or swelling; No muscle, back, or extremity pain  PSYCHIATRIC: No depression, anxiety, mood swings, or difficulty sleeping  HEME/LYMPH: No easy bruising, or bleeding gums  ALLERY AND IMMUNOLOGIC: No hives or eczema	    Vital Signs Last 24 Hrs  T(C): 36.7 (26 Aug 2023 07:45), Max: 37 (25 Aug 2023 21:00)  T(F): 98.1 (26 Aug 2023 07:45), Max: 98.6 (25 Aug 2023 21:00)  HR: 100 (26 Aug 2023 08:00) (93 - 101)  BP: 119/87 (26 Aug 2023 08:00) (100/70 - 151/89)  BP(mean): 94 (26 Aug 2023 08:00) (62 - 94)  RR: 18 (26 Aug 2023 08:00) (15 - 22)  SpO2: 98% (26 Aug 2023 08:00) (94% - 100%)    Parameters below as of 26 Aug 2023 08:00  Patient On (Oxygen Delivery Method): room air        Daily     Daily     I&O's Detail    25 Aug 2023 07:01  -  26 Aug 2023 07:00  --------------------------------------------------------  IN:    Heparin Infusion: 56 mL    Heparin Infusion: 60 mL    Oral Fluid: 560 mL    Sodium Chloride 0.9% Bolus: 250 mL  Total IN: 926 mL    OUT:    Indwelling Catheter - Urethral (mL): 550 mL    Nephrostomy Tube (mL): 550 mL  Total OUT: 1100 mL    Total NET: -174 mL          PHYSICAL EXAM:  Constitutional: A & O x 3  HEENT:   Normal oral mucosa, PERRL, EOMI	  Cardiovascular: Normal S1 S2, No JVD, No murmurs, No edema  Respiratory: Lungs clear to auscultation	  Gastrointestinal:  Soft, Non-tender, + BS	  Skin: No rashes, No ecchymoses, No cyanosis  Neurologic: Non-focal  Extremities: Normal range of motion, No clubbing, cyanosis or edema  Vascular: Peripheral pulses palpable 2+ bilaterally      INTERPRETATION OF TELEMETRY:    ECG:    LABS:                        10.9   7.05  )-----------( 270      ( 26 Aug 2023 08:55 )             33.7     08-26    131<L>  |  96  |  11.9  ----------------------------<  146<H>  4.2   |  22.0  |  0.83    Ca    8.1<L>      26 Aug 2023 08:55    TPro  5.5<L>  /  Alb  2.7<L>  /  TBili  0.4  /  DBili  x   /  AST  22  /  ALT  13  /  AlkPhos  96  08-26    CARDIAC MARKERS ( 25 Aug 2023 03:15 )  x     / <0.01 ng/mL / x     / x     / x          PT/INR - ( 25 Aug 2023 04:05 )   PT: 10.9 sec;   INR: 0.98 ratio         PTT - ( 25 Aug 2023 22:36 )  PTT:101.1 sec  Urinalysis Basic - ( 26 Aug 2023 08:55 )    Color: x / Appearance: x / SG: x / pH: x  Gluc: 146 mg/dL / Ketone: x  / Bili: x / Urobili: x   Blood: x / Protein: x / Nitrite: x   Leuk Esterase: x / RBC: x / WBC x   Sq Epi: x / Non Sq Epi: x / Bacteria: x      I&O's Summary    25 Aug 2023 07:01  -  26 Aug 2023 07:00  --------------------------------------------------------  IN: 926 mL / OUT: 1100 mL / NET: -174 mL      BNP    RADIOLOGY & ADDITIONAL STUDIES:                                                                             Department of Cardiology                                                                  [facility]/[place] [address] [address]                                                            Telephone: [phone]. Fax:[phone]                                                                                          Patient is a 67y old  Male who presents with a chief complaint of Dyspnea    Overnight events: None    Patient speaks Tongan, offered language line  service, patient prefers to be communicated via son, who speaks English and English at bedside    HPI:     66 y/o male with PMHx of HTN, ureteral mass with hydronephrosis s/p left nephrostomy tube on 8/2/23, and recent discharge from [facility] 1day prior to arrival s/p 20 Fr faria and ureteral stent, came in for acute onset of shortness of breath. Patient stated he had just arrived at home when all of a sudden he felt like he couldn't breath or couldn't get enough air into his lungs. Patient also stated his heart felt like it was pounding in his chest. In ED today, patient was initially tachycardic to 103 with a bp of 148/100 and SpO2 of 98% on 3.5L NC. He had a CT angio Chest performed which showed a massive PE.     Patient recently been hospitalized 2 weeks ago here at [facility] for malignancy workup and management of hydronephrosis that required nephrostomy tube placement. Patient had also traveled to [facility] prior to hospitalization 2 weeks ago were he had a paracentesis done for ascites and was informed there was concern for cancer.    Patient with massive PE, Acute DVT RLE,   BNP: 993, TROP negative  TTE on 08/25/23 with Left ventricular ejection fraction, 75%. Hyperdynamic global left ventricular systolic function, Grade I diastolic dysfunction).   Right ventricular volume overload, oderately enlarged right ventricle and severely reduced RV systolic   function; there appears to be McConnells sign, TAPSE 1.45 cm.    S/P  INARI: mechanical  thrombectomy of saddle PE via RFV on 08/26/23  post thrombectomy patient was  on heparin gtt, transitioned to Eliqusi today  RFV low flow stasis suture removed today by author, right groin benign with no bleeding/hematoma.    Evaluated the patient at bedside, patient lying in bed, on RA, in no distress, DENIES ha, dizziness, SOB, CP    PAST MEDICAL & SURGICAL HISTORY:      PAST MEDICAL & SURGICAL HISTORY:  HTN (hypertension)      Diabetes      Hydronephrosis, left      Ascites        History of cholecystectomy      H/O arthroscopy of right knee          PREVIOUS DIAGNOSTIC TESTING:      ECHO  FINDINGS:      < from: TTE Echo Complete w/ Contrast w/ Doppler (08.25.23 @ 09:00) >   1. Left ventricular ejection fraction, by visual estimation, is>75%.   2. Hyperdynamic global left ventricular systolic function.   3. Spectral Doppler shows impaired relaxation pattern of left   ventricular myocardial filling (Grade I diastolic dysfunction).   4. Right ventricular volume overload.   5. Moderately enlarged right ventricle and severely reduced RV systolic   function; there appears to be McConnells sign, TAPSE 1.45 cm.   6. The left atrium is normal in size.   7. Trace mitral valve regurgitation.   8. Mild thickening of the anterior and posterior mitral valve leaflets.   9. Mild tricuspid regurgitation.  10. Mild pulmonic valve regurgitation.  11. Moderate ascites.  12. There is no evidence of pericardial effusion.  13. Endocardial visualization was enhanced with intravenous echo contrast.  14. There are no prior studies on this patient for comparison purposes.      STRESS  FINDINGS: NA      Allergies    No Known Allergies    Intolerances        MEDICATIONS  (STANDING):  apixaban 10 milliGRAM(s) Oral two times a day  dextrose 5%. 1000 milliLiter(s) (50 mL/Hr) IV Continuous <Continuous>  dextrose 5%. 1000 milliLiter(s) (100 mL/Hr) IV Continuous <Continuous>  dextrose 50% Injectable 12.5 Gram(s) IV Push once  dextrose 50% Injectable 25 Gram(s) IV Push once  dextrose 50% Injectable 25 Gram(s) IV Push once  glucagon  Injectable 1 milliGRAM(s) IntraMuscular once  insulin glargine Injectable (LANTUS) 10 Unit(s) SubCutaneous at bedtime  insulin lispro (ADMELOG) corrective regimen sliding scale   SubCutaneous three times a day before meals  insulin lispro (ADMELOG) corrective regimen sliding scale   SubCutaneous at bedtime  sodium chloride 1 Gram(s) Oral three times a day    MEDICATIONS  (PRN):  dextrose Oral Gel 15 Gram(s) Oral once PRN Blood Glucose LESS THAN 70 milliGRAM(s)/deciliter      FAMILY HISTORY:  FH: brain cancer (Sibling)        SOCIAL HISTORY:    CIGARETTES:    ALCOHOL:    REVIEW OF SYSTEMS:  CONSTITUTIONAL: No fever, weight loss, or fatigue  EYES: No eye pain, visual disturbances, or discharge  ENMT:  No difficulty hearing, tinnitus, vertigo; No sinus or throat pain  NECK: No pain or stiffness  RESPIRATORY: No cough, wheezing, chills or hemoptysis; No Shortness of Breath  CARDIOVASCULAR: No chest pain, palpitations, passing out, dizziness, or leg swelling, Right groin benign  GASTROINTESTINAL: No abdominal or epigastric pain. No nausea, vomiting, or hematemesis; No diarrhea or constipation. No melena or hematochezia.  GENITOURINARY: No dysuria, frequency, hematuria, or incontinence  NEUROLOGICAL: No headaches, memory loss, loss of strength, numbness, or tremors  SKIN: No itching, burning, rashes, or lesions   LYMPH Nodes: No enlarged glands  ENDOCRINE: No heat or cold intolerance; No hair loss  MUSCULOSKELETAL: No joint pain or swelling; No muscle, back, or extremity pain  PSYCHIATRIC: No depression, anxiety, mood swings, or difficulty sleeping  HEME/LYMPH: No easy bruising, or bleeding gums  ALLERY AND IMMUNOLOGIC: No hives or eczema	    Vital Signs Last 24 Hrs  T(C): 36.7 (26 Aug 2023 07:45), Max: 37 (25 Aug 2023 21:00)  T(F): 98.1 (26 Aug 2023 07:45), Max: 98.6 (25 Aug 2023 21:00)  HR: 100 (26 Aug 2023 08:00) (93 - 101)  BP: 119/87 (26 Aug 2023 08:00) (100/70 - 151/89)  BP(mean): 94 (26 Aug 2023 08:00) (62 - 94)  RR: 18 (26 Aug 2023 08:00) (15 - 22)  SpO2: 98% (26 Aug 2023 08:00) (94% - 100%)    Parameters below as of 26 Aug 2023 08:00  Patient On (Oxygen Delivery Method): room air        Daily     Daily     I&O's Detail    25 Aug 2023 07:01  -  26 Aug 2023 07:00  --------------------------------------------------------  IN:    Heparin Infusion: 56 mL    Heparin Infusion: 60 mL    Oral Fluid: 560 mL    Sodium Chloride 0.9% Bolus: 250 mL  Total IN: 926 mL    OUT:    Indwelling Catheter - Urethral (mL): 550 mL    Nephrostomy Tube (mL): 550 mL  Total OUT: 1100 mL    Total NET: -174 mL          PHYSICAL EXAM:  Constitutional: A & O x 3  HEENT:   Normal oral mucosa, PERRL, EOMI	  Cardiovascular: Normal S1 S2, No JVD, No murmurs, No edema  Respiratory: Lungs clear to auscultation	  Gastrointestinal:  Soft, Non-tender, + BS	  Skin: No rashes, No ecchymoses, No cyanosis  Neurologic: Non-focal  Extremities: Normal range of motion, No clubbing, cyanosis or edema  Vascular: Peripheral pulses palpable 2+ bilaterally, Right groin s/p low flow stasis suture removal today, RIGHT GROIN benign   :  nephrostomy tube draining well ( light pink urine ) Faria with tea colored urine    INTERPRETATION OF TELEMETRY:    ECG:    LABS:                        10.9   7.05  )-----------( 270      ( 26 Aug 2023 08:55 )             33.7     08-26    131<L>  |  96  |  11.9  ----------------------------<  146<H>  4.2   |  22.0  |  0.83    Ca    8.1<L>      26 Aug 2023 08:55    TPro  5.5<L>  /  Alb  2.7<L>  /  TBili  0.4  /  DBili  x   /  AST  22  /  ALT  13  /  AlkPhos  96  08-26    CARDIAC MARKERS ( 25 Aug 2023 03:15 )  x     / <0.01 ng/mL / x     / x     / x          PT/INR - ( 25 Aug 2023 04:05 )   PT: 10.9 sec;   INR: 0.98 ratio         PTT - ( 25 Aug 2023 22:36 )  PTT:101.1 sec  Urinalysis Basic - ( 26 Aug 2023 08:55 )    Color: x / Appearance: x / SG: x / pH: x  Gluc: 146 mg/dL / Ketone: x  / Bili: x / Urobili: x   Blood: x / Protein: x / Nitrite: x   Leuk Esterase: x / RBC: x / WBC x   Sq Epi: x / Non Sq Epi: x / Bacteria: x      I&O's Summary    25 Aug 2023 07:01  -  26 Aug 2023 07:00  --------------------------------------------------------  IN: 926 mL / OUT: 1100 mL / NET: -174 mL      BNP    RADIOLOGY & ADDITIONAL STUDIES:                                                                                       Department of Cardiology                                                                  [facility]/[place] [address] [address]                                                            Telephone: [phone]. Fax:[phone]                                                                                          Patient is a 67y old  Male who presents with a chief complaint of Dyspnea    Overnight events: None    Patient speaks Swedish, offered language line  service, patient prefers to be communicated via son, who speaks English and Sinhala at bedside    HPI:     66 y/o male with PMHx of HTN, ureteral mass with hydronephrosis s/p left nephrostomy tube on 8/2/23, and recent discharge from [facility] 1day prior to arrival s/p 20 Fr faria and ureteral stent, came in for acute onset of shortness of breath. Patient stated he had just arrived at home when all of a sudden he felt like he couldn't breath or couldn't get enough air into his lungs. Patient also stated his heart felt like it was pounding in his chest. In ED today, patient was initially tachycardic to 103 with a bp of 148/100 and SpO2 of 98% on 3.5L NC. He had a CT angio Chest performed which showed a massive PE.     Patient recently been hospitalized 2 weeks ago here at [facility] for malignancy workup and management of hydronephrosis that required nephrostomy tube placement. Patient had also traveled to [facility] prior to hospitalization 2 weeks ago were he had a paracentesis done for ascites and was informed there was concern for cancer.    Patient with massive PE, Acute DVT RLE,   BNP: 993, TROP negative  TTE on 08/25/23 with Left ventricular ejection fraction, 75%. Hyperdynamic global left ventricular systolic function, Grade I diastolic dysfunction).   Right ventricular volume overload, oderately enlarged right ventricle and severely reduced RV systolic   function; there appears to be McConnells sign, TAPSE 1.45 cm.    S/P  INARI: mechanical  thrombectomy of saddle PE via RFV on 08/26/23  post thrombectomy patient was  on heparin gtt, transitioned to Eliqusi today  RFV low flow stasis suture removed today by author, right groin benign with no bleeding/hematoma.    Evaluated the patient at bedside, patient lying in bed, on RA, in no distress, DENIES ha, dizziness, SOB, CP    PAST MEDICAL & SURGICAL HISTORY:      PAST MEDICAL & SURGICAL HISTORY:  HTN (hypertension)      Diabetes      Hydronephrosis, left      Ascites        History of cholecystectomy      H/O arthroscopy of right knee          PREVIOUS DIAGNOSTIC TESTING:      ECHO  FINDINGS:      < from: TTE Echo Complete w/ Contrast w/ Doppler (08.25.23 @ 09:00) >   1. Left ventricular ejection fraction, by visual estimation, is>75%.   2. Hyperdynamic global left ventricular systolic function.   3. Spectral Doppler shows impaired relaxation pattern of left   ventricular myocardial filling (Grade I diastolic dysfunction).   4. Right ventricular volume overload.   5. Moderately enlarged right ventricle and severely reduced RV systolic   function; there appears to be McConnells sign, TAPSE 1.45 cm.   6. The left atrium is normal in size.   7. Trace mitral valve regurgitation.   8. Mild thickening of the anterior and posterior mitral valve leaflets.   9. Mild tricuspid regurgitation.  10. Mild pulmonic valve regurgitation.  11. Moderate ascites.  12. There is no evidence of pericardial effusion.  13. Endocardial visualization was enhanced with intravenous echo contrast.  14. There are no prior studies on this patient for comparison purposes.      STRESS  FINDINGS: NA      Allergies    No Known Allergies    Intolerances        MEDICATIONS  (STANDING):  apixaban 10 milliGRAM(s) Oral two times a day  dextrose 5%. 1000 milliLiter(s) (50 mL/Hr) IV Continuous <Continuous>  dextrose 5%. 1000 milliLiter(s) (100 mL/Hr) IV Continuous <Continuous>  dextrose 50% Injectable 12.5 Gram(s) IV Push once  dextrose 50% Injectable 25 Gram(s) IV Push once  dextrose 50% Injectable 25 Gram(s) IV Push once  glucagon  Injectable 1 milliGRAM(s) IntraMuscular once  insulin glargine Injectable (LANTUS) 10 Unit(s) SubCutaneous at bedtime  insulin lispro (ADMELOG) corrective regimen sliding scale   SubCutaneous three times a day before meals  insulin lispro (ADMELOG) corrective regimen sliding scale   SubCutaneous at bedtime  sodium chloride 1 Gram(s) Oral three times a day    MEDICATIONS  (PRN):  dextrose Oral Gel 15 Gram(s) Oral once PRN Blood Glucose LESS THAN 70 milliGRAM(s)/deciliter        REVIEW OF SYSTEMS:  CONSTITUTIONAL: No fever, weight loss, or fatigue  EYES: No eye pain, visual disturbances, or discharge  ENMT:  No difficulty hearing, tinnitus, vertigo; No sinus or throat pain  NECK: No pain or stiffness  RESPIRATORY: No cough, wheezing, chills or hemoptysis; No Shortness of Breath  CARDIOVASCULAR: No chest pain, palpitations, passing out, dizziness, or leg swelling, Right groin benign  GASTROINTESTINAL: No abdominal or epigastric pain. No nausea, vomiting, or hematemesis; No diarrhea or constipation. No melena or hematochezia.  GENITOURINARY: No dysuria, frequency, hematuria, or incontinence  NEUROLOGICAL: No headaches, memory loss, loss of strength, numbness, or tremors  SKIN: No itching, burning, rashes, or lesions   LYMPH Nodes: No enlarged glands  ENDOCRINE: No heat or cold intolerance; No hair loss  MUSCULOSKELETAL: No joint pain or swelling; No muscle, back, or extremity pain  PSYCHIATRIC: No depression, anxiety, mood swings, or difficulty sleeping  HEME/LYMPH: No easy bruising, or bleeding gums  ALLERY AND IMMUNOLOGIC: No hives or eczema	    Vital Signs Last 24 Hrs  T(C): 36.7 (26 Aug 2023 07:45), Max: 37 (25 Aug 2023 21:00)  T(F): 98.1 (26 Aug 2023 07:45), Max: 98.6 (25 Aug 2023 21:00)  HR: 100 (26 Aug 2023 08:00) (93 - 101)  BP: 119/87 (26 Aug 2023 08:00) (100/70 - 151/89)  BP(mean): 94 (26 Aug 2023 08:00) (62 - 94)  RR: 18 (26 Aug 2023 08:00) (15 - 22)  SpO2: 98% (26 Aug 2023 08:00) (94% - 100%)    Parameters below as of 26 Aug 2023 08:00  Patient On (Oxygen Delivery Method): room air        Daily     Daily     I&O's Detail    25 Aug 2023 07:01  -  26 Aug 2023 07:00  --------------------------------------------------------  IN:    Heparin Infusion: 56 mL    Heparin Infusion: 60 mL    Oral Fluid: 560 mL    Sodium Chloride 0.9% Bolus: 250 mL  Total IN: 926 mL    OUT:    Indwelling Catheter - Urethral (mL): 550 mL    Nephrostomy Tube (mL): 550 mL  Total OUT: 1100 mL    Total NET: -174 mL          PHYSICAL EXAM:  Constitutional: A & O x 3  HEENT:   Normal oral mucosa, PERRL, EOMI	  Cardiovascular: Normal S1 S2, No JVD, No murmurs, No edema  Respiratory: Lungs clear to auscultation	  Gastrointestinal:  Soft, Non-tender, + BS	  Skin: No rashes, No ecchymoses, No cyanosis  Neurologic: Non-focal  Extremities: Normal range of motion, No clubbing, cyanosis or edema  Vascular: Peripheral pulses palpable 2+ bilaterally, Right groin s/p low flow stasis suture removal today, RIGHT GROIN benign   :  nephrostomy tube draining well ( light pink urine ) Faria with tea colored urine    INTERPRETATION OF TELEMETRY:    ECG: ST, No St elevations, non specific ST/T abnoramlity    LABS:                        10.9   7.05  )-----------( 270      ( 26 Aug 2023 08:55 )             33.7     08-26    131<L>  |  96  |  11.9  ----------------------------<  146<H>  4.2   |  22.0  |  0.83    Ca    8.1<L>      26 Aug 2023 08:55    TPro  5.5<L>  /  Alb  2.7<L>  /  TBili  0.4  /  DBili  x   /  AST  22  /  ALT  13  /  AlkPhos  96  08-26    CARDIAC MARKERS ( 25 Aug 2023 03:15 )  x     / <0.01 ng/mL / x     / x     / x          PT/INR - ( 25 Aug 2023 04:05 )   PT: 10.9 sec;   INR: 0.98 ratio         PTT - ( 25 Aug 2023 22:36 )  PTT:101.1 sec  Urinalysis Basic - ( 26 Aug 2023 08:55 )    Color: x / Appearance: x / SG: x / pH: x  Gluc: 146 mg/dL / Ketone: x  / Bili: x / Urobili: x   Blood: x / Protein: x / Nitrite: x   Leuk Esterase: x / RBC: x / WBC x   Sq Epi: x / Non Sq Epi: x / Bacteria: x      I&O's Summary    25 Aug 2023 07:01  -  26 Aug 2023 07:00  --------------------------------------------------------  IN: 926 mL / OUT: 1100 mL / NET: -174 mL      BNP    RADIOLOGY & ADDITIONAL STUDIES:

## 2023-08-26 NOTE — PROCEDURE NOTE - ADDITIONAL PROCEDURE DETAILS
RFV LOW FLOW STASIS SUTURE REMOVED, MANUAL COMPRESSION APPLIED FOR 20 MINUTES AND HEMOSTASIS AVHIEVED  RT GROIN WITH NO BLEEDING/HEMATOMA, NV STATUS INTACT  Post right groin low flow stasis suture removal    VSS/NV check/ right groin check q15 minutes x4, q30 minutes x3, q1 hourly x2, then per unit protocol  Keep right groin straight with HOB < 30 DEGREE FOR 2 HOURS, hob > 30 degree after 2 hoirs, patient can sit in bed after 3 hours, OOB adter 4 hours

## 2023-08-26 NOTE — PROGRESS NOTE ADULT - ASSESSMENT
66 y/o male with PMHx of HTN, ureteral mass with hydronephrosis s/p left nephrostomy tube on 8/2/23, and recent discharge from [facility] 1day prior to arrival s/p 20 Fr faria and ureteral stent, came in for acute onset of shortness of breath. Patient stated he had just arrived at home when all of a sudden he felt like he couldn't breath or couldn't get enough air into his lungs. Patient also stated his heart felt like it was pounding in his chest. In ED today, patient was initially tachycardic to 103 with a bp of 148/100 and SpO2 of 98% on 3.5L NC. He had a CT angio Chest performed which showed a massive PE.   Patient recently been hospitalized 2 weeks ago here at [facility] for malignancy workup and management of hydronephrosis that required nephrostomy tube placement. Patient had also traveled to [facility] prior to hospitalization 2 weeks ago were he had a paracentesis done for ascites and was informed there was concern for cancer.    Patient with massive PE, Acute DVT RLE,   - sPESI Score: 3 indicating High Risk  BNP: 993, TROP negative  TTE on 08/25/23 with Left ventricular ejection fraction, 75%. Hyperdynamic global left ventricular systolic function, Grade I diastolic dysfunction).   Right ventricular volume overload, oderately enlarged right ventricle and severely reduced RV systolic   function; there appears to be McConnells sign, TAPSE 1.45 cm.    S/P  INARI: mechanical  thrombectomy of saddle PE via RFV on 08/26/23  post thrombectomy patient was  on heparin gtt, transitioned to Eliqusi today  RFV low flow stasis suture removed today by author, right groin benign with no bleeding/hematoma.    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 García  -Cardiology will follow    TYPE 2DM  - Blood Glucose monitoring with sliding scale Insulin  - Patient on basal and  bolus insulin  - F/U A1C  -Management per primary team    HTN  - Hold home antihypertensive for now in setting of pt s/p obstructive shock from PE  -Trend vitals  -C/W Dash siet    Hyponatremia, Na: 131 today, improving  -On  Salt tablets  - Monitor SNa; consider further work up if worsening  Ureteral Mass, with left nephrostomy tube ( 8/1/23 )excisional biopsy ureter mass placement stent   left nephrostomy tube draining well ( light pink urine )   faria catheter in place light pink urine in bag good output.    - Maintain Faria  - Urology follow up  -Plan for inpatient Hem -onc consult per primary team      Dispo: Home once cleared from vascylar/urology perspective/Hem consult pending.    Discussed with Attending DR Valdez       68 y/o male with PMHx of HTN, ureteral mass with hydronephrosis s/p left nephrostomy tube on 8/2/23, and recent discharge from [facility] 1day prior to arrival s/p 20 Fr faria and ureteral stent, came in for acute onset of shortness of breath. Patient stated he had just arrived at home when all of a sudden he felt like he couldn't breath or couldn't get enough air into his lungs. Patient also stated his heart felt like it was pounding in his chest. In ED today, patient was initially tachycardic to 103 with a bp of 148/100 and SpO2 of 98% on 3.5L NC. He had a CT angio Chest performed which showed a massive PE.   Patient recently been hospitalized 2 weeks ago here at [facility] for malignancy workup and management of hydronephrosis that required nephrostomy tube placement. Patient had also traveled to [facility] prior to hospitalization 2 weeks ago were he had a paracentesis done for ascites and was informed there was concern for cancer.    Patient with massive PE, Acute DVT RLE,   - sPESI Score: 3 indicating High Risk  BNP: 993, TROP negative  TTE on 08/25/23 with Left ventricular ejection fraction, 75%. Hyperdynamic global left ventricular systolic function, Grade I diastolic dysfunction).   Right ventricular volume overload, oderately enlarged right ventricle and severely reduced RV systolic   function; there appears to be McConnells sign, TAPSE 1.45 cm.    S/P  INARI: mechanical  thrombectomy of saddle PE via RFV on 08/26/23  post thrombectomy patient was  on heparin gtt, transitioned to Eliqusi today  RFV low flow stasis suture removed today by author, right groin benign with no bleeding/hematoma.    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   -s/p INARI- Mechanical thrombectomy  -Patient on RA, not hypoxic  -Hemodynamically stable  -S/P INARI access Right groin site benign, no bleeding/hematoma, NV Status intact  -Supplemental o2 PRN, Post INARI, patient not ambulatory yet, monitor o2sat on ambulation  -F/U LIMITED TTE  ordered to evaluate psot INARI to characterize RV/LV  Patient  s/p Heparin drip full AC normogram, transitioned to Eliquis today  C/W ELIQUIS 10MG PO BID X 7DAYS, FOLLOWED BY Eliquis 5mg po bid   Trend CBC DAILY  Bleeding precautions  -Trend vitals  -Cardiac rehab info provided/referral and communication to cardiac rehab completed  -F/U outpt in 1-2 weeks with Cardiologist Dr. García  -Cardiology will sign off IF f/u  TTE with no acute concerns    Problem #2     TYPE 2DM    PLAN:   - Blood Glucose monitoring with sliding scale Insulin  - Patient on basal and  bolus insulin  - F/U A1C  -Management per primary team    Problem # 3   HTN    PLAN  - Hold home antihypertensive for now in setting of pt s/p obstructive shock from PE  -Trend vitals  -C/W Dash Diet    problem # 3   Hyponatremia, Na: 131 today, improving    PLAN  -On  Salt tablets  - Monitor SNa; consider further work up if worsening  Ureteral Mass, with left nephrostomy tube ( 8/1/23 )excisional biopsy ureter mass placement stent   left nephrostomy tube draining well ( light pink urine )   faria catheter in place light pink urine in bag good output.    - Maintain Faria  - Urology follow up  -Plan for inpatient Hem -onc consult per primary team    VTE: Eliquis      Dispo: Home once cleared from vascUlar/urology perspective/Hem consult pending.    Discussed with Attending

## 2023-08-26 NOTE — PROGRESS NOTE ADULT - SUBJECTIVE AND OBJECTIVE BOX
[facility] Division of Hospital Medicine    Chief Complaint:      SUBJECTIVE / OVERNIGHT EVENTS:    Patient seen and examined at bedside, taken for mechanical thrombectomy overnight with no complications noted. Patient currently afebrile, saturating well on RA, dressing removed by cardiology this AM at groin access site with minimal bleeding noted, additional dressing replaced with direction to remain flat for 3 hours. Transitioned to Eliquis and heparin stopped this AM, will continue to monitor. Will reach out to Heme/Onc in regards to utility of being seen while inpatient vs. awaiting tissue diagnosis for future treatment planning.      MEDICATIONS  (STANDING):  apixaban 10 milliGRAM(s) Oral two times a day  dextrose 5%. 1000 milliLiter(s) (50 mL/Hr) IV Continuous <Continuous>  dextrose 5%. 1000 milliLiter(s) (100 mL/Hr) IV Continuous <Continuous>  dextrose 50% Injectable 25 Gram(s) IV Push once  dextrose 50% Injectable 12.5 Gram(s) IV Push once  dextrose 50% Injectable 25 Gram(s) IV Push once  glucagon  Injectable 1 milliGRAM(s) IntraMuscular once  insulin glargine Injectable (LANTUS) 10 Unit(s) SubCutaneous at bedtime  insulin lispro (ADMELOG) corrective regimen sliding scale   SubCutaneous three times a day before meals  insulin lispro (ADMELOG) corrective regimen sliding scale   SubCutaneous at bedtime  sodium chloride 1 Gram(s) Oral three times a day    MEDICATIONS  (PRN):  dextrose Oral Gel 15 Gram(s) Oral once PRN Blood Glucose LESS THAN 70 milliGRAM(s)/deciliter        I&O's Summary    25 Aug 2023 07:01  -  26 Aug 2023 07:00  --------------------------------------------------------  IN: 926 mL / OUT: 1100 mL / NET: -174 mL        PHYSICAL EXAM:  Vital Signs Last 24 Hrs  T(C): 36.7 (26 Aug 2023 07:45), Max: 37 (25 Aug 2023 21:00)  T(F): 98.1 (26 Aug 2023 07:45), Max: 98.6 (25 Aug 2023 21:00)  HR: 104 (26 Aug 2023 12:30) (93 - 105)  BP: 108/81 (26 Aug 2023 12:30) (100/70 - 151/89)  BP(mean): 86 (26 Aug 2023 12:30) (62 - 100)  RR: 18 (26 Aug 2023 12:30) (15 - 18)  SpO2: 96% (26 Aug 2023 12:30) (94% - 100%)    Parameters below as of 26 Aug 2023 12:30  Patient On (Oxygen Delivery Method): room air      General: Middle aged male laying in bed, comfortable   HEENT: saturating well on RA   NECK:  Supple  CVS: regular rate and rhythm S1 S2  RESP:  CTAB   GI:  Soft protuberant nontender BS+  : Nation with yellow urine in place   MSK:  gait deferred, strength 5/5 in all extremities  CNS:  AOX3. No gross focal or global deficit appreciated  INTEG:  warm dry skin  PSYCH: Fair mood    LABS:                        10.9   7.05  )-----------( 270      ( 26 Aug 2023 08:55 )             33.7     08-26    131<L>  |  96  |  11.9  ----------------------------<  146<H>  4.2   |  22.0  |  0.83    Ca    8.1<L>      26 Aug 2023 08:55    TPro  5.5<L>  /  Alb  2.7<L>  /  TBili  0.4  /  DBili  x   /  AST  22  /  ALT  13  /  AlkPhos  96  08-26    PT/INR - ( 25 Aug 2023 04:05 )   PT: 10.9 sec;   INR: 0.98 ratio         PTT - ( 25 Aug 2023 22:36 )  PTT:101.1 sec  CARDIAC MARKERS ( 25 Aug 2023 03:15 )  x     / <0.01 ng/mL / x     / x     / x          Urinalysis Basic - ( 26 Aug 2023 08:55 )    Color: x / Appearance: x / SG: x / pH: x  Gluc: 146 mg/dL / Ketone: x  / Bili: x / Urobili: x   Blood: x / Protein: x / Nitrite: x   Leuk Esterase: x / RBC: x / WBC x   Sq Epi: x / Non Sq Epi: x / Bacteria: x        CAPILLARY BLOOD GLUCOSE      POCT Blood Glucose.: 189 mg/dL (26 Aug 2023 12:35)  POCT Blood Glucose.: 150 mg/dL (26 Aug 2023 08:49)  POCT Blood Glucose.: 231 mg/dL (25 Aug 2023 21:28)  POCT Blood Glucose.: 161 mg/dL (25 Aug 2023 17:15)        RADIOLOGY & ADDITIONAL TESTS:  Results Reviewed:   Imaging Personally Reviewed:  Electrocardiogram Personally Reviewed:

## 2023-08-27 ENCOUNTER — TRANSCRIPTION ENCOUNTER (OUTPATIENT)
Age: 68
End: 2023-08-27

## 2023-08-27 LAB
ANION GAP SERPL CALC-SCNC: 12 MMOL/L — SIGNIFICANT CHANGE UP (ref 5–17)
ANION GAP SERPL CALC-SCNC: 13 MMOL/L — SIGNIFICANT CHANGE UP (ref 5–17)
ANION GAP SERPL CALC-SCNC: 9 MMOL/L — SIGNIFICANT CHANGE UP (ref 5–17)
BUN SERPL-MCNC: 10.3 MG/DL — SIGNIFICANT CHANGE UP (ref 8–20)
BUN SERPL-MCNC: 11.2 MG/DL — SIGNIFICANT CHANGE UP (ref 8–20)
BUN SERPL-MCNC: 13.2 MG/DL — SIGNIFICANT CHANGE UP (ref 8–20)
CALCIUM SERPL-MCNC: 8 MG/DL — LOW (ref 8.4–10.5)
CALCIUM SERPL-MCNC: 8 MG/DL — LOW (ref 8.4–10.5)
CALCIUM SERPL-MCNC: 8.2 MG/DL — LOW (ref 8.4–10.5)
CHLORIDE SERPL-SCNC: 97 MMOL/L — SIGNIFICANT CHANGE UP (ref 96–108)
CHLORIDE SERPL-SCNC: 99 MMOL/L — SIGNIFICANT CHANGE UP (ref 96–108)
CHLORIDE SERPL-SCNC: 99 MMOL/L — SIGNIFICANT CHANGE UP (ref 96–108)
CO2 SERPL-SCNC: 22 MMOL/L — SIGNIFICANT CHANGE UP (ref 22–29)
CO2 SERPL-SCNC: 22 MMOL/L — SIGNIFICANT CHANGE UP (ref 22–29)
CO2 SERPL-SCNC: 24 MMOL/L — SIGNIFICANT CHANGE UP (ref 22–29)
CREAT SERPL-MCNC: 0.73 MG/DL — SIGNIFICANT CHANGE UP (ref 0.5–1.3)
CREAT SERPL-MCNC: 0.79 MG/DL — SIGNIFICANT CHANGE UP (ref 0.5–1.3)
CREAT SERPL-MCNC: 0.85 MG/DL — SIGNIFICANT CHANGE UP (ref 0.5–1.3)
EGFR: 101 ML/MIN/1.73M2 — SIGNIFICANT CHANGE UP
EGFR: 96 ML/MIN/1.73M2 — SIGNIFICANT CHANGE UP
EGFR: 99 ML/MIN/1.73M2 — SIGNIFICANT CHANGE UP
GLUCOSE BLDC GLUCOMTR-MCNC: 176 MG/DL — HIGH (ref 70–99)
GLUCOSE BLDC GLUCOMTR-MCNC: 180 MG/DL — HIGH (ref 70–99)
GLUCOSE BLDC GLUCOMTR-MCNC: 244 MG/DL — HIGH (ref 70–99)
GLUCOSE SERPL-MCNC: 149 MG/DL — HIGH (ref 70–99)
GLUCOSE SERPL-MCNC: 166 MG/DL — HIGH (ref 70–99)
GLUCOSE SERPL-MCNC: 172 MG/DL — HIGH (ref 70–99)
HCT VFR BLD CALC: 33.8 % — LOW (ref 39–50)
HCV AB S/CO SERPL IA: 0.13 S/CO — SIGNIFICANT CHANGE UP (ref 0–0.99)
HCV AB SERPL-IMP: SIGNIFICANT CHANGE UP
HGB BLD-MCNC: 11.4 G/DL — LOW (ref 13–17)
MAGNESIUM SERPL-MCNC: 2 MG/DL — SIGNIFICANT CHANGE UP (ref 1.6–2.6)
MAGNESIUM SERPL-MCNC: 2 MG/DL — SIGNIFICANT CHANGE UP (ref 1.6–2.6)
MCHC RBC-ENTMCNC: 29 PG — SIGNIFICANT CHANGE UP (ref 27–34)
MCHC RBC-ENTMCNC: 33.7 GM/DL — SIGNIFICANT CHANGE UP (ref 32–36)
MCV RBC AUTO: 86 FL — SIGNIFICANT CHANGE UP (ref 80–100)
PHOSPHATE SERPL-MCNC: 3.2 MG/DL — SIGNIFICANT CHANGE UP (ref 2.4–4.7)
PHOSPHATE SERPL-MCNC: 3.4 MG/DL — SIGNIFICANT CHANGE UP (ref 2.4–4.7)
PLATELET # BLD AUTO: 266 K/UL — SIGNIFICANT CHANGE UP (ref 150–400)
POTASSIUM SERPL-MCNC: 3.9 MMOL/L — SIGNIFICANT CHANGE UP (ref 3.5–5.3)
POTASSIUM SERPL-MCNC: 4 MMOL/L — SIGNIFICANT CHANGE UP (ref 3.5–5.3)
POTASSIUM SERPL-MCNC: 4.3 MMOL/L — SIGNIFICANT CHANGE UP (ref 3.5–5.3)
POTASSIUM SERPL-SCNC: 3.9 MMOL/L — SIGNIFICANT CHANGE UP (ref 3.5–5.3)
POTASSIUM SERPL-SCNC: 4 MMOL/L — SIGNIFICANT CHANGE UP (ref 3.5–5.3)
POTASSIUM SERPL-SCNC: 4.3 MMOL/L — SIGNIFICANT CHANGE UP (ref 3.5–5.3)
RBC # BLD: 3.93 M/UL — LOW (ref 4.2–5.8)
RBC # FLD: 13 % — SIGNIFICANT CHANGE UP (ref 10.3–14.5)
SODIUM SERPL-SCNC: 130 MMOL/L — LOW (ref 135–145)
SODIUM SERPL-SCNC: 133 MMOL/L — LOW (ref 135–145)
SODIUM SERPL-SCNC: 134 MMOL/L — LOW (ref 135–145)
TROPONIN T SERPL-MCNC: <0.01 NG/ML — SIGNIFICANT CHANGE UP (ref 0–0.06)
WBC # BLD: 6.53 K/UL — SIGNIFICANT CHANGE UP (ref 3.8–10.5)
WBC # FLD AUTO: 6.53 K/UL — SIGNIFICANT CHANGE UP (ref 3.8–10.5)

## 2023-08-27 PROCEDURE — 93970 EXTREMITY STUDY: CPT | Mod: 26

## 2023-08-27 PROCEDURE — 99233 SBSQ HOSP IP/OBS HIGH 50: CPT

## 2023-08-27 PROCEDURE — 76705 ECHO EXAM OF ABDOMEN: CPT | Mod: 26

## 2023-08-27 PROCEDURE — 93308 TTE F-UP OR LMTD: CPT | Mod: 26

## 2023-08-27 RX ORDER — SODIUM CHLORIDE 9 MG/ML
1 INJECTION INTRAMUSCULAR; INTRAVENOUS; SUBCUTANEOUS
Qty: 60 | Refills: 0
Start: 2023-08-27 | End: 2023-09-25

## 2023-08-27 RX ORDER — INSULIN GLARGINE 100 [IU]/ML
10 INJECTION, SOLUTION SUBCUTANEOUS
Qty: 1 | Refills: 0
Start: 2023-08-27 | End: 2023-09-25

## 2023-08-27 RX ORDER — APIXABAN 2.5 MG/1
1 TABLET, FILM COATED ORAL
Qty: 70 | Refills: 0
Start: 2023-08-27 | End: 2023-09-25

## 2023-08-27 RX ADMIN — INSULIN GLARGINE 10 UNIT(S): 100 INJECTION, SOLUTION SUBCUTANEOUS at 21:24

## 2023-08-27 RX ADMIN — Medication 2: at 16:53

## 2023-08-27 RX ADMIN — SODIUM CHLORIDE 1 GRAM(S): 9 INJECTION INTRAMUSCULAR; INTRAVENOUS; SUBCUTANEOUS at 05:46

## 2023-08-27 RX ADMIN — Medication 650 MILLIGRAM(S): at 04:32

## 2023-08-27 RX ADMIN — SODIUM CHLORIDE 1 GRAM(S): 9 INJECTION INTRAMUSCULAR; INTRAVENOUS; SUBCUTANEOUS at 21:22

## 2023-08-27 RX ADMIN — SODIUM CHLORIDE 1 GRAM(S): 9 INJECTION INTRAMUSCULAR; INTRAVENOUS; SUBCUTANEOUS at 14:42

## 2023-08-27 RX ADMIN — APIXABAN 10 MILLIGRAM(S): 2.5 TABLET, FILM COATED ORAL at 05:46

## 2023-08-27 RX ADMIN — APIXABAN 10 MILLIGRAM(S): 2.5 TABLET, FILM COATED ORAL at 17:44

## 2023-08-27 RX ADMIN — Medication 1: at 11:53

## 2023-08-27 NOTE — DISCHARGE NOTE PROVIDER - NSDCFUSCHEDAPPT_GEN_ALL_CORE_FT
Светлана Briseno  [facility]  [address]  Scheduled Appointment: 09/11/2023

## 2023-08-27 NOTE — DISCHARGE NOTE PROVIDER - CARE PROVIDERS DIRECT ADDRESSES
,DirectAddress_Unknown,[email or web address],DirectAddress_Unknown

## 2023-08-27 NOTE — CHART NOTE - NSCHARTNOTEFT_GEN_A_CORE
Called by RN for pt c/o chest pain    Pt seen and examined at bedside, utilizing  #[id number]. Pt states pain started about an hour ago, located just inferior to left breast. Pain is a mild burning pain and non-radiating. Took tylenol and its helped. Pain is described as positional. When turning onto right side, the pain goes away. Pt denies any palpitations, SOB, nausea, vomiting, dizziness, or any other complaints at this time.     Vital Signs   T(F): 97.8 (27 Aug 2023 00:02), Max: 98.6 (26 Aug 2023 00:56)  HR: 107 (26 Aug 2023 22:00) (93 - 108)  BP: 116/80 (26 Aug 2023 22:00) (100/72 - 125/88)  RR: 18 (26 Aug 2023 22:00) (16 - 18)  SpO2: 97% room air (26 Aug 2023 22:00) (95% - 98%)    PHYSICAL EXAM:  GENERAL: Pt lying in bed comfortably in NAD  CHEST/LUNG: Unlabored respirations. Chest wall nontender to palpation. Lungs clear to auscultation bilaterally; No rales, rhonchi or wheezing.   HEART: +S1, S2, Regular rate and rhythm   EXTREMITIES:  2+ Peripheral Pulses, brisk capillary refill. No clubbing, cyanosis, or edema.     EKG repeated, NSR HR 99 bpm  Electrolytes and trop pending  RN to contact PA if pt develops any new or worsening S/S Called by RN for pt c/o chest pain    Pt seen and examined at bedside, utilizing  #[id number]. Pt states pain started about an hour ago, located just inferior to left breast. Pain is a mild burning pain and non-radiating. Took tylenol and its helped. Pain is described as positional. When turning onto right side, the pain goes away. Pt denies any palpitations, SOB, nausea, vomiting, dizziness, or any other complaints at this time.     Vital Signs   T(F): 97.8 (27 Aug 2023 00:02), Max: 98.6 (26 Aug 2023 00:56)  HR: 107 (26 Aug 2023 22:00) (93 - 108)  BP: 116/80 (26 Aug 2023 22:00) (100/72 - 125/88)  RR: 18 (26 Aug 2023 22:00) (16 - 18)  SpO2: 97% room air (26 Aug 2023 22:00) (95% - 98%)    PHYSICAL EXAM:  GENERAL: Pt lying in bed comfortably in NAD  CHEST/LUNG: Unlabored respirations. Chest wall nontender to palpation. Lungs clear to auscultation bilaterally; No rales, rhonchi or wheezing.   HEART: +S1, S2, Regular rate and rhythm   EXTREMITIES:  2+ Peripheral Pulses, brisk capillary refill. No clubbing, cyanosis, or edema.     EKG repeated, NSR HR 99 bpm  Electrolytes stable, trop <0.01  Pleuritic chest pain likely 2/2 PE with recent thrombectomy  Will trend trops and cont to monitor   RN to contact PA if pt develops any new or worsening S/S

## 2023-08-27 NOTE — DISCHARGE NOTE NURSING/CASE MANAGEMENT/SOCIAL WORK - NSSCNAMETXT_GEN_ALL_CORE
[facility] (formerly [facility])   [address] [place] [address]

## 2023-08-27 NOTE — PROGRESS NOTE ADULT - ASSESSMENT
67 year old male with PMH HTN, T2DM, Ascites, Left hydronephrosis and recently admitted for Left ureteroscopy and biopsy return within 24 hours of discharge with dyspnea.   Hypoxic on room air.  CT chest with saddle embolus. TTE with RV strain and Stanford signs. Taken for Thrombectomy 8/25 with resolution of symptoms, dressing removed from groin site with mild bleeding noted. Continue to monitor.        #Acute PE, saddle.  improved, now saturating well on RA   - Ambulated without need for O2  - In setting of recent procedure / active malignancy   - Heparin infusion DC'd, now transitioned to Eliquis   - LE dopplers shows RLE DVT   - PERT team evaluated, s/p Mechanical thrombectomy 8/25   - cleared for DC per Cardiology, repeat TTE shows improvement in RV function     #Abdominal distension/discomfort   likely in setting of peritoneal carcinomatosis and ascites   - interval increase of ascites noted on CT Abd on admission   - Will obtain US Abd and plan for possible paracentesis if amenable     #T2DM  - Blood Glucose monitoring with sliding scale Insulin  - Glargine 10U and Premeal Lispro 3U  - A1c 8.9 on 8/22    #HTN  - Hold home antihypertensive for now.     Hyponatremia  - improving , Na 133 this AM,   - noted Na 130 8/1 (Na 135 8/24)   - Salt tablets 1 g TID, will continue for now, consider urine Na as for continuation of salt tablets    Ureteral Mass  - Maintain Nation  - Urology follow up outpatient   - will need Heme/Onc outpatient follow up when tissue diagnosis made     DVT prophylaxis: Eliquis   Diet: Regular   Dispo: Pending clinical improvement, US Abd need for paracentesis vs. diuresis

## 2023-08-27 NOTE — DISCHARGE NOTE NURSING/CASE MANAGEMENT/SOCIAL WORK - PATIENT PORTAL LINK FT
You can access the FollowMyHealth Patient Portal offered by [facility] by registering at the following website: [email or web address]. By joining [employer]’s FollowMyHealth portal, you will also be able to view your health information using other applications (apps) compatible with our system.

## 2023-08-27 NOTE — PROGRESS NOTE ADULT - SUBJECTIVE AND OBJECTIVE BOX
[facility] Division of Hospital Medicine    Chief Complaint:      SUBJECTIVE / OVERNIGHT EVENTS:    Patient seen and examined at bedside, overnight patient complained of mild pain under his L. breast, most consistent with MSK pain. cleared for DC by cardiology and was planned for DC however patient states he feels an increase in distension of his abdomen which started becoming uncomfortable in the afternoon. Patient has known ascites on CT likely in setting of peritoneal carcinomatosis, will order Abd US for possible paracentesis and hold DC.     MEDICATIONS  (STANDING):  apixaban 10 milliGRAM(s) Oral two times a day  dextrose 5%. 1000 milliLiter(s) (50 mL/Hr) IV Continuous <Continuous>  dextrose 5%. 1000 milliLiter(s) (100 mL/Hr) IV Continuous <Continuous>  dextrose 50% Injectable 25 Gram(s) IV Push once  dextrose 50% Injectable 12.5 Gram(s) IV Push once  dextrose 50% Injectable 25 Gram(s) IV Push once  glucagon  Injectable 1 milliGRAM(s) IntraMuscular once  insulin glargine Injectable (LANTUS) 10 Unit(s) SubCutaneous at bedtime  insulin lispro (ADMELOG) corrective regimen sliding scale   SubCutaneous three times a day before meals  insulin lispro (ADMELOG) corrective regimen sliding scale   SubCutaneous at bedtime  sodium chloride 1 Gram(s) Oral three times a day    MEDICATIONS  (PRN):  dextrose Oral Gel 15 Gram(s) Oral once PRN Blood Glucose LESS THAN 70 milliGRAM(s)/deciliter        I&O's Summary    26 Aug 2023 07:01  -  27 Aug 2023 07:00  --------------------------------------------------------  IN: 0 mL / OUT: 1400 mL / NET: -1400 mL    27 Aug 2023 07:01  -  27 Aug 2023 15:13  --------------------------------------------------------  IN: 0 mL / OUT: 350 mL / NET: -350 mL        PHYSICAL EXAM:  Vital Signs Last 24 Hrs  T(C): 37.2 (27 Aug 2023 13:00), Max: 37.2 (27 Aug 2023 13:00)  T(F): 98.9 (27 Aug 2023 13:00), Max: 98.9 (27 Aug 2023 13:00)  HR: 118 (27 Aug 2023 13:00) (101 - 118)  BP: 141/101 (27 Aug 2023 13:00) (99/72 - 141/101)  BP(mean): 111 (27 Aug 2023 13:00) (77 - 111)  RR: 18 (27 Aug 2023 13:00) (16 - 18)  SpO2: 96% (27 Aug 2023 13:00) (95% - 100%)    Parameters below as of 27 Aug 2023 13:00  Patient On (Oxygen Delivery Method): room air      General: Middle aged male laying in bed, comfortable   HEENT: saturating well on RA   NECK:  Supple  CVS: rapid rate and regular rhythm S1 S2  RESP:  CTAB   GI:  Soft protuberant dull to percussion, fluid wave positive, nontender BS+  : Nation with yellow urine in place   MSK:  gait deferred, strength 5/5 in all extremities  CNS:  AOX3. No gross focal or global deficit appreciated  INTEG:  warm dry skin  PSYCH: Fair mood      LABS:                        11.4   6.53  )-----------( 266      ( 27 Aug 2023 06:48 )             33.8     08-27    133<L>  |  99  |  11.2  ----------------------------<  149<H>  3.9   |  22.0  |  0.73    Ca    8.0<L>      27 Aug 2023 06:48  Phos  3.4     08-27  Mg     2.0     08-27    TPro  5.5<L>  /  Alb  2.7<L>  /  TBili  0.4  /  DBili  x   /  AST  22  /  ALT  13  /  AlkPhos  96  08-26    PTT - ( 25 Aug 2023 22:36 )  PTT:101.1 sec  CARDIAC MARKERS ( 27 Aug 2023 12:00 )  x     / <0.01 ng/mL / x     / x     / x      CARDIAC MARKERS ( 27 Aug 2023 06:37 )  x     / <0.01 ng/mL / x     / x     / x      CARDIAC MARKERS ( 27 Aug 2023 00:56 )  x     / <0.01 ng/mL / x     / x     / x          Urinalysis Basic - ( 27 Aug 2023 06:48 )    Color: x / Appearance: x / SG: x / pH: x  Gluc: 149 mg/dL / Ketone: x  / Bili: x / Urobili: x   Blood: x / Protein: x / Nitrite: x   Leuk Esterase: x / RBC: x / WBC x   Sq Epi: x / Non Sq Epi: x / Bacteria: x        CAPILLARY BLOOD GLUCOSE      POCT Blood Glucose.: 176 mg/dL (27 Aug 2023 11:40)  POCT Blood Glucose.: 252 mg/dL (26 Aug 2023 21:40)  POCT Blood Glucose.: 212 mg/dL (26 Aug 2023 18:10)        RADIOLOGY & ADDITIONAL TESTS:  Results Reviewed:   Imaging Personally Reviewed:  Electrocardiogram Personally Reviewed:

## 2023-08-27 NOTE — PROGRESS NOTE ADULT - TIME BILLING
Discussion with patient and son in regards to PE and need for continued anticoagulation going forward, planning for follow appointments with outside providers.
Initial plan for discharge however changed upon patient feeling increase in abdominal distension and discomfort, known ascites likely from metastatic involvement, remains sinus tachycardia in light of PE and remains on Eliquis loading

## 2023-08-27 NOTE — DISCHARGE NOTE PROVIDER - NSDCHHNEEDSERVICEOTHER_GEN_ALL_CORE_FT
Nursing services, Nation flush / drain as needed  Nursing services, Nation care flush / drain as needed  Nursing services, Nation care flush daily / drain as needed

## 2023-08-27 NOTE — DISCHARGE NOTE PROVIDER - NSDCCPCAREPLAN_GEN_ALL_CORE_FT
PRINCIPAL DISCHARGE DIAGNOSIS  Diagnosis: Acute saddle pulmonary embolism  Assessment and Plan of Treatment: s/p INARI with successful removal of clot, now tolerating RA resting and on ambulation   Continue Eliquis Loading Dose 10 mg BID for 6 more days then continue Eliquis 5 mg BID therafter   Follow up with Cardiology Outpatient      SECONDARY DISCHARGE DIAGNOSES  Diagnosis: Type 2 diabetes mellitus with hyperglycemia  Assessment and Plan of Treatment: continue home regiment    Diagnosis: HTN (hypertension)  Assessment and Plan of Treatment: continue home regiment    Diagnosis: Hyponatremia  Assessment and Plan of Treatment: noted mild drop in Na to 128 during admission, started on Na tablets, will continue on DC, follow up with PCP with repeat BMP in 1 week and urine studies as needed if persistnet hyponatremia    Diagnosis: Acute respiratory failure with hypoxia  Assessment and Plan of Treatment: Resolved after INARI    Diagnosis: DVT, lower extremity  Assessment and Plan of Treatment: Continues on Eliquis,   DVT of RLE noted    Diagnosis: Urothelial cancer  Assessment and Plan of Treatment: s/p Biopsy 8/24, awaiting tissue diagnosis   follow up with urologist outpatient for full diagnosis and referral to oncology as needed     PRINCIPAL DISCHARGE DIAGNOSIS  Diagnosis: Acute saddle pulmonary embolism  Assessment and Plan of Treatment: s/p INARI with successful removal of clot, now tolerating RA resting and on ambulation   Continue Eliquis Loading Dose 10 mg BID for 6 more days then continue Eliquis 5 mg BID therafter   Follow up with Cardiology Outpatient      SECONDARY DISCHARGE DIAGNOSES  Diagnosis: Type 2 diabetes mellitus with hyperglycemia  Assessment and Plan of Treatment: continue Lantus 10 units daily at home, follow up with primary care physician    Diagnosis: HTN (hypertension)  Assessment and Plan of Treatment: continue home regiment    Diagnosis: Hyponatremia  Assessment and Plan of Treatment: noted mild drop in Na to 128 during admission, started on Na tablets, will continue on DC, follow up with PCP with repeat BMP in 1 week and urine studies as needed if persistnet hyponatremia    Diagnosis: Acute respiratory failure with hypoxia  Assessment and Plan of Treatment: Resolved after INARI    Diagnosis: DVT, lower extremity  Assessment and Plan of Treatment: Continues on Eliquis,   DVT of RLE noted    Diagnosis: Urothelial cancer  Assessment and Plan of Treatment: s/p Biopsy 8/24, awaiting tissue diagnosis   follow up with urologist outpatient for full diagnosis and referral to oncology as needed

## 2023-08-27 NOTE — DISCHARGE NOTE PROVIDER - NSDCFUADDINST_GEN_ALL_CORE_FT
Nation will remain in place until Urology follow up, flush and drain as needed daily.     Upon tissue diagnosis of ureteral tumor if not provided with oncologic referral / office number: you may consider   [facility]   [address] [place] [address] [place] [address]  [phone]  Dr. Woodruff / Almaz / Johny  Nation will remain in place until Urology follow up, flush and drain as needed daily.     Upon tissue diagnosis of ureteral tumor if not provided with oncologic referral / office number: you may consider   [place] [id number] [phone]  Dr. Woodruff / Almaz / Johny   [facility] also has IR center for future paracentesis   Dr. Anderson

## 2023-08-27 NOTE — DISCHARGE NOTE NURSING/CASE MANAGEMENT/SOCIAL WORK - NSDCPEFALRISK_GEN_ALL_CORE
For information on Fall & Injury Prevention, visit: [email or web address] OR  [email or web address] OR  https://www.cdc.gov/steadi/patient.html

## 2023-08-27 NOTE — DISCHARGE NOTE PROVIDER - ATTENDING DISCHARGE PHYSICAL EXAMINATION:
T(C): 36.9 (08-27-23 @ 08:06), Max: 37.1 (08-27-23 @ 04:02)  HR: 105 (08-27-23 @ 10:00) (101 - 108)  BP: 124/82 (08-27-23 @ 10:00) (99/72 - 125/88)  RR: 16 (08-27-23 @ 10:00) (16 - 18)  SpO2: 96% (08-27-23 @ 10:00) (95% - 100%)    General: Middle aged male laying in bed, comfortable   HEENT: saturating well on RA   NECK:  Supple  CVS: mildly tachycardic rate and regular rhythm S1 S2  RESP:  CTAB   GI:  Soft protuberant nontender BS+  : Nation with yellow urine in place   MSK:  gait deferred, strength 5/5 in all extremities  CNS:  AOX3. No gross focal or global deficit appreciated  INTEG:  warm dry skin  PSYCH: Fair mood   T(C): 36.9 (08-27-23 @ 08:06), Max: 37.1 (08-27-23 @ 04:02)  HR: 105 (08-27-23 @ 10:00) (101 - 108)  BP: 124/82 (08-27-23 @ 10:00) (99/72 - 125/88)  RR: 16 (08-27-23 @ 10:00) (16 - 18)  SpO2: 96% (08-27-23 @ 10:00) (95% - 100%)    General: Middle aged male laying in bed, comfortable   HEENT: saturating well on RA   NECK:  Supple  CVS: mildly tachycardic intermittently otherwise regular rate and regular rhythm S1 S2  RESP:  CTAB   GI:  Soft protuberant nontender BS+  : Nation with yellow urine in place   MSK:  gait deferred, strength 5/5 in all extremities  CNS:  AOX3. No gross focal or global deficit appreciated  INTEG:  warm dry skin  PSYCH: Fair mood

## 2023-08-27 NOTE — PROVIDER CONTACT NOTE (OTHER) - SITUATION
Pt admitted for l hydronephrosis and acities. Was suppose to DC home today but family refused and wanted one more night of monitoring. Pt asymptomatic during run of vtach with family at bedside.

## 2023-08-27 NOTE — DISCHARGE NOTE PROVIDER - CARE PROVIDER_API CALL
Jerrell García  Interventional Cardiology  [address], [address]  [place] [address]  Phone: [phone]  Fax: [phone]  Follow Up Time: 2 weeks    Cliff Gregory  Urology  [address] [place] [address]  Phone: [phone]  Fax: [phone]  Follow Up Time: 2 weeks    Teresa Chun  Cardiology  [address] [place] [address]  Phone: [phone]  Fax: [phone]  Follow Up Time: 2 weeks

## 2023-08-27 NOTE — DISCHARGE NOTE PROVIDER - PROVIDER TOKENS
PROVIDER:[TOKEN:[[id number]],FOLLOWUP:[2 weeks]],PROVIDER:[TOKEN:[[id number]],FOLLOWUP:[2 weeks]],PROVIDER:[TOKEN:[[id number]],FOLLOWUP:[2 weeks]]

## 2023-08-27 NOTE — DISCHARGE NOTE PROVIDER - NSDCMRMEDTOKEN_GEN_ALL_CORE_FT
amLODIPine 5 mg oral tablet: 1 orally once a day  Eliquis 5 mg oral tablet: 1 tab(s) orally 2 times a day Take 10 mg Twice Daily (2 tablets) until 9/1, then begin taking 5 mg twice daily (1 tablet)  Lanhumera Solostar Pen 100 units/mL subcutaneous solution: 10 unit(s) subcutaneous once a day  olmesartan 40 mg oral tablet: 1 tab(s) orally once a day  Sodium Chloride 1 g oral tablet: 1 tab(s) orally 2 times a day   amLODIPine 5 mg oral tablet: 1 orally once a day  Eliquis 5 mg oral tablet: 1 tab(s) orally 2 times a day Take 10 mg Twice Daily (2 tablets) until 9/1, then begin taking 5 mg twice daily (1 tablet)  Lannikkieus Solostar Pen 100 units/mL subcutaneous solution: 10 unit(s) subcutaneous once a day  Metoprolol Tartrate 25 mg oral tablet: 1 tab(s) orally 2 times a day  olmesartan 40 mg oral tablet: 1 tab(s) orally once a day  Sodium Chloride 1 g oral tablet: 1 tab(s) orally 2 times a day

## 2023-08-27 NOTE — CHART NOTE - NSCHARTNOTEFT_GEN_A_CORE
Notified by RN for 8 beats of NSVT  Pt is asymptomatic, VSS  Electrolytes ordered and stable   RN to notify provider if any further events

## 2023-08-27 NOTE — DISCHARGE NOTE NURSING/CASE MANAGEMENT/SOCIAL WORK - NURSING SECTION COMPLETE
Number Of Passes: 3 Indication: skin texture Detail Level: Zone Treatment Number: 1 Vacuum Pressure: 10 Price (Use Numbers Only, No Special Characters Or $): 110.00 Consent: Written consent obtained, risks reviewed including but not limited to crusting, scabbing, blistering, scarring, darker or lighter pigmentary change, bruising, and/or incomplete response. Prep Text: The patients skin was cleaned and prepped. Wand: Medium Post-Care Instructions: I reviewed with the patient in detail post-care instructions. Patient should stay away from the sun and wear sun protection until treated areas are fully healed. Patient/Caregiver provided printed discharge information.

## 2023-08-27 NOTE — DISCHARGE NOTE PROVIDER - HOSPITAL COURSE
HPI 8/25/23: 67 year old male with PMH HTN, T2DM, Ascites, Left hydronephrosis and recently admitted for Left ureteroscopy and biopsy return within 24 hours of discharge with dyspnea. Denies any chest pain, dizziness, palpitations. Patient taken for CTA Chest with findings of B/L Pulmonary embolism saddle, as well as redemonstrating peritoneal carcinomatosis. TTE was performed as well with McConnells sign noted. Patient seen by urology who cleared patient to start AC if necessary, critical care given Massive PE who signed off. Cardiology took patient for mechanical thrombectomy with successful evacuation of B/L Saddle embolus with no complications. Patient tolerated RA s/p procedure with only minimal tachycardia noted on monitor post procedure. Ambulated on RA with no desaturation / need for Home O2, Started on Eliquis loading for PE the morning post op. Continues on Eliquis 10 mg BID and will continue on DC for completion of 7 day load prior to starting 5 mg BID thereafter, patient will continue AC until otherwise told to stop likely by Hematology as patient has active Cancer. Biopsy pending for Urothelial cancer, patient to follow up with urology outpatient and will need Oncologic follow up when tissue diagnosis is available, given CT Abd showing peritoneal carcinomatosis likely stage IV. Repeat Limited ECHO performed day 2 post op with improvement in RV function, McConnells sign resolved. Patient is currently medically and hemodynamically stable for DC home with Urology, Cardiology follow up and information provided for [facility] for future oncologic treatment when diagnosis is made. HPI 8/25/23: 67 year old male with PMH HTN, T2DM, Ascites, Left hydronephrosis and recently admitted for Left ureteroscopy and biopsy return within 24 hours of discharge with dyspnea. Denies any chest pain, dizziness, palpitations. Patient taken for CTA Chest with findings of B/L Pulmonary embolism saddle, as well as redemonstrating peritoneal carcinomatosis. TTE was performed as well with McConnells sign noted. Patient seen by urology who cleared patient to start AC if necessary, critical care given Massive PE who signed off. Cardiology took patient for mechanical thrombectomy with successful evacuation of B/L Saddle embolus with no complications. Patient tolerated RA s/p procedure with only minimal tachycardia noted on monitor post procedure. Ambulated on RA with no desaturation / need for Home O2, Started on Eliquis loading for PE the morning post op. Continues on Eliquis 10 mg BID and will continue on DC for completion of 7 day load prior to starting 5 mg BID thereafter, patient will continue AC until otherwise told to stop likely by Hematology as patient has active Cancer. Biopsy pending for Urothelial cancer, patient to follow up with urology outpatient and will need Oncologic follow up when tissue diagnosis is available, given CT Abd showing peritoneal carcinomatosis likely stage IV. Repeat Limited ECHO performed day 2 post op with improvement in RV function, McConnells sign resolved. Patient noted to have mild abdominal discomfort with increased distention prompting Abd US limited showing moderate to large Ascites, discussed case with IR, however no plans for paracentesis while patient is on Eliquis Load, may plan for paracentesis outpatient. Given Ascites is likely malignant in origin in setting of peritoneal carcinomatosis may be followed with [facility] IR for tap and studies. Patient is currently medically and hemodynamically stable for DC home with Urology, Cardiology follow up and information provided for [facility] for future oncologic treatment as well as IR for paracentesis when diagnosis is made.

## 2023-08-28 VITALS
RESPIRATION RATE: 17 BRPM | SYSTOLIC BLOOD PRESSURE: 126 MMHG | DIASTOLIC BLOOD PRESSURE: 88 MMHG | OXYGEN SATURATION: 99 % | HEART RATE: 88 BPM | TEMPERATURE: 99 F

## 2023-08-28 PROBLEM — R18.8 OTHER ASCITES: Chronic | Status: ACTIVE | Noted: 2023-08-25

## 2023-08-28 PROBLEM — N13.30 UNSPECIFIED HYDRONEPHROSIS: Chronic | Status: ACTIVE | Noted: 2023-08-25

## 2023-08-28 LAB
ALBUMIN SERPL ELPH-MCNC: 2.6 G/DL — LOW (ref 3.3–5.2)
ALP SERPL-CCNC: 112 U/L — SIGNIFICANT CHANGE UP (ref 40–120)
ALT FLD-CCNC: 19 U/L — SIGNIFICANT CHANGE UP
ANION GAP SERPL CALC-SCNC: 13 MMOL/L — SIGNIFICANT CHANGE UP (ref 5–17)
APTT BLD: 30.7 SEC — SIGNIFICANT CHANGE UP (ref 24.5–35.6)
AST SERPL-CCNC: 28 U/L — SIGNIFICANT CHANGE UP
BILIRUB SERPL-MCNC: 0.3 MG/DL — LOW (ref 0.4–2)
BUN SERPL-MCNC: 8.7 MG/DL — SIGNIFICANT CHANGE UP (ref 8–20)
CALCIUM SERPL-MCNC: 8.2 MG/DL — LOW (ref 8.4–10.5)
CHLORIDE SERPL-SCNC: 99 MMOL/L — SIGNIFICANT CHANGE UP (ref 96–108)
CO2 SERPL-SCNC: 21 MMOL/L — LOW (ref 22–29)
CREAT SERPL-MCNC: 0.67 MG/DL — SIGNIFICANT CHANGE UP (ref 0.5–1.3)
EGFR: 104 ML/MIN/1.73M2 — SIGNIFICANT CHANGE UP
GLUCOSE BLDC GLUCOMTR-MCNC: 161 MG/DL — HIGH (ref 70–99)
GLUCOSE BLDC GLUCOMTR-MCNC: 228 MG/DL — HIGH (ref 70–99)
GLUCOSE SERPL-MCNC: 140 MG/DL — HIGH (ref 70–99)
HCT VFR BLD CALC: 35.2 % — LOW (ref 39–50)
HGB BLD-MCNC: 11.9 G/DL — LOW (ref 13–17)
INR BLD: 1.23 RATIO — HIGH (ref 0.85–1.18)
MCHC RBC-ENTMCNC: 29 PG — SIGNIFICANT CHANGE UP (ref 27–34)
MCHC RBC-ENTMCNC: 33.8 GM/DL — SIGNIFICANT CHANGE UP (ref 32–36)
MCV RBC AUTO: 85.6 FL — SIGNIFICANT CHANGE UP (ref 80–100)
PLATELET # BLD AUTO: 306 K/UL — SIGNIFICANT CHANGE UP (ref 150–400)
POTASSIUM SERPL-MCNC: 4 MMOL/L — SIGNIFICANT CHANGE UP (ref 3.5–5.3)
POTASSIUM SERPL-SCNC: 4 MMOL/L — SIGNIFICANT CHANGE UP (ref 3.5–5.3)
PROT SERPL-MCNC: 5.8 G/DL — LOW (ref 6.6–8.7)
PROTHROM AB SERPL-ACNC: 13.6 SEC — HIGH (ref 9.5–13)
RBC # BLD: 4.11 M/UL — LOW (ref 4.2–5.8)
RBC # FLD: 13.1 % — SIGNIFICANT CHANGE UP (ref 10.3–14.5)
SODIUM SERPL-SCNC: 133 MMOL/L — LOW (ref 135–145)
WBC # BLD: 6.83 K/UL — SIGNIFICANT CHANGE UP (ref 3.8–10.5)
WBC # FLD AUTO: 6.83 K/UL — SIGNIFICANT CHANGE UP (ref 3.8–10.5)

## 2023-08-28 PROCEDURE — 99239 HOSP IP/OBS DSCHRG MGMT >30: CPT

## 2023-08-28 RX ORDER — METOPROLOL TARTRATE 50 MG
25 TABLET ORAL
Refills: 0 | Status: DISCONTINUED | OUTPATIENT
Start: 2023-08-28 | End: 2023-08-28

## 2023-08-28 RX ORDER — METOPROLOL TARTRATE 50 MG
1 TABLET ORAL
Qty: 60 | Refills: 0
Start: 2023-08-28 | End: 2023-09-26

## 2023-08-28 RX ADMIN — SODIUM CHLORIDE 1 GRAM(S): 9 INJECTION INTRAMUSCULAR; INTRAVENOUS; SUBCUTANEOUS at 05:31

## 2023-08-28 RX ADMIN — SODIUM CHLORIDE 1 GRAM(S): 9 INJECTION INTRAMUSCULAR; INTRAVENOUS; SUBCUTANEOUS at 13:50

## 2023-08-28 RX ADMIN — Medication 25 MILLIGRAM(S): at 11:50

## 2023-08-28 RX ADMIN — Medication 2: at 11:50

## 2023-08-28 RX ADMIN — Medication 1: at 09:28

## 2023-08-28 RX ADMIN — APIXABAN 10 MILLIGRAM(S): 2.5 TABLET, FILM COATED ORAL at 05:31

## 2023-08-28 NOTE — CHART NOTE - NSCHARTNOTEFT_GEN_A_CORE
Consulted by Provider for paracentesis  Patient on loading Eliquis for PE   due to the risk of bleeding unable to perform paracentesis until Eliquis can be held  patient can follow up as outpatient for paracentesis

## 2023-08-29 LAB — SURGICAL PATHOLOGY STUDY: SIGNIFICANT CHANGE UP

## 2023-08-30 ENCOUNTER — TRANSCRIPTION ENCOUNTER (OUTPATIENT)
Age: 68
End: 2023-08-30

## 2023-08-31 ENCOUNTER — TRANSCRIPTION ENCOUNTER (OUTPATIENT)
Age: 68
End: 2023-08-31

## 2023-08-31 ENCOUNTER — APPOINTMENT (OUTPATIENT)
Dept: UROLOGY | Facility: CLINIC | Age: 68
End: 2023-08-31
Payer: MEDICARE

## 2023-08-31 VITALS
BODY MASS INDEX: 25.23 KG/M2 | OXYGEN SATURATION: 97 % | HEART RATE: 99 BPM | SYSTOLIC BLOOD PRESSURE: 119 MMHG | DIASTOLIC BLOOD PRESSURE: 85 MMHG | HEIGHT: 66 IN | WEIGHT: 157 LBS

## 2023-08-31 DIAGNOSIS — I26.99 OTHER PULMONARY EMBOLISM W/OUT ACUTE COR PULMONALE: ICD-10-CM

## 2023-08-31 DIAGNOSIS — R33.9 RETENTION OF URINE, UNSPECIFIED: ICD-10-CM

## 2023-08-31 PROCEDURE — A4216: CPT | Mod: NC

## 2023-08-31 PROCEDURE — 51700 IRRIGATION OF BLADDER: CPT

## 2023-08-31 PROCEDURE — 99215 OFFICE O/P EST HI 40 MIN: CPT | Mod: 25

## 2023-08-31 RX ORDER — TAMSULOSIN HYDROCHLORIDE 0.4 MG/1
0.4 CAPSULE ORAL
Qty: 30 | Refills: 1 | Status: ACTIVE | COMMUNITY
Start: 2023-08-31 | End: 1900-01-01

## 2023-08-31 RX ORDER — APIXABAN 5 MG/1
5 TABLET, FILM COATED ORAL
Qty: 30 | Refills: 0 | Status: ACTIVE | COMMUNITY
Start: 2023-08-31

## 2023-08-31 NOTE — ASSESSMENT
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 ha spasms and he seemed to pass it.  Nation left out.  long discussion with patient and his family regarding disease, prognosis, etc.  Follow up next week for stent removal. He needs to see oncology Stent out next week.

## 2023-08-31 NOTE — HISTORY OF PRESENT ILLNESS
[FreeTextEntry1] : Patient presents in follow up. He had a ureteral mass excision and biopsy.  Path pending.  He went into retention.  he has a stent in the left side and left neph tube.  The patient had a faria placed. He had episode of severe SOB and came to the hospital the night of surgery. Diagnosed with PE.  He had extraction of this. No problems now. Is noticing enlargement of hsi abdomane again.   Biopsies have returned from omentum which showed carcinoma not otherwise characterized.

## 2023-08-31 NOTE — LETTER BODY
[Dear  ___] : Dear  [unfilled], [Courtesy Letter:] : I had the pleasure of seeing your patient, [unfilled], in my office today. [Please see my note below.] : Please see my note below. [Sincerely,] : Sincerely, [FreeTextEntry3] : Ed  Cliff Gregory MD [facility]  of [facility] [employer] [facility]

## 2023-09-04 ENCOUNTER — OUTPATIENT (OUTPATIENT)
Dept: OUTPATIENT SERVICES | Facility: HOSPITAL | Age: 68
LOS: 1 days | Discharge: HOME | End: 2023-09-04

## 2023-09-04 DIAGNOSIS — Z90.49 ACQUIRED ABSENCE OF OTHER SPECIFIED PARTS OF DIGESTIVE TRACT: Chronic | ICD-10-CM

## 2023-09-04 DIAGNOSIS — C48.1 MALIGNANT NEOPLASM OF SPECIFIED PARTS OF PERITONEUM: ICD-10-CM

## 2023-09-04 DIAGNOSIS — Z98.890 OTHER SPECIFIED POSTPROCEDURAL STATES: Chronic | ICD-10-CM

## 2023-09-05 ENCOUNTER — APPOINTMENT (OUTPATIENT)
Dept: UROLOGY | Facility: CLINIC | Age: 68
End: 2023-09-05
Payer: MEDICARE

## 2023-09-05 PROCEDURE — 52310 CYSTOSCOPY AND TREATMENT: CPT | Mod: LT

## 2023-09-05 RX ORDER — GENTAMICIN SULFATE 40 MG/ML
40 INJECTION, SOLUTION INTRAMUSCULAR; INTRAVENOUS
Qty: 1 | Refills: 0 | Status: COMPLETED | OUTPATIENT
Start: 2023-09-05

## 2023-09-05 RX ADMIN — GENTAMICIN SULFATE 0 MG/ML: 40 INJECTION, SOLUTION INTRAMUSCULAR; INTRAVENOUS at 00:00

## 2023-09-06 LAB
CULTURE RESULTS: SIGNIFICANT CHANGE UP
SPECIMEN SOURCE: SIGNIFICANT CHANGE UP

## 2023-09-07 ENCOUNTER — RESULT REVIEW (OUTPATIENT)
Age: 68
End: 2023-09-07

## 2023-09-07 ENCOUNTER — APPOINTMENT (OUTPATIENT)
Dept: HEMATOLOGY ONCOLOGY | Facility: CLINIC | Age: 68
End: 2023-09-07
Payer: MEDICARE

## 2023-09-07 ENCOUNTER — NON-APPOINTMENT (OUTPATIENT)
Age: 68
End: 2023-09-07

## 2023-09-07 ENCOUNTER — APPOINTMENT (OUTPATIENT)
Dept: UROLOGY | Facility: CLINIC | Age: 68
End: 2023-09-07

## 2023-09-07 VITALS
HEART RATE: 99 BPM | DIASTOLIC BLOOD PRESSURE: 92 MMHG | HEIGHT: 66 IN | WEIGHT: 177 LBS | OXYGEN SATURATION: 96 % | TEMPERATURE: 98.3 F | BODY MASS INDEX: 28.45 KG/M2 | SYSTOLIC BLOOD PRESSURE: 127 MMHG

## 2023-09-07 PROCEDURE — 99205 OFFICE O/P NEW HI 60 MIN: CPT

## 2023-09-07 NOTE — HISTORY OF PRESENT ILLNESS
[de-identified] : Mr Zimmer is a very pleasant 67 year old male with a history of HTN, T2DM, and ascites, who was found to have peritoneal carcinomatosis, and Left hydronephrosis s/p Left ureteroscopy and biopsy and a subsequent bilateral pulmonary embolus with pathology consistent with metastatic urothelial carcinoma. He was found to have ascites in [facility] and then presented to [facility] with persistent left flank pain found to have hydronephrosis due to a ureteral lesion as well as omental densities suspicious for underlying malignancy. Pt admitted to [facility] for hydronephrosis. Urology and GI were consulted. Pt underwent nephrostomy tube placement on 8/2 and tolerated procedure well without any complications.  GI was consulted for omental densities founded on imaging.  given possibility for malignancy, IR was consulted, and recommended IR biopsy and/or paracentesis as Outpatient.  Ascites was positive for malignant cells.  CT guided omental biopsy was performed on 08/07/23.  Pathology showed a poorly differentiated carcinoma. IHC was negative for calretinin, GATA3, chromogranin, synaptophysin, SOX10, S100, and D2-40 (podoplanin).  Immunostains for CD3 and CD20 highlights background T and B cells. The overall histology and immunophenotype is consistent with a poorly differentiated carcinoma, primary site undetermined(possible source is  tract and clinical correlation recommended). On 08/24/23 he underwent cystoscopy with left retrograde pyelogram, left ureteroscopy with biopsy of mid left ureteral mass.  That biopsy result is pending. He was readmitted to the hospital after that for shortness of breath and was found to have a bilateral pulmonary embolus and he was started on eliquis. Today he comes for medical oncology consultation. He feels SOB when he lies down flat.  His O2 sat is 98% on RA. He denies any fever, chills, chest pain, SOB, nausea, vomiting, diarrhea, constipation, dysuria, hematuria, hematochezia, or melena.

## 2023-09-07 NOTE — ASSESSMENT
[FreeTextEntry1] : Mr Zimmer is a very pleasant 67 year old man with a history of hypertension, DM, ascites, and bilateral PE who was recently found to have carcinomatosis and a left ureteral mass consistent with stage IV urothelial carcinoma   -Urothelial carcinoma: Stage IV disease, still awaiting final pathology from ureteral mass, however totality of omental pathology and radigraphic findings are consistent with metastatic urothelial carcinoma. He is symptomatic again from ascites, will request IR perform a therapeutic paracentesis when they place chemo-port.  I have recommended palliative gemcitabine/cisplatin followed by avelumab maintenance if no disease progression on imaging. Will plan for 4-6 cycles of gem/cis and then obtain imaging.  We will also request [facility] full genetic interrogation of the tumor to asses for actionable mutations to further guide therapy now or subsequently. He will return to initiate treatment

## 2023-09-07 NOTE — PHYSICAL EXAM
[Restricted in physically strenuous activity but ambulatory and able to carry out work of a light or sedentary nature] : Status 1- Restricted in physically strenuous activity but ambulatory and able to carry out work of a light or sedentary nature, e.g., light house work, office work [Normal] : affect appropriate [de-identified] : +ascites [de-identified] : nephrostomy tube

## 2023-09-08 ENCOUNTER — INPATIENT (INPATIENT)
Facility: HOSPITAL | Age: 68
LOS: 1 days | Discharge: HOME HEALTH | DRG: 375 | End: 2023-09-10
Attending: HOSPITALIST | Admitting: INTERNAL MEDICINE
Payer: MEDICARE

## 2023-09-08 VITALS
WEIGHT: 173.06 LBS | RESPIRATION RATE: 20 BRPM | SYSTOLIC BLOOD PRESSURE: 126 MMHG | HEART RATE: 96 BPM | DIASTOLIC BLOOD PRESSURE: 82 MMHG | TEMPERATURE: 98 F | OXYGEN SATURATION: 98 % | HEIGHT: 66 IN

## 2023-09-08 DIAGNOSIS — Z98.890 OTHER SPECIFIED POSTPROCEDURAL STATES: Chronic | ICD-10-CM

## 2023-09-08 DIAGNOSIS — Z90.49 ACQUIRED ABSENCE OF OTHER SPECIFIED PARTS OF DIGESTIVE TRACT: Chronic | ICD-10-CM

## 2023-09-08 DIAGNOSIS — R18.8 OTHER ASCITES: ICD-10-CM

## 2023-09-08 LAB
ALBUMIN SERPL ELPH-MCNC: 3 G/DL — LOW (ref 3.3–5.2)
ALP SERPL-CCNC: 110 U/L — SIGNIFICANT CHANGE UP (ref 40–120)
ALT FLD-CCNC: 14 U/L — SIGNIFICANT CHANGE UP
ANION GAP SERPL CALC-SCNC: 14 MMOL/L — SIGNIFICANT CHANGE UP (ref 5–17)
APTT BLD: 29.2 SEC
APTT BLD: 32.2 SEC — SIGNIFICANT CHANGE UP (ref 24.5–35.6)
AST SERPL-CCNC: 21 U/L — SIGNIFICANT CHANGE UP
BASOPHILS # BLD AUTO: 0.03 K/UL — SIGNIFICANT CHANGE UP (ref 0–0.2)
BASOPHILS NFR BLD AUTO: 0.4 % — SIGNIFICANT CHANGE UP (ref 0–2)
BILIRUB SERPL-MCNC: 0.3 MG/DL — LOW (ref 0.4–2)
BUN SERPL-MCNC: 11.3 MG/DL — SIGNIFICANT CHANGE UP (ref 8–20)
CALCIUM SERPL-MCNC: 8.6 MG/DL — SIGNIFICANT CHANGE UP (ref 8.4–10.5)
CHLORIDE SERPL-SCNC: 94 MMOL/L — LOW (ref 96–108)
CO2 SERPL-SCNC: 20 MMOL/L — LOW (ref 22–29)
CREAT SERPL-MCNC: 0.82 MG/DL — SIGNIFICANT CHANGE UP (ref 0.5–1.3)
EGFR: 97 ML/MIN/1.73M2 — SIGNIFICANT CHANGE UP
EOSINOPHIL # BLD AUTO: 0.07 K/UL — SIGNIFICANT CHANGE UP (ref 0–0.5)
EOSINOPHIL NFR BLD AUTO: 0.9 % — SIGNIFICANT CHANGE UP (ref 0–6)
GLUCOSE BLDC GLUCOMTR-MCNC: 145 MG/DL — HIGH (ref 70–99)
GLUCOSE SERPL-MCNC: 190 MG/DL — HIGH (ref 70–99)
HCT VFR BLD CALC: 34.5 % — LOW (ref 39–50)
HGB BLD-MCNC: 11.8 G/DL — LOW (ref 13–17)
IMM GRANULOCYTES NFR BLD AUTO: 0.4 % — SIGNIFICANT CHANGE UP (ref 0–0.9)
INR BLD: 1.31 RATIO — HIGH (ref 0.85–1.18)
INR PPP: 1.03 RATIO
LYMPHOCYTES # BLD AUTO: 0.81 K/UL — LOW (ref 1–3.3)
LYMPHOCYTES # BLD AUTO: 10.5 % — LOW (ref 13–44)
MCHC RBC-ENTMCNC: 29.3 PG — SIGNIFICANT CHANGE UP (ref 27–34)
MCHC RBC-ENTMCNC: 34.2 GM/DL — SIGNIFICANT CHANGE UP (ref 32–36)
MCV RBC AUTO: 85.6 FL — SIGNIFICANT CHANGE UP (ref 80–100)
MONOCYTES # BLD AUTO: 0.57 K/UL — SIGNIFICANT CHANGE UP (ref 0–0.9)
MONOCYTES NFR BLD AUTO: 7.4 % — SIGNIFICANT CHANGE UP (ref 2–14)
NEUTROPHILS # BLD AUTO: 6.18 K/UL — SIGNIFICANT CHANGE UP (ref 1.8–7.4)
NEUTROPHILS NFR BLD AUTO: 80.4 % — HIGH (ref 43–77)
NT-PROBNP SERPL-SCNC: 974 PG/ML — HIGH (ref 0–300)
PLATELET # BLD AUTO: 387 K/UL — SIGNIFICANT CHANGE UP (ref 150–400)
POTASSIUM SERPL-MCNC: 4.2 MMOL/L — SIGNIFICANT CHANGE UP (ref 3.5–5.3)
POTASSIUM SERPL-SCNC: 4.2 MMOL/L — SIGNIFICANT CHANGE UP (ref 3.5–5.3)
PROT SERPL-MCNC: 6.2 G/DL — LOW (ref 6.6–8.7)
PROTHROM AB SERPL-ACNC: 14.4 SEC — HIGH (ref 9.5–13)
PT BLD: 11.6 SEC
RBC # BLD: 4.03 M/UL — LOW (ref 4.2–5.8)
RBC # FLD: 13.5 % — SIGNIFICANT CHANGE UP (ref 10.3–14.5)
SODIUM SERPL-SCNC: 128 MMOL/L — LOW (ref 135–145)
TROPONIN T SERPL-MCNC: <0.01 NG/ML — SIGNIFICANT CHANGE UP (ref 0–0.06)
WBC # BLD: 7.69 K/UL — SIGNIFICANT CHANGE UP (ref 3.8–10.5)
WBC # FLD AUTO: 7.69 K/UL — SIGNIFICANT CHANGE UP (ref 3.8–10.5)

## 2023-09-08 PROCEDURE — 93010 ELECTROCARDIOGRAM REPORT: CPT

## 2023-09-08 PROCEDURE — 99223 1ST HOSP IP/OBS HIGH 75: CPT

## 2023-09-08 PROCEDURE — 99285 EMERGENCY DEPT VISIT HI MDM: CPT

## 2023-09-08 PROCEDURE — 71045 X-RAY EXAM CHEST 1 VIEW: CPT | Mod: 26

## 2023-09-08 PROCEDURE — 49083 ABD PARACENTESIS W/IMAGING: CPT

## 2023-09-08 RX ORDER — DEXTROSE 50 % IN WATER 50 %
25 SYRINGE (ML) INTRAVENOUS ONCE
Refills: 0 | Status: DISCONTINUED | OUTPATIENT
Start: 2023-09-08 | End: 2023-09-10

## 2023-09-08 RX ORDER — INSULIN LISPRO 100/ML
VIAL (ML) SUBCUTANEOUS
Refills: 0 | Status: DISCONTINUED | OUTPATIENT
Start: 2023-09-08 | End: 2023-09-10

## 2023-09-08 RX ORDER — ALBUMIN HUMAN 25 %
100 VIAL (ML) INTRAVENOUS
Refills: 0 | Status: COMPLETED | OUTPATIENT
Start: 2023-09-08 | End: 2023-09-08

## 2023-09-08 RX ORDER — ACETAMINOPHEN 500 MG
650 TABLET ORAL EVERY 6 HOURS
Refills: 0 | Status: DISCONTINUED | OUTPATIENT
Start: 2023-09-08 | End: 2023-09-10

## 2023-09-08 RX ORDER — LOSARTAN POTASSIUM 100 MG/1
100 TABLET, FILM COATED ORAL DAILY
Refills: 0 | Status: DISCONTINUED | OUTPATIENT
Start: 2023-09-08 | End: 2023-09-09

## 2023-09-08 RX ORDER — AMLODIPINE BESYLATE 2.5 MG/1
5 TABLET ORAL DAILY
Refills: 0 | Status: DISCONTINUED | OUTPATIENT
Start: 2023-09-08 | End: 2023-09-09

## 2023-09-08 RX ORDER — GLUCAGON INJECTION, SOLUTION 0.5 MG/.1ML
1 INJECTION, SOLUTION SUBCUTANEOUS ONCE
Refills: 0 | Status: DISCONTINUED | OUTPATIENT
Start: 2023-09-08 | End: 2023-09-10

## 2023-09-08 RX ORDER — INSULIN GLARGINE 100 [IU]/ML
15 INJECTION, SOLUTION SUBCUTANEOUS AT BEDTIME
Refills: 0 | Status: DISCONTINUED | OUTPATIENT
Start: 2023-09-08 | End: 2023-09-10

## 2023-09-08 RX ORDER — METOPROLOL TARTRATE 50 MG
25 TABLET ORAL
Refills: 0 | Status: DISCONTINUED | OUTPATIENT
Start: 2023-09-08 | End: 2023-09-10

## 2023-09-08 RX ORDER — SODIUM CHLORIDE 9 MG/ML
1000 INJECTION, SOLUTION INTRAVENOUS
Refills: 0 | Status: DISCONTINUED | OUTPATIENT
Start: 2023-09-08 | End: 2023-09-10

## 2023-09-08 RX ORDER — DEXTROSE 50 % IN WATER 50 %
12.5 SYRINGE (ML) INTRAVENOUS ONCE
Refills: 0 | Status: DISCONTINUED | OUTPATIENT
Start: 2023-09-08 | End: 2023-09-10

## 2023-09-08 RX ORDER — DEXTROSE 50 % IN WATER 50 %
15 SYRINGE (ML) INTRAVENOUS ONCE
Refills: 0 | Status: DISCONTINUED | OUTPATIENT
Start: 2023-09-08 | End: 2023-09-10

## 2023-09-08 RX ADMIN — INSULIN GLARGINE 15 UNIT(S): 100 INJECTION, SOLUTION SUBCUTANEOUS at 21:56

## 2023-09-08 RX ADMIN — Medication 0.5 MILLIGRAM(S): at 12:17

## 2023-09-08 RX ADMIN — Medication 50 MILLILITER(S): at 17:22

## 2023-09-08 RX ADMIN — Medication 50 MILLILITER(S): at 21:54

## 2023-09-08 RX ADMIN — Medication 25 MILLIGRAM(S): at 17:23

## 2023-09-08 NOTE — PATIENT PROFILE ADULT - FALL HARM RISK - FALLEN IN PAST
No
assumed care of pt at 2215 in CC. MD Vanegas at bedside. pt reports difficulty swallowing and lip swelling after ingesting shrimp at hibachi dinner. able to visualize throat, speaking in full sentences.  lip swelling present. no sob present. noted to be sating 100% on RA. placed on cardiac monitor and . rr even and unlabored. anox4 pt educated on plan of care, pt able to successfully teach back plan of care to RN, RN will continue to reeducate pt during hospital stay.

## 2023-09-08 NOTE — ED PROVIDER NOTE - PROGRESS NOTE DETAILS
Clint: IR aware of pt and will plan for monday given took eliquis today. satting well but with continued symptoms, significantly distended. admitted for eventual paracentesis.

## 2023-09-08 NOTE — ED ADULT NURSE NOTE - OBJECTIVE STATEMENT
Assumed care of pt at 1052 Pt A&Ox4 c/o SOB, dizziness and distended abdomen. Pt presents to ED with distended abdomen hard to touch. VS as charted, afebrile in ED. Pt on Eliquis, and scheduled for paracentesis this upcoming Wednesday. Son at bedside with pt. RR Even and unlabored.

## 2023-09-08 NOTE — ED PROVIDER NOTE - PHYSICAL EXAMINATION
Gen: No acute distress, non toxic  HEENT: Mucous membranes moist, pink conjunctivae, EOMI  CV: RRR, nl s1/s2.  Resp: CTAB, normal rate and effort  GI: abd distended, no ttp,  : No CVAT left nephrostomy tube with small urine.   Neuro: A&O x 3, moving all 4 extremities  MSK: No spine or joint tenderness to palpation  Skin: No rashes. intact and perfused.

## 2023-09-08 NOTE — ED ADULT TRIAGE NOTE - CHIEF COMPLAINT QUOTE
Pt A&Ox4, NAD. Pt presents to the ED with complaints of SOB. Abdomen distended, needs paracentesis. Newly dx with CA. Pt with left nephrostomy tube. Breathing even and unlabored. EKG completed.

## 2023-09-08 NOTE — ED ADULT NURSE NOTE - ALCOHOL PRE SCREEN (AUDIT - C)
Statement Selected Prednisone Pregnancy And Lactation Text: This medication is Pregnancy Category C and it isn't know if it is safe during pregnancy. This medication is excreted in breast milk.

## 2023-09-08 NOTE — H&P ADULT - HISTORY OF PRESENT ILLNESS
68 yo M w/ hx DM2, left hydronephrosis post nephro tube, peritoneal carcinomatosis planned for chemo initiation, recent dc for DVT/PE post mechanical thrombectomy, started on eliquis, planned for paracentesis electively next week presents for progressive abdominal distension.  Noted also HER and orthopnea. denies fevers/chills or chest pain. took eliquis 5mg this am.

## 2023-09-08 NOTE — ED ADULT NURSE NOTE - NSFALLUNIVINTERV_ED_ALL_ED
Bed/Stretcher in lowest position, wheels locked, appropriate side rails in place/Call bell, personal items and telephone in reach/Instruct patient to call for assistance before getting out of bed/chair/stretcher/Non-slip footwear applied when patient is off stretcher/[place] to call system/Physically safe environment - no spills, clutter or unnecessary equipment/Purposeful proactive rounding/Room/bathroom lighting operational, light cord in reach

## 2023-09-08 NOTE — H&P ADULT - ASSESSMENT
66 yo M w/ hx DM2, left hydronephrosis post nephro tube, peritoneal carcinomatosis planned for chemo initiation, recent dc for DVT/PE post mechanical thrombectomy, started on eliquis, planned for paracentesis electively next week presents for progressive abdominal distension.  Noted also HER and orthopnea. denies fevers/chills or chest pain. took eliquis 5mg this am.       malignant ascites  peritoneal carcinomatosis      IR consulted for therapeutic paracentesis --today possible     restart heparin drip when cleared by IR     eliquis on dc     outpatient f/u for chemo initiation    hx dvt/PE: see above    dm2: sliding scale/ lantus    HTN: norvasc/ lopressor/losartan    hyponatremia; likely 2/2 hypovolemia    hold nacl tabs    d/w patient and son at bedside.  66 yo M w/ hx DM2, left hydronephrosis post nephro tube, peritoneal carcinomatosis planned for chemo initiation, recent dc for DVT/PE post mechanical thrombectomy, started on eliquis, planned for paracentesis electively next week presents for progressive abdominal distension.  Noted also HER and orthopnea. denies fevers/chills or chest pain. took eliquis 5mg this am.       malignant ascites  peritoneal carcinomatosis      IR consulted for therapeutic paracentesis --today possible     restart heparin drip when cleared by IR     eliquis on dc     outpatient f/u for chemo initiation    hx dvt/PE: see above    dm2: sliding scale/ lantus    HTN: norvasc/ lopressor/losartan    hyponatremia; likely 2/2 hypervolemia    hold nacl tabs    d/w patient and son at bedside.

## 2023-09-08 NOTE — ED PROVIDER NOTE - CLINICAL SUMMARY MEDICAL DECISION MAKING FREE TEXT BOX
66 y/o male with ascites with peritoneal carcinomatosis, recent saddle PE with thrombectomy on eliquis present with worsening abd distension causing pt to feel more discomfort when breathing/laying flat. no abd pain or fever. no other complaints. has scheduled para for 5 dys from now but progressing. eliquis taken 1 hour PTA. discussed with Andrea from IR, given eliquis, could not be tapped today. pt currently satting well. will discuss options with pt including heparin drip wihle off eliquis vs returning another day.

## 2023-09-09 ENCOUNTER — TRANSCRIPTION ENCOUNTER (OUTPATIENT)
Age: 68
End: 2023-09-09

## 2023-09-09 LAB
ANION GAP SERPL CALC-SCNC: 9 MMOL/L — SIGNIFICANT CHANGE UP (ref 5–17)
BUN SERPL-MCNC: 11.9 MG/DL — SIGNIFICANT CHANGE UP (ref 8–20)
CALCIUM SERPL-MCNC: 8.3 MG/DL — LOW (ref 8.4–10.5)
CHLORIDE SERPL-SCNC: 97 MMOL/L — SIGNIFICANT CHANGE UP (ref 96–108)
CO2 SERPL-SCNC: 26 MMOL/L — SIGNIFICANT CHANGE UP (ref 22–29)
CREAT SERPL-MCNC: 0.87 MG/DL — SIGNIFICANT CHANGE UP (ref 0.5–1.3)
EGFR: 96 ML/MIN/1.73M2 — SIGNIFICANT CHANGE UP
GLUCOSE BLDC GLUCOMTR-MCNC: 109 MG/DL — HIGH (ref 70–99)
GLUCOSE BLDC GLUCOMTR-MCNC: 138 MG/DL — HIGH (ref 70–99)
GLUCOSE BLDC GLUCOMTR-MCNC: 157 MG/DL — HIGH (ref 70–99)
GLUCOSE BLDC GLUCOMTR-MCNC: 189 MG/DL — HIGH (ref 70–99)
GLUCOSE SERPL-MCNC: 168 MG/DL — HIGH (ref 70–99)
POTASSIUM SERPL-MCNC: 4.3 MMOL/L — SIGNIFICANT CHANGE UP (ref 3.5–5.3)
POTASSIUM SERPL-SCNC: 4.3 MMOL/L — SIGNIFICANT CHANGE UP (ref 3.5–5.3)
SODIUM SERPL-SCNC: 132 MMOL/L — LOW (ref 135–145)

## 2023-09-09 PROCEDURE — 99233 SBSQ HOSP IP/OBS HIGH 50: CPT

## 2023-09-09 RX ORDER — SODIUM CHLORIDE 9 MG/ML
1 INJECTION INTRAMUSCULAR; INTRAVENOUS; SUBCUTANEOUS
Refills: 0 | Status: DISCONTINUED | OUTPATIENT
Start: 2023-09-09 | End: 2023-09-10

## 2023-09-09 RX ORDER — APIXABAN 2.5 MG/1
2.5 TABLET, FILM COATED ORAL EVERY 12 HOURS
Refills: 0 | Status: DISCONTINUED | OUTPATIENT
Start: 2023-09-09 | End: 2023-09-10

## 2023-09-09 RX ORDER — ALBUMIN HUMAN 25 %
100 VIAL (ML) INTRAVENOUS ONCE
Refills: 0 | Status: DISCONTINUED | OUTPATIENT
Start: 2023-09-09 | End: 2023-09-09

## 2023-09-09 RX ORDER — FUROSEMIDE 40 MG
20 TABLET ORAL DAILY
Refills: 0 | Status: DISCONTINUED | OUTPATIENT
Start: 2023-09-09 | End: 2023-09-09

## 2023-09-09 RX ADMIN — AMLODIPINE BESYLATE 5 MILLIGRAM(S): 2.5 TABLET ORAL at 05:26

## 2023-09-09 RX ADMIN — Medication 2: at 18:27

## 2023-09-09 RX ADMIN — LOSARTAN POTASSIUM 100 MILLIGRAM(S): 100 TABLET, FILM COATED ORAL at 05:26

## 2023-09-09 RX ADMIN — INSULIN GLARGINE 15 UNIT(S): 100 INJECTION, SOLUTION SUBCUTANEOUS at 21:38

## 2023-09-09 RX ADMIN — APIXABAN 2.5 MILLIGRAM(S): 2.5 TABLET, FILM COATED ORAL at 18:27

## 2023-09-09 RX ADMIN — Medication 2: at 09:27

## 2023-09-09 RX ADMIN — SODIUM CHLORIDE 1 GRAM(S): 9 INJECTION INTRAMUSCULAR; INTRAVENOUS; SUBCUTANEOUS at 18:27

## 2023-09-09 RX ADMIN — Medication 25 MILLIGRAM(S): at 18:27

## 2023-09-09 RX ADMIN — Medication 25 MILLIGRAM(S): at 05:26

## 2023-09-09 NOTE — PROGRESS NOTE ADULT - SUBJECTIVE AND OBJECTIVE BOX
Patient is a 67y old  Male who presents with a chief complaint of abdominal distension (09 Sep 2023 14:19)      Patient seen and examined at bedside, chart reviewed   Son Cliff on the phone pt and son prefers to translate   pt is feeling better   no SOB   labs reviewed   BP is soft       ALLERGIES:  No Known Allergies    MEDICATIONS  (STANDING):  dextrose 5%. 1000 milliLiter(s) (50 mL/Hr) IV Continuous <Continuous>  dextrose 5%. 1000 milliLiter(s) (100 mL/Hr) IV Continuous <Continuous>  dextrose 50% Injectable 25 Gram(s) IV Push once  dextrose 50% Injectable 12.5 Gram(s) IV Push once  dextrose 50% Injectable 25 Gram(s) IV Push once  furosemide    Tablet 20 milliGRAM(s) Oral daily  glucagon  Injectable 1 milliGRAM(s) IntraMuscular once  insulin glargine Injectable (LANTUS) 15 Unit(s) SubCutaneous at bedtime  insulin lispro (ADMELOG) corrective regimen sliding scale   SubCutaneous three times a day before meals  metoprolol tartrate 25 milliGRAM(s) Oral two times a day  sodium chloride 1 Gram(s) Oral two times a day    MEDICATIONS  (PRN):  acetaminophen     Tablet .. 650 milliGRAM(s) Oral every 6 hours PRN Temp greater or equal to 38C (100.4F), Mild Pain (1 - 3), Moderate Pain (4 - 6)  dextrose Oral Gel 15 Gram(s) Oral once PRN Blood Glucose LESS THAN 70 milliGRAM(s)/deciliter    Vital Signs Last 24 Hrs  T(F): 97.9 (09 Sep 2023 11:22), Max: 98.1 (09 Sep 2023 00:01)  HR: 89 (09 Sep 2023 11:22) (78 - 95)  BP: 109/74 (09 Sep 2023 11:22) (99/65 - 115/69)  RR: 19 (09 Sep 2023 11:22) (19 - 20)  SpO2: 97% (09 Sep 2023 11:22) (96% - 98%)  I&O's Summary    08 Sep 2023 07:01  -  09 Sep 2023 07:00  --------------------------------------------------------  IN: 0 mL / OUT: 1640 mL / NET: -1640 mL        PHYSICAL EXAM:  General: awake alert   Neck: supple, no JVD   Lungs: CTA bilateral   Cardio: RRR, S1/S2, No murmur  Abdomen: Soft, Nontender, Nondistended; Bowel sounds present  Extremities: No calf tenderness, No pitting edema    LABS:                        11.8   7.69  )-----------( 387      ( 08 Sep 2023 11:20 )             34.5     09-08    128  |  94  |  11.3  ----------------------------<  190  4.2   |  20.0  |  0.82    Ca    8.6      08 Sep 2023 11:20    TPro  6.2  /  Alb  3.0  /  TBili  0.3  /  DBili  x   /  AST  21  /  ALT  14  /  AlkPhos  110  09-08          PT/INR - ( 08 Sep 2023 11:20 )   PT: 14.4 sec;   INR: 1.31 ratio         PTT - ( 08 Sep 2023 11:20 )  PTT:32.2 sec                        POCT Blood Glucose.: 138 mg/dL (09 Sep 2023 13:43)  POCT Blood Glucose.: 189 mg/dL (09 Sep 2023 08:46)  POCT Blood Glucose.: 145 mg/dL (08 Sep 2023 21:52)      Urinalysis Basic - ( 08 Sep 2023 11:20 )    Color: x / Appearance: x / SG: x / pH: x  Gluc: 190 mg/dL / Ketone: x  / Bili: x / Urobili: x   Blood: x / Protein: x / Nitrite: x   Leuk Esterase: x / RBC: x / WBC x   Sq Epi: x / Non Sq Epi: x / Bacteria: x          RADIOLOGY & ADDITIONAL TESTS:

## 2023-09-09 NOTE — DISCHARGE NOTE PROVIDER - NSDCFUSCHEDAPPT_GEN_ALL_CORE_FT
Светлана Briseno  [facility] Physician [facility]  [address]STR[address] [facility]  Scheduled Appointment: 09/11/2023    Cliff Gregory  [facility] Physician [facility]  UROLOGY [address] [facility]  Scheduled Appointment: 09/12/2023

## 2023-09-09 NOTE — DISCHARGE NOTE PROVIDER - NSDCMRMEDTOKEN_GEN_ALL_CORE_FT
amLODIPine 5 mg oral tablet: 1 orally once a day  Eliquis 5 mg oral tablet: 1 tab(s) orally 2 times a day Take 10 mg Twice Daily (2 tablets) until 9/1, then begin taking 5 mg twice daily (1 tablet)  Lannikkieus Solostar Pen 100 units/mL subcutaneous solution: 10 unit(s) subcutaneous once a day  Metoprolol Tartrate 25 mg oral tablet: 1 tab(s) orally 2 times a day  olmesartan 40 mg oral tablet: 1 tab(s) orally once a day  Sodium Chloride 1 g oral tablet: 1 tab(s) orally 2 times a day   Eliquis 5 mg oral tablet: 1 tab(s) orally 2 times a day Take 10 mg Twice Daily (2 tablets) until 9/1, then begin taking 5 mg twice daily (1 tablet)  Metoprolol Tartrate 25 mg oral tablet: 1 tab(s) orally 2 times a day  Sodium Chloride 1 g oral tablet: 1 tab(s) orally 2 times a day

## 2023-09-09 NOTE — DISCHARGE NOTE PROVIDER - ATTENDING DISCHARGE PHYSICAL EXAMINATION:
feeling well , no acute complaints   labs reviewed   d/w son re discharge and plan   Vital Signs Last 24 Hrs  T(C): 36.3 (10 Sep 2023 07:53), Max: 36.9 (09 Sep 2023 23:28)  T(F): 97.4 (10 Sep 2023 07:53), Max: 98.4 (09 Sep 2023 23:28)  HR: 67 (10 Sep 2023 07:53) (67 - 92)  BP: 103/70 (10 Sep 2023 07:53) (102/66 - 120/80)  BP(mean): 86 (09 Sep 2023 11:22) (86 - 86)  RR: 18 (10 Sep 2023 07:53) (18 - 19)  SpO2: 96% (10 Sep 2023 07:53) (96% - 99%)    Parameters below as of 10 Sep 2023 07:53  Patient On (Oxygen Delivery Method): room air

## 2023-09-09 NOTE — DISCHARGE NOTE PROVIDER - HOSPITAL COURSE
68 yo M w/ hx DM2, left hydronephrosis post nephro tube, peritoneal carcinomatosis planned for chemo initiation, recent dc for DVT/PE post mechanical thrombectomy, started on eliquis, planned for paracentesis electively next week presents for progressive abdominal distension.  Noted also HER and orthopnea. denies fevers/chills or chest pain. took eliquis 5mg in am day of admission ,IR consulted paracentesis performed per protocol iv albumin given   restarted eliquis , BP is soft medications adjusted stopped losartan and amlodipine , na is improving

## 2023-09-09 NOTE — DISCHARGE NOTE PROVIDER - CARE PROVIDER_API CALL
Cliff Gregory  Urology  [address] [place] [address]  Phone: [phone]  Fax: [phone]  Follow Up Time: 1-3 days

## 2023-09-09 NOTE — DISCHARGE NOTE PROVIDER - NSDCCPCAREPLAN_GEN_ALL_CORE_FT
PRINCIPAL DISCHARGE DIAGNOSIS  Diagnosis: Ascites  Assessment and Plan of Treatment: due to cancer , s/p paracentesis      SECONDARY DISCHARGE DIAGNOSES  Diagnosis: Urethral cancer  Assessment and Plan of Treatment:

## 2023-09-09 NOTE — DISCHARGE NOTE PROVIDER - CARE PROVIDERS DIRECT ADDRESSES
[email or web address]

## 2023-09-09 NOTE — PROGRESS NOTE ADULT - ASSESSMENT
68 yo M w/ hx DM2, left hydronephrosis post nephro tube, peritoneal carcinomatosis planned for chemo initiation, recent dc for DVT/PE post mechanical thrombectomy, started on eliquis, planned for paracentesis electively next week presents for progressive abdominal distension.  Noted also HER and orthopnea. denies fevers/chills or chest pain. took eliquis 5mg this am.       1- Ascites due to malignancy   peritoneal carcinomatosis  s/p paracentesis by IR . resume eliquis   outpatient f/u for chemo initiation  add diuretiics on DC     2-hx dvt/PE  resume AC eliquis     3-dm2: sliding scale/ lantus    4-h/o HTN  BP is low soft   will stop norvasc/ losartan home meds   d/w son above plan     5-hyponatremia; likely 2/2 hypervolemia   nacl tabs on hold   monitor     home soon  66 yo M w/ hx DM2, left hydronephrosis post nephro tube, peritoneal carcinomatosis planned for chemo initiation, recent dc for DVT/PE post mechanical thrombectomy, started on eliquis, planned for paracentesis electively next week presents for progressive abdominal distension.  Noted also HER and orthopnea. denies fevers/chills or chest pain. took eliquis 5mg this am.       1- Ascites due to malignancy   peritoneal carcinomatosis  s/p paracentesis by IR . resume eliquis   outpatient f/u for chemo initiation    may need recurrent paracentesis     2-hx dvt/PE  resume AC eliquis     3-dm2: sliding scale/ lantus    4-h/o HTN  BP is low soft   will stop norvasc/ losartan home meds   d/w son above plan     5-hyponatremia; likely 2/2 hypervolemia   nacl tabs on hold   monitor     home soon

## 2023-09-10 ENCOUNTER — TRANSCRIPTION ENCOUNTER (OUTPATIENT)
Age: 68
End: 2023-09-10

## 2023-09-10 VITALS
HEART RATE: 79 BPM | TEMPERATURE: 98 F | OXYGEN SATURATION: 98 % | RESPIRATION RATE: 18 BRPM | DIASTOLIC BLOOD PRESSURE: 66 MMHG | SYSTOLIC BLOOD PRESSURE: 100 MMHG

## 2023-09-10 LAB
APPEARANCE UR: ABNORMAL
BACTERIA # UR AUTO: ABNORMAL
BILIRUB UR-MCNC: NEGATIVE — SIGNIFICANT CHANGE UP
COLOR SPEC: YELLOW — SIGNIFICANT CHANGE UP
DIFF PNL FLD: ABNORMAL
EPI CELLS # UR: SIGNIFICANT CHANGE UP
GLUCOSE BLDC GLUCOMTR-MCNC: 161 MG/DL — HIGH (ref 70–99)
GLUCOSE BLDC GLUCOMTR-MCNC: 94 MG/DL — SIGNIFICANT CHANGE UP (ref 70–99)
GLUCOSE UR QL: NEGATIVE — SIGNIFICANT CHANGE UP
KETONES UR-MCNC: NEGATIVE — SIGNIFICANT CHANGE UP
LEUKOCYTE ESTERASE UR-ACNC: ABNORMAL
NITRITE UR-MCNC: POSITIVE
PH UR: 6.5 — SIGNIFICANT CHANGE UP (ref 5–8)
PROT UR-MCNC: 15
RBC CASTS # UR COMP ASSIST: SIGNIFICANT CHANGE UP /HPF (ref 0–4)
SP GR SPEC: 1.01 — SIGNIFICANT CHANGE UP (ref 1.01–1.02)
UROBILINOGEN FLD QL: 1
WBC UR QL: ABNORMAL /HPF (ref 0–5)

## 2023-09-10 PROCEDURE — 81001 URINALYSIS AUTO W/SCOPE: CPT

## 2023-09-10 PROCEDURE — 84484 ASSAY OF TROPONIN QUANT: CPT

## 2023-09-10 PROCEDURE — 87186 SC STD MICRODIL/AGAR DIL: CPT

## 2023-09-10 PROCEDURE — 93005 ELECTROCARDIOGRAM TRACING: CPT

## 2023-09-10 PROCEDURE — 36415 COLL VENOUS BLD VENIPUNCTURE: CPT

## 2023-09-10 PROCEDURE — 85610 PROTHROMBIN TIME: CPT

## 2023-09-10 PROCEDURE — 85025 COMPLETE CBC W/AUTO DIFF WBC: CPT

## 2023-09-10 PROCEDURE — 80048 BASIC METABOLIC PNL TOTAL CA: CPT

## 2023-09-10 PROCEDURE — 82962 GLUCOSE BLOOD TEST: CPT

## 2023-09-10 PROCEDURE — 99285 EMERGENCY DEPT VISIT HI MDM: CPT | Mod: 25

## 2023-09-10 PROCEDURE — 99239 HOSP IP/OBS DSCHRG MGMT >30: CPT

## 2023-09-10 PROCEDURE — C1729: CPT

## 2023-09-10 PROCEDURE — 83880 ASSAY OF NATRIURETIC PEPTIDE: CPT

## 2023-09-10 PROCEDURE — 87086 URINE CULTURE/COLONY COUNT: CPT

## 2023-09-10 PROCEDURE — 85730 THROMBOPLASTIN TIME PARTIAL: CPT

## 2023-09-10 PROCEDURE — T1013: CPT

## 2023-09-10 PROCEDURE — 71045 X-RAY EXAM CHEST 1 VIEW: CPT

## 2023-09-10 PROCEDURE — 80053 COMPREHEN METABOLIC PANEL: CPT

## 2023-09-10 PROCEDURE — P9047: CPT

## 2023-09-10 RX ORDER — AMLODIPINE BESYLATE 2.5 MG/1
1 TABLET ORAL
Refills: 0 | DISCHARGE

## 2023-09-10 RX ORDER — CEFTRIAXONE 500 MG/1
1000 INJECTION, POWDER, FOR SOLUTION INTRAMUSCULAR; INTRAVENOUS EVERY 24 HOURS
Refills: 0 | Status: DISCONTINUED | OUTPATIENT
Start: 2023-09-10 | End: 2023-09-10

## 2023-09-10 RX ORDER — OLMESARTAN MEDOXOMIL 5 MG/1
1 TABLET, FILM COATED ORAL
Refills: 0 | DISCHARGE

## 2023-09-10 RX ORDER — POLYETHYLENE GLYCOL 3350 17 G/17G
17 POWDER, FOR SOLUTION ORAL ONCE
Refills: 0 | Status: COMPLETED | OUTPATIENT
Start: 2023-09-10 | End: 2023-09-10

## 2023-09-10 RX ADMIN — SODIUM CHLORIDE 1 GRAM(S): 9 INJECTION INTRAMUSCULAR; INTRAVENOUS; SUBCUTANEOUS at 06:00

## 2023-09-10 RX ADMIN — CEFTRIAXONE 1000 MILLIGRAM(S): 500 INJECTION, POWDER, FOR SOLUTION INTRAMUSCULAR; INTRAVENOUS at 06:00

## 2023-09-10 RX ADMIN — POLYETHYLENE GLYCOL 3350 17 GRAM(S): 17 POWDER, FOR SOLUTION ORAL at 11:49

## 2023-09-10 RX ADMIN — Medication 25 MILLIGRAM(S): at 06:00

## 2023-09-10 RX ADMIN — APIXABAN 2.5 MILLIGRAM(S): 2.5 TABLET, FILM COATED ORAL at 06:00

## 2023-09-10 NOTE — DISCHARGE NOTE NURSING/CASE MANAGEMENT/SOCIAL WORK - NSTRANSFERBELONGINGSDISPO_GEN_A_NUR
Radhames Jacob   [place], [address]   TRANSESOPHAGEAL ECHO       Name:  Alex Haddad   MR#:  [id number]   :  1962   Account #:  [de-identified]        Date of Adm:  2017       INDICATIONS: The transesophageal echocardiographic exam was   requested by the surgeon in order to evaluate real time cardiac and   valvular form and function in a patient with coronary artery disease and   ischemic cardiomyopathy. STUDY: The transesophageal echocardiographic probe was easily and   atraumatically inserted into the patient's esophagus while the patient   was sedated inside the operating room under general anesthesia. Modalities incorporated included 2D, 3D, color flow mode, pulsed-wave   Doppler, and continuous wave Doppler. The epiaortic exam was requested by the surgeon in order to evaluate   the aorta for a cross-clamp and bypass site location. AORTA   ASCENDING AORTA: The patient's ascending aorta measured 3.15   cm in diameter. There was no evidence of dissection and there was   minimal and nonmobile plaque. AORTIC ARCH: The aortic arch measured 2.25 cm in diameter. There   was no evidence of dissection and there was minimal and nonmobile   plaque. DESCENDING AORTA: The descending aorta measured 1.96 cm in   diameter. There was no evidence of dissection. There was minimal   and nonmobile plaque. An intraaortic balloon pump was visualized   below the subclavian artery. VALVES   AORTIC VALVE: The aortic valve annulus measured 2.6 cm. There was no aortic valve stenosis. Maximum velocity through the   valve was 106 per second. The peak gradient was 5 mmHg. The mean   gradient was 2 mmHg. There was no aortic insufficiency, and the aortic   leaflet morphology and motion were both normal.      MITRAL VALVE: Mitral valve annulus measured 3.7 cm. There was no   mitral valve stenosis.  There was trace centrally located and directed   mitral regurgitation. Mitral leaflet morphology and motion were both   normal.     TRICUSPID VALVE: The tricuspid valve annulus measured 2.5 cm. There was no tricuspid stenosis. There was trace tricuspid   regurgitation. The tricuspid leaflet morphology and motion were both   normal.     PULMONIC VALVE: The pulmonic valve annulus measured 1.9 cm. There was no pulmonic stenosis. There was no pulmonic regurgitation. The pulmonic leaflet morphology and motion were both grossly normal.     ATRIA   RIGHT ATRIUM: The right atrial size was 3.6 cm. There was no   spontaneous echo contrast. There was no right atrial thrombus or   tumor. A pulmonary artery catheter was visualized. LEFT ATRIUM: The left atrial size was 4 cm. There was no   spontaneous echo contrast. There was no left atrial thrombus or tumor. No device was visualized. LEFT ATRIAL APPENDAGE: There was no thrombus visualized within   the left atrial appendage. Pulsed wave Doppler within the appendage   measured 43 cm/sec. INTERATRIAL SEPTUM: The interatrial septal morphology was   normal. There was no patent foramen ovale with color-flow mode. VENTRICLES   RIGHT VENTRICLE: The right ventricular major axis was upper   normal. There was no right ventricular hypertrophy. There was no right   ventricular thrombus, and the right ventricular ejection fraction was   normal.     LEFT VENTRICLE: The left ventricular major axis was 10.9 cm. The   left ventricle was dilated. There was no left ventricular hypertrophy. The inferior wall thickness was 1 cm. There was no left ventricular   thrombus, and the left ventricular ejection fraction was calculated at   20%. INTERVENTRICULAR SEPTUM: The interventricular septum   morphology was normal.     REGIONAL FUNCTION: There was global hypokinesis.      PERICARDIUM: The pericardium was normal.     PLEURA: The pleura were normal.     POST INTERVENTION FOLLOWUP STUDY: After coronary artery   bypass grafting on cardiopulmonary bypass of 3 vessels, the left   ventricular ejection fraction remained at 20%. The right ventricular   function was preserved. There was trace mitral regurgitation. There   was trace tricuspid regurgitation. There was no aortic or pulmonic   insufficiency. The patient's intraaortic balloon pump was unchanged in   position. There were no effusions. The patient's ascending aorta was   intact. These results were discussed and viewed with the cardiac   surgeon. The transesophageal echocardiographic probe was easily   and atraumatically removed from the patient's esophagus. The patient   tolerated the procedure without event.         Lin Ruiz MD      TV / Tracy Ching   D:  09/29/2017   14:55   T:  09/29/2017   15:10   Job #:  [id number] with patient

## 2023-09-10 NOTE — CHART NOTE - NSCHARTNOTEFT_GEN_A_CORE
PA NOTE-MEDICINE    UA Resulted Positive    Urine Cx sent   Rx'd Rocephin 1GM IVPB Daily x 3 Days
Nutrition consult for MST score >2, pt discharged, lunch tray provided and did not eat. RD cannot complete consult

## 2023-09-10 NOTE — DISCHARGE NOTE NURSING/CASE MANAGEMENT/SOCIAL WORK - PATIENT PORTAL LINK FT
You can access the FollowMyHealth Patient Portal offered by [facility] by registering at the following website: [email or web address]. By joining [facility]’s FollowMyHealth portal, you will also be able to view your health information using other applications (apps) compatible with our system.

## 2023-09-10 NOTE — DISCHARGE NOTE NURSING/CASE MANAGEMENT/SOCIAL WORK - NSDCPEFALRISK_GEN_ALL_CORE
For information on Fall & Injury Prevention, visit: [email or web address] OR  [email or web address] OR  https://www.cdc.gov/steadi/patient.html

## 2023-09-11 ENCOUNTER — APPOINTMENT (OUTPATIENT)
Dept: GASTROENTEROLOGY | Facility: CLINIC | Age: 68
End: 2023-09-11
Payer: MEDICARE

## 2023-09-11 ENCOUNTER — NON-APPOINTMENT (OUTPATIENT)
Age: 68
End: 2023-09-11

## 2023-09-11 VITALS
HEIGHT: 66 IN | RESPIRATION RATE: 15 BRPM | TEMPERATURE: 97.88 F | BODY MASS INDEX: 24.75 KG/M2 | SYSTOLIC BLOOD PRESSURE: 73 MMHG | DIASTOLIC BLOOD PRESSURE: 51 MMHG | HEART RATE: 86 BPM | WEIGHT: 154 LBS | OXYGEN SATURATION: 96 %

## 2023-09-11 DIAGNOSIS — R18.8 OTHER ASCITES: ICD-10-CM

## 2023-09-11 PROCEDURE — 99215 OFFICE O/P EST HI 40 MIN: CPT

## 2023-09-11 RX ORDER — AMLODIPINE BESYLATE 5 MG/1
5 TABLET ORAL DAILY
Qty: 30 | Refills: 0 | Status: DISCONTINUED | COMMUNITY
Start: 2023-08-10 | End: 2023-09-11

## 2023-09-11 RX ORDER — OLMESARTAN MEDOXOMIL 40 MG/1
40 TABLET, FILM COATED ORAL DAILY
Qty: 30 | Refills: 0 | Status: DISCONTINUED | COMMUNITY
Start: 2023-08-10 | End: 2023-09-11

## 2023-09-12 ENCOUNTER — APPOINTMENT (OUTPATIENT)
Dept: UROLOGY | Facility: CLINIC | Age: 68
End: 2023-09-12
Payer: MEDICARE

## 2023-09-12 VITALS — HEART RATE: 87 BPM | DIASTOLIC BLOOD PRESSURE: 71 MMHG | SYSTOLIC BLOOD PRESSURE: 101 MMHG

## 2023-09-12 LAB
ALBUMIN SERPL ELPH-MCNC: 3.6 G/DL
ALP BLD-CCNC: 128 U/L
ALT SERPL-CCNC: 19 U/L
ANION GAP SERPL CALC-SCNC: 14 MMOL/L
AST SERPL-CCNC: 25 U/L
BILIRUB SERPL-MCNC: 0.3 MG/DL
BUN SERPL-MCNC: 11 MG/DL
CALCIUM SERPL-MCNC: 9.3 MG/DL
CHLORIDE SERPL-SCNC: 96 MMOL/L
CO2 SERPL-SCNC: 22 MMOL/L
CREAT SERPL-MCNC: 0.94 MG/DL
EGFR: 90 ML/MIN/1.73M2
GLUCOSE SERPL-MCNC: 151 MG/DL
POTASSIUM SERPL-SCNC: 4.6 MMOL/L
PROT SERPL-MCNC: 6.7 G/DL
SODIUM SERPL-SCNC: 132 MMOL/L

## 2023-09-12 PROCEDURE — 99212 OFFICE O/P EST SF 10 MIN: CPT

## 2023-09-12 RX ORDER — GENTAMICIN SULFATE 40 MG/ML
40 INJECTION, SOLUTION INTRAMUSCULAR; INTRAVENOUS
Qty: 2 | Refills: 0 | Status: DISCONTINUED | COMMUNITY
Start: 2023-09-05 | End: 2023-09-12

## 2023-09-12 RX ORDER — METOPROLOL TARTRATE 25 MG/1
25 TABLET, FILM COATED ORAL
Refills: 0 | Status: ACTIVE | COMMUNITY

## 2023-09-14 LAB — SURGICAL PATHOLOGY STUDY: SIGNIFICANT CHANGE UP

## 2023-09-14 RX ORDER — SPIRONOLACTONE 50 MG/1
50 TABLET ORAL
Qty: 30 | Refills: 0 | Status: DISCONTINUED | COMMUNITY
Start: 2023-09-11 | End: 2023-09-14

## 2023-09-15 LAB
-  AMIKACIN: SIGNIFICANT CHANGE UP
-  AMOXICILLIN/CLAVULANIC ACID: SIGNIFICANT CHANGE UP
-  AMPICILLIN/SULBACTAM: SIGNIFICANT CHANGE UP
-  AMPICILLIN: SIGNIFICANT CHANGE UP
-  AZTREONAM: SIGNIFICANT CHANGE UP
-  CEFAZOLIN: SIGNIFICANT CHANGE UP
-  CEFEPIME: SIGNIFICANT CHANGE UP
-  CEFOXITIN: SIGNIFICANT CHANGE UP
-  CEFTRIAXONE: SIGNIFICANT CHANGE UP
-  CEFUROXIME: SIGNIFICANT CHANGE UP
-  CIPROFLOXACIN: SIGNIFICANT CHANGE UP
-  ERTAPENEM: SIGNIFICANT CHANGE UP
-  GENTAMICIN: SIGNIFICANT CHANGE UP
-  IMIPENEM: SIGNIFICANT CHANGE UP
-  LEVOFLOXACIN: SIGNIFICANT CHANGE UP
-  MEROPENEM: SIGNIFICANT CHANGE UP
-  NITROFURANTOIN: SIGNIFICANT CHANGE UP
-  PIPERACILLIN/TAZOBACTAM: SIGNIFICANT CHANGE UP
-  RESISTANCE GENE NDM: SIGNIFICANT CHANGE UP
-  TOBRAMYCIN: SIGNIFICANT CHANGE UP
-  TRIMETHOPRIM/SULFAMETHOXAZOLE: SIGNIFICANT CHANGE UP
CULTURE RESULTS: SIGNIFICANT CHANGE UP
METHOD TYPE: SIGNIFICANT CHANGE UP
METHOD TYPE: SIGNIFICANT CHANGE UP
ORGANISM # SPEC MICROSCOPIC CNT: SIGNIFICANT CHANGE UP
SPECIMEN SOURCE: SIGNIFICANT CHANGE UP

## 2023-09-20 ENCOUNTER — OUTPATIENT (OUTPATIENT)
Dept: OUTPATIENT SERVICES | Facility: HOSPITAL | Age: 68
LOS: 1 days | End: 2023-09-20

## 2023-09-20 ENCOUNTER — APPOINTMENT (OUTPATIENT)
Dept: INTERVENTIONAL RADIOLOGY/VASCULAR | Facility: CLINIC | Age: 68
End: 2023-09-20
Payer: MEDICARE

## 2023-09-20 VITALS
DIASTOLIC BLOOD PRESSURE: 91 MMHG | TEMPERATURE: 206.78 F | SYSTOLIC BLOOD PRESSURE: 127 MMHG | HEART RATE: 94 BPM | RESPIRATION RATE: 16 BRPM | OXYGEN SATURATION: 99 %

## 2023-09-20 DIAGNOSIS — Z90.49 ACQUIRED ABSENCE OF OTHER SPECIFIED PARTS OF DIGESTIVE TRACT: Chronic | ICD-10-CM

## 2023-09-20 DIAGNOSIS — C68.9 MALIGNANT NEOPLASM OF URINARY ORGAN, UNSPECIFIED: ICD-10-CM

## 2023-09-20 DIAGNOSIS — Z98.890 OTHER SPECIFIED POSTPROCEDURAL STATES: Chronic | ICD-10-CM

## 2023-09-20 PROCEDURE — 77001 FLUOROGUIDE FOR VEIN DEVICE: CPT | Mod: 26,XU

## 2023-09-20 PROCEDURE — 36561 INSERT TUNNELED CV CATH: CPT | Mod: RT

## 2023-09-20 PROCEDURE — 49083 ABD PARACENTESIS W/IMAGING: CPT

## 2023-09-25 ENCOUNTER — RESULT REVIEW (OUTPATIENT)
Age: 68
End: 2023-09-25

## 2023-09-25 ENCOUNTER — APPOINTMENT (OUTPATIENT)
Age: 68
End: 2023-09-25

## 2023-09-25 LAB
ALBUMIN SERPL ELPH-MCNC: 2.7 G/DL — LOW (ref 3.3–5)
ALP SERPL-CCNC: 169 U/L — HIGH (ref 40–120)
ALT FLD-CCNC: 15 U/L — SIGNIFICANT CHANGE UP (ref 10–45)
ANION GAP SERPL CALC-SCNC: 9 MMOL/L — SIGNIFICANT CHANGE UP (ref 5–17)
AST SERPL-CCNC: 28 U/L — SIGNIFICANT CHANGE UP (ref 10–40)
BILIRUB SERPL-MCNC: 0.3 MG/DL — SIGNIFICANT CHANGE UP (ref 0.2–1.2)
BUN SERPL-MCNC: 9 MG/DL — SIGNIFICANT CHANGE UP (ref 7–23)
CALCIUM SERPL-MCNC: 8.6 MG/DL — SIGNIFICANT CHANGE UP (ref 8.4–10.5)
CHLORIDE SERPL-SCNC: 97 MMOL/L — SIGNIFICANT CHANGE UP (ref 96–108)
CO2 SERPL-SCNC: 25 MMOL/L — SIGNIFICANT CHANGE UP (ref 22–31)
CREAT SERPL-MCNC: 0.93 MG/DL — SIGNIFICANT CHANGE UP (ref 0.5–1.3)
EGFR: 91 ML/MIN/1.73M2 — SIGNIFICANT CHANGE UP
GLUCOSE SERPL-MCNC: 212 MG/DL — HIGH (ref 70–99)
MAGNESIUM SERPL-MCNC: 2 MG/DL — SIGNIFICANT CHANGE UP (ref 1.6–2.6)
POTASSIUM SERPL-MCNC: 4.1 MMOL/L — SIGNIFICANT CHANGE UP (ref 3.5–5.3)
POTASSIUM SERPL-SCNC: 4.1 MMOL/L — SIGNIFICANT CHANGE UP (ref 3.5–5.3)
PROT SERPL-MCNC: 6 G/DL — SIGNIFICANT CHANGE UP (ref 6–8.3)
SODIUM SERPL-SCNC: 132 MMOL/L — LOW (ref 135–145)

## 2023-09-26 DIAGNOSIS — Z51.11 ENCOUNTER FOR ANTINEOPLASTIC CHEMOTHERAPY: ICD-10-CM

## 2023-09-26 DIAGNOSIS — C68.9 MALIGNANT NEOPLASM OF URINARY ORGAN, UNSPECIFIED: ICD-10-CM

## 2023-09-26 DIAGNOSIS — R11.2 NAUSEA WITH VOMITING, UNSPECIFIED: ICD-10-CM

## 2023-09-26 LAB
HBV CORE AB SER-ACNC: SIGNIFICANT CHANGE UP
HBV SURFACE AB SER-ACNC: SIGNIFICANT CHANGE UP
HBV SURFACE AG SER-ACNC: SIGNIFICANT CHANGE UP
HCV AB S/CO SERPL IA: 0.12 S/CO — SIGNIFICANT CHANGE UP (ref 0–0.99)
HCV AB SERPL-IMP: SIGNIFICANT CHANGE UP

## 2023-10-02 ENCOUNTER — RESULT REVIEW (OUTPATIENT)
Age: 68
End: 2023-10-02

## 2023-10-02 ENCOUNTER — APPOINTMENT (OUTPATIENT)
Age: 68
End: 2023-10-02

## 2023-10-02 LAB
BASOPHILS # BLD AUTO: 0 K/UL — SIGNIFICANT CHANGE UP (ref 0–0.2)
BASOPHILS NFR BLD AUTO: 0.7 % — SIGNIFICANT CHANGE UP (ref 0–2)
BUN SERPL-MCNC: 8 MG/DL — SIGNIFICANT CHANGE UP (ref 7–23)
CA-I BLDA-SCNC: 1.16 MMOL/L — SIGNIFICANT CHANGE UP (ref 1.12–1.3)
CHLORIDE SERPL-SCNC: 95 MMOL/L — LOW (ref 96–108)
CO2 SERPL-SCNC: 24 MMOL/L — SIGNIFICANT CHANGE UP (ref 22–31)
CREAT SERPL-MCNC: 0.7 MG/DL — SIGNIFICANT CHANGE UP (ref 0.5–1.3)
EOSINOPHIL # BLD AUTO: 0.1 K/UL — SIGNIFICANT CHANGE UP (ref 0–0.5)
EOSINOPHIL NFR BLD AUTO: 1.7 % — SIGNIFICANT CHANGE UP (ref 0–6)
GLUCOSE SERPL-MCNC: 259 MG/DL — HIGH (ref 70–99)
HCT VFR BLD CALC: 37.2 % — LOW (ref 39–50)
HGB BLD-MCNC: 12.3 G/DL — LOW (ref 13–17)
LYMPHOCYTES # BLD AUTO: 0.9 K/UL — LOW (ref 1–3.3)
LYMPHOCYTES # BLD AUTO: 21.7 % — SIGNIFICANT CHANGE UP (ref 13–44)
MCHC RBC-ENTMCNC: 28.5 PG — SIGNIFICANT CHANGE UP (ref 27–34)
MCHC RBC-ENTMCNC: 33.1 G/DL — SIGNIFICANT CHANGE UP (ref 32–36)
MCV RBC AUTO: 86 FL — SIGNIFICANT CHANGE UP (ref 80–100)
MONOCYTES # BLD AUTO: 0.3 K/UL — SIGNIFICANT CHANGE UP (ref 0–0.9)
MONOCYTES NFR BLD AUTO: 8.4 % — SIGNIFICANT CHANGE UP (ref 2–14)
NEUTROPHILS # BLD AUTO: 2.7 K/UL — SIGNIFICANT CHANGE UP (ref 1.8–7.4)
NEUTROPHILS NFR BLD AUTO: 67.5 % — SIGNIFICANT CHANGE UP (ref 43–77)
PLATELET # BLD AUTO: 216 K/UL — SIGNIFICANT CHANGE UP (ref 150–400)
POTASSIUM SERPL-MCNC: 4.2 MMOL/L — SIGNIFICANT CHANGE UP (ref 3.5–5.3)
POTASSIUM SERPL-SCNC: 4.2 MMOL/L — SIGNIFICANT CHANGE UP (ref 3.5–5.3)
RBC # BLD: 4.32 M/UL — SIGNIFICANT CHANGE UP (ref 4.2–5.8)
RBC # FLD: 12 % — SIGNIFICANT CHANGE UP (ref 10.3–14.5)
SODIUM SERPL-SCNC: 132 MMOL/L — LOW (ref 135–145)
WBC # BLD: 3.9 K/UL — SIGNIFICANT CHANGE UP (ref 3.8–10.5)
WBC # FLD AUTO: 3.9 K/UL — SIGNIFICANT CHANGE UP (ref 3.8–10.5)

## 2023-10-03 ENCOUNTER — NON-APPOINTMENT (OUTPATIENT)
Age: 68
End: 2023-10-03

## 2023-10-05 ENCOUNTER — RX CHANGE (OUTPATIENT)
Age: 68
End: 2023-10-05

## 2023-10-05 RX ORDER — FUROSEMIDE 20 MG/1
20 TABLET ORAL
Qty: 30 | Refills: 0 | Status: DISCONTINUED | COMMUNITY
Start: 2023-09-11 | End: 2023-10-05

## 2023-10-11 ENCOUNTER — RESULT REVIEW (OUTPATIENT)
Age: 68
End: 2023-10-11

## 2023-10-11 ENCOUNTER — APPOINTMENT (OUTPATIENT)
Dept: INTERVENTIONAL RADIOLOGY/VASCULAR | Facility: CLINIC | Age: 68
End: 2023-10-11
Payer: MEDICARE

## 2023-10-11 ENCOUNTER — OUTPATIENT (OUTPATIENT)
Dept: OUTPATIENT SERVICES | Facility: HOSPITAL | Age: 68
LOS: 1 days | End: 2023-10-11

## 2023-10-11 DIAGNOSIS — Z98.890 OTHER SPECIFIED POSTPROCEDURAL STATES: Chronic | ICD-10-CM

## 2023-10-11 DIAGNOSIS — Z90.49 ACQUIRED ABSENCE OF OTHER SPECIFIED PARTS OF DIGESTIVE TRACT: Chronic | ICD-10-CM

## 2023-10-11 DIAGNOSIS — R18.8 OTHER ASCITES: ICD-10-CM

## 2023-10-11 PROCEDURE — 76705 ECHO EXAM OF ABDOMEN: CPT | Mod: 26

## 2023-10-13 ENCOUNTER — RESULT REVIEW (OUTPATIENT)
Age: 68
End: 2023-10-13

## 2023-10-13 ENCOUNTER — APPOINTMENT (OUTPATIENT)
Dept: HEMATOLOGY ONCOLOGY | Facility: CLINIC | Age: 68
End: 2023-10-13
Payer: MEDICARE

## 2023-10-13 VITALS
HEIGHT: 66 IN | OXYGEN SATURATION: 100 % | DIASTOLIC BLOOD PRESSURE: 88 MMHG | HEART RATE: 79 BPM | BODY MASS INDEX: 23.31 KG/M2 | SYSTOLIC BLOOD PRESSURE: 133 MMHG | WEIGHT: 145.04 LBS | TEMPERATURE: 98.6 F

## 2023-10-13 LAB
BASOPHILS # BLD AUTO: 0.1 K/UL — SIGNIFICANT CHANGE UP (ref 0–0.2)
BASOPHILS NFR BLD AUTO: 1.6 % — SIGNIFICANT CHANGE UP (ref 0–2)
EOSINOPHIL # BLD AUTO: 0.1 K/UL — SIGNIFICANT CHANGE UP (ref 0–0.5)
EOSINOPHIL NFR BLD AUTO: 2.8 % — SIGNIFICANT CHANGE UP (ref 0–6)
HCT VFR BLD CALC: 35.8 % — LOW (ref 39–50)
HGB BLD-MCNC: 11.7 G/DL — LOW (ref 13–17)
LYMPHOCYTES # BLD AUTO: 1.1 K/UL — SIGNIFICANT CHANGE UP (ref 1–3.3)
LYMPHOCYTES # BLD AUTO: 30.7 % — SIGNIFICANT CHANGE UP (ref 13–44)
MCHC RBC-ENTMCNC: 28 PG — SIGNIFICANT CHANGE UP (ref 27–34)
MCHC RBC-ENTMCNC: 32.7 G/DL — SIGNIFICANT CHANGE UP (ref 32–36)
MCV RBC AUTO: 85.7 FL — SIGNIFICANT CHANGE UP (ref 80–100)
MONOCYTES # BLD AUTO: 0.4 K/UL — SIGNIFICANT CHANGE UP (ref 0–0.9)
MONOCYTES NFR BLD AUTO: 12.7 % — SIGNIFICANT CHANGE UP (ref 2–14)
NEUTROPHILS # BLD AUTO: 1.8 K/UL — SIGNIFICANT CHANGE UP (ref 1.8–7.4)
NEUTROPHILS NFR BLD AUTO: 52.2 % — SIGNIFICANT CHANGE UP (ref 43–77)
PLATELET # BLD AUTO: 412 K/UL — HIGH (ref 150–400)
RBC # BLD: 4.18 M/UL — LOW (ref 4.2–5.8)
RBC # FLD: 12.5 % — SIGNIFICANT CHANGE UP (ref 10.3–14.5)
WBC # BLD: 3.5 K/UL — LOW (ref 3.8–10.5)
WBC # FLD AUTO: 3.5 K/UL — LOW (ref 3.8–10.5)

## 2023-10-13 PROCEDURE — 99215 OFFICE O/P EST HI 40 MIN: CPT

## 2023-10-13 RX ORDER — OLANZAPINE 2.5 MG/1
2.5 TABLET, FILM COATED ORAL
Qty: 32 | Refills: 0 | Status: ACTIVE | COMMUNITY
Start: 2023-09-21 | End: 1900-01-01

## 2023-10-16 ENCOUNTER — APPOINTMENT (OUTPATIENT)
Age: 68
End: 2023-10-16

## 2023-10-16 ENCOUNTER — RESULT REVIEW (OUTPATIENT)
Age: 68
End: 2023-10-16

## 2023-10-16 LAB
BASOPHILS # BLD AUTO: 0.1 K/UL — SIGNIFICANT CHANGE UP (ref 0–0.2)
BASOPHILS NFR BLD AUTO: 1.5 % — SIGNIFICANT CHANGE UP (ref 0–2)
EOSINOPHIL # BLD AUTO: 0 K/UL — SIGNIFICANT CHANGE UP (ref 0–0.5)
EOSINOPHIL NFR BLD AUTO: 1 % — SIGNIFICANT CHANGE UP (ref 0–6)
HCT VFR BLD CALC: 36.2 % — LOW (ref 39–50)
HGB BLD-MCNC: 12 G/DL — LOW (ref 13–17)
LYMPHOCYTES # BLD AUTO: 1.1 K/UL — SIGNIFICANT CHANGE UP (ref 1–3.3)
LYMPHOCYTES # BLD AUTO: 30.5 % — SIGNIFICANT CHANGE UP (ref 13–44)
MCHC RBC-ENTMCNC: 28.6 PG — SIGNIFICANT CHANGE UP (ref 27–34)
MCHC RBC-ENTMCNC: 33.2 G/DL — SIGNIFICANT CHANGE UP (ref 32–36)
MCV RBC AUTO: 86.2 FL — SIGNIFICANT CHANGE UP (ref 80–100)
MONOCYTES # BLD AUTO: 0.6 K/UL — SIGNIFICANT CHANGE UP (ref 0–0.9)
MONOCYTES NFR BLD AUTO: 16.4 % — HIGH (ref 2–14)
NEUTROPHILS # BLD AUTO: 1.8 K/UL — SIGNIFICANT CHANGE UP (ref 1.8–7.4)
NEUTROPHILS NFR BLD AUTO: 50.6 % — SIGNIFICANT CHANGE UP (ref 43–77)
PLATELET # BLD AUTO: 620 K/UL — HIGH (ref 150–400)
RBC # BLD: 4.2 M/UL — SIGNIFICANT CHANGE UP (ref 4.2–5.8)
RBC # FLD: 14.6 % — HIGH (ref 10.3–14.5)
WBC # BLD: 3.6 K/UL — LOW (ref 3.8–10.5)
WBC # FLD AUTO: 3.6 K/UL — LOW (ref 3.8–10.5)

## 2023-10-17 LAB
ALBUMIN SERPL ELPH-MCNC: 3.6 G/DL — SIGNIFICANT CHANGE UP (ref 3.3–5)
ALBUMIN SERPL ELPH-MCNC: 3.6 G/DL — SIGNIFICANT CHANGE UP (ref 3.3–5)
ALP SERPL-CCNC: 141 U/L — HIGH (ref 40–120)
ALP SERPL-CCNC: 141 U/L — HIGH (ref 40–120)
ALT FLD-CCNC: 19 U/L — SIGNIFICANT CHANGE UP (ref 10–45)
ALT FLD-CCNC: 19 U/L — SIGNIFICANT CHANGE UP (ref 10–45)
ANION GAP SERPL CALC-SCNC: 14 MMOL/L — SIGNIFICANT CHANGE UP (ref 5–17)
ANION GAP SERPL CALC-SCNC: 14 MMOL/L — SIGNIFICANT CHANGE UP (ref 5–17)
AST SERPL-CCNC: 29 U/L — SIGNIFICANT CHANGE UP (ref 10–40)
AST SERPL-CCNC: 29 U/L — SIGNIFICANT CHANGE UP (ref 10–40)
BILIRUB SERPL-MCNC: 0.2 MG/DL — SIGNIFICANT CHANGE UP (ref 0.2–1.2)
BILIRUB SERPL-MCNC: 0.2 MG/DL — SIGNIFICANT CHANGE UP (ref 0.2–1.2)
BUN SERPL-MCNC: 9 MG/DL — SIGNIFICANT CHANGE UP (ref 7–23)
BUN SERPL-MCNC: 9 MG/DL — SIGNIFICANT CHANGE UP (ref 7–23)
CALCIUM SERPL-MCNC: 9.2 MG/DL — SIGNIFICANT CHANGE UP (ref 8.4–10.5)
CALCIUM SERPL-MCNC: 9.2 MG/DL — SIGNIFICANT CHANGE UP (ref 8.4–10.5)
CHLORIDE SERPL-SCNC: 99 MMOL/L — SIGNIFICANT CHANGE UP (ref 96–108)
CHLORIDE SERPL-SCNC: 99 MMOL/L — SIGNIFICANT CHANGE UP (ref 96–108)
CO2 SERPL-SCNC: 23 MMOL/L — SIGNIFICANT CHANGE UP (ref 22–31)
CO2 SERPL-SCNC: 23 MMOL/L — SIGNIFICANT CHANGE UP (ref 22–31)
CREAT SERPL-MCNC: 0.75 MG/DL — SIGNIFICANT CHANGE UP (ref 0.5–1.3)
CREAT SERPL-MCNC: 0.75 MG/DL — SIGNIFICANT CHANGE UP (ref 0.5–1.3)
EGFR: 100 ML/MIN/1.73M2 — SIGNIFICANT CHANGE UP
EGFR: 100 ML/MIN/1.73M2 — SIGNIFICANT CHANGE UP
GLUCOSE SERPL-MCNC: 265 MG/DL — HIGH (ref 70–99)
GLUCOSE SERPL-MCNC: 265 MG/DL — HIGH (ref 70–99)
MAGNESIUM SERPL-MCNC: 1.8 MG/DL — SIGNIFICANT CHANGE UP (ref 1.6–2.6)
MAGNESIUM SERPL-MCNC: 1.8 MG/DL — SIGNIFICANT CHANGE UP (ref 1.6–2.6)
POTASSIUM SERPL-MCNC: 4.6 MMOL/L — SIGNIFICANT CHANGE UP (ref 3.5–5.3)
POTASSIUM SERPL-MCNC: 4.6 MMOL/L — SIGNIFICANT CHANGE UP (ref 3.5–5.3)
POTASSIUM SERPL-SCNC: 4.6 MMOL/L — SIGNIFICANT CHANGE UP (ref 3.5–5.3)
POTASSIUM SERPL-SCNC: 4.6 MMOL/L — SIGNIFICANT CHANGE UP (ref 3.5–5.3)
PROT SERPL-MCNC: 7.1 G/DL — SIGNIFICANT CHANGE UP (ref 6–8.3)
PROT SERPL-MCNC: 7.1 G/DL — SIGNIFICANT CHANGE UP (ref 6–8.3)
SODIUM SERPL-SCNC: 136 MMOL/L — SIGNIFICANT CHANGE UP (ref 135–145)
SODIUM SERPL-SCNC: 136 MMOL/L — SIGNIFICANT CHANGE UP (ref 135–145)

## 2023-10-18 LAB
ALBUMIN SERPL ELPH-MCNC: 3.5 G/DL
ALP BLD-CCNC: 169 U/L
ALT SERPL-CCNC: 23 U/L
ANION GAP SERPL CALC-SCNC: 11 MMOL/L
AST SERPL-CCNC: 22 U/L
BILIRUB SERPL-MCNC: <0.2 MG/DL
BUN SERPL-MCNC: 7 MG/DL
CALCIUM SERPL-MCNC: 9.2 MG/DL
CHLORIDE SERPL-SCNC: 99 MMOL/L
CO2 SERPL-SCNC: 28 MMOL/L
CREAT SERPL-MCNC: 0.73 MG/DL
EGFR: 101 ML/MIN/1.73M2
GLUCOSE SERPL-MCNC: 182 MG/DL
MAGNESIUM SERPL-MCNC: 1.8 MG/DL
POTASSIUM SERPL-SCNC: 4.7 MMOL/L
PROT SERPL-MCNC: 6.6 G/DL
SODIUM SERPL-SCNC: 138 MMOL/L

## 2023-10-19 ENCOUNTER — NON-APPOINTMENT (OUTPATIENT)
Age: 68
End: 2023-10-19

## 2023-10-23 ENCOUNTER — RESULT REVIEW (OUTPATIENT)
Age: 68
End: 2023-10-23

## 2023-10-23 ENCOUNTER — APPOINTMENT (OUTPATIENT)
Age: 68
End: 2023-10-23

## 2023-10-23 LAB
BASOPHILS # BLD AUTO: 0.1 K/UL — SIGNIFICANT CHANGE UP (ref 0–0.2)
BASOPHILS # BLD AUTO: 0.1 K/UL — SIGNIFICANT CHANGE UP (ref 0–0.2)
BASOPHILS NFR BLD AUTO: 2.4 % — HIGH (ref 0–2)
BASOPHILS NFR BLD AUTO: 2.4 % — HIGH (ref 0–2)
EOSINOPHIL # BLD AUTO: 0.1 K/UL — SIGNIFICANT CHANGE UP (ref 0–0.5)
EOSINOPHIL # BLD AUTO: 0.1 K/UL — SIGNIFICANT CHANGE UP (ref 0–0.5)
EOSINOPHIL NFR BLD AUTO: 1.3 % — SIGNIFICANT CHANGE UP (ref 0–6)
EOSINOPHIL NFR BLD AUTO: 1.3 % — SIGNIFICANT CHANGE UP (ref 0–6)
HCT VFR BLD CALC: 35.1 % — LOW (ref 39–50)
HCT VFR BLD CALC: 35.1 % — LOW (ref 39–50)
HGB BLD-MCNC: 11.5 G/DL — LOW (ref 13–17)
HGB BLD-MCNC: 11.5 G/DL — LOW (ref 13–17)
LYMPHOCYTES # BLD AUTO: 1.2 K/UL — SIGNIFICANT CHANGE UP (ref 1–3.3)
LYMPHOCYTES # BLD AUTO: 1.2 K/UL — SIGNIFICANT CHANGE UP (ref 1–3.3)
LYMPHOCYTES # BLD AUTO: 25 % — SIGNIFICANT CHANGE UP (ref 13–44)
LYMPHOCYTES # BLD AUTO: 25 % — SIGNIFICANT CHANGE UP (ref 13–44)
MCHC RBC-ENTMCNC: 28 PG — SIGNIFICANT CHANGE UP (ref 27–34)
MCHC RBC-ENTMCNC: 28 PG — SIGNIFICANT CHANGE UP (ref 27–34)
MCHC RBC-ENTMCNC: 32.7 G/DL — SIGNIFICANT CHANGE UP (ref 32–36)
MCHC RBC-ENTMCNC: 32.7 G/DL — SIGNIFICANT CHANGE UP (ref 32–36)
MCV RBC AUTO: 85.6 FL — SIGNIFICANT CHANGE UP (ref 80–100)
MCV RBC AUTO: 85.6 FL — SIGNIFICANT CHANGE UP (ref 80–100)
MONOCYTES # BLD AUTO: 0.8 K/UL — SIGNIFICANT CHANGE UP (ref 0–0.9)
MONOCYTES # BLD AUTO: 0.8 K/UL — SIGNIFICANT CHANGE UP (ref 0–0.9)
MONOCYTES NFR BLD AUTO: 16.2 % — HIGH (ref 2–14)
MONOCYTES NFR BLD AUTO: 16.2 % — HIGH (ref 2–14)
NEUTROPHILS # BLD AUTO: 2.7 K/UL — SIGNIFICANT CHANGE UP (ref 1.8–7.4)
NEUTROPHILS # BLD AUTO: 2.7 K/UL — SIGNIFICANT CHANGE UP (ref 1.8–7.4)
NEUTROPHILS NFR BLD AUTO: 55 % — SIGNIFICANT CHANGE UP (ref 43–77)
NEUTROPHILS NFR BLD AUTO: 55 % — SIGNIFICANT CHANGE UP (ref 43–77)
PLATELET # BLD AUTO: 288 K/UL — SIGNIFICANT CHANGE UP (ref 150–400)
PLATELET # BLD AUTO: 288 K/UL — SIGNIFICANT CHANGE UP (ref 150–400)
RBC # BLD: 4.1 M/UL — LOW (ref 4.2–5.8)
RBC # BLD: 4.1 M/UL — LOW (ref 4.2–5.8)
RBC # FLD: 13.1 % — SIGNIFICANT CHANGE UP (ref 10.3–14.5)
RBC # FLD: 13.1 % — SIGNIFICANT CHANGE UP (ref 10.3–14.5)
WBC # BLD: 5 K/UL — SIGNIFICANT CHANGE UP (ref 3.8–10.5)
WBC # BLD: 5 K/UL — SIGNIFICANT CHANGE UP (ref 3.8–10.5)
WBC # FLD AUTO: 5 K/UL — SIGNIFICANT CHANGE UP (ref 3.8–10.5)
WBC # FLD AUTO: 5 K/UL — SIGNIFICANT CHANGE UP (ref 3.8–10.5)

## 2023-10-23 RX ORDER — LIDOCAINE AND PRILOCAINE 25; 25 MG/G; MG/G
2.5-2.5 CREAM TOPICAL
Qty: 1 | Refills: 5 | Status: ACTIVE | COMMUNITY
Start: 2023-10-23 | End: 1900-01-01

## 2023-10-24 ENCOUNTER — APPOINTMENT (OUTPATIENT)
Dept: UROLOGY | Facility: CLINIC | Age: 68
End: 2023-10-24
Payer: MEDICARE

## 2023-10-24 VITALS — DIASTOLIC BLOOD PRESSURE: 92 MMHG | HEART RATE: 89 BPM | SYSTOLIC BLOOD PRESSURE: 152 MMHG

## 2023-10-24 DIAGNOSIS — D49.59 NEOPLASM UNSPECIFIED BEHAVIOR OF OTHER GENITOURINARY ORGAN: ICD-10-CM

## 2023-10-24 DIAGNOSIS — N13.5 CROSSING VESSEL AND STRICTURE OF URETER W/OUT HYDRONEPHROSIS: ICD-10-CM

## 2023-10-24 LAB
ALBUMIN SERPL ELPH-MCNC: 3.5 G/DL — SIGNIFICANT CHANGE UP (ref 3.3–5)
ALBUMIN SERPL ELPH-MCNC: 3.5 G/DL — SIGNIFICANT CHANGE UP (ref 3.3–5)
ALP SERPL-CCNC: 165 U/L — HIGH (ref 40–120)
ALP SERPL-CCNC: 165 U/L — HIGH (ref 40–120)
ALT FLD-CCNC: 28 U/L — SIGNIFICANT CHANGE UP (ref 10–45)
ALT FLD-CCNC: 28 U/L — SIGNIFICANT CHANGE UP (ref 10–45)
ANION GAP SERPL CALC-SCNC: 10 MMOL/L — SIGNIFICANT CHANGE UP (ref 5–17)
ANION GAP SERPL CALC-SCNC: 10 MMOL/L — SIGNIFICANT CHANGE UP (ref 5–17)
AST SERPL-CCNC: 24 U/L — SIGNIFICANT CHANGE UP (ref 10–40)
AST SERPL-CCNC: 24 U/L — SIGNIFICANT CHANGE UP (ref 10–40)
BILIRUB SERPL-MCNC: 0.2 MG/DL — SIGNIFICANT CHANGE UP (ref 0.2–1.2)
BILIRUB SERPL-MCNC: 0.2 MG/DL — SIGNIFICANT CHANGE UP (ref 0.2–1.2)
BUN SERPL-MCNC: 9 MG/DL — SIGNIFICANT CHANGE UP (ref 7–23)
BUN SERPL-MCNC: 9 MG/DL — SIGNIFICANT CHANGE UP (ref 7–23)
CALCIUM SERPL-MCNC: 9.2 MG/DL — SIGNIFICANT CHANGE UP (ref 8.4–10.5)
CALCIUM SERPL-MCNC: 9.2 MG/DL — SIGNIFICANT CHANGE UP (ref 8.4–10.5)
CHLORIDE SERPL-SCNC: 99 MMOL/L — SIGNIFICANT CHANGE UP (ref 96–108)
CHLORIDE SERPL-SCNC: 99 MMOL/L — SIGNIFICANT CHANGE UP (ref 96–108)
CO2 SERPL-SCNC: 27 MMOL/L — SIGNIFICANT CHANGE UP (ref 22–31)
CO2 SERPL-SCNC: 27 MMOL/L — SIGNIFICANT CHANGE UP (ref 22–31)
CREAT SERPL-MCNC: 0.69 MG/DL — SIGNIFICANT CHANGE UP (ref 0.5–1.3)
CREAT SERPL-MCNC: 0.69 MG/DL — SIGNIFICANT CHANGE UP (ref 0.5–1.3)
EGFR: 103 ML/MIN/1.73M2 — SIGNIFICANT CHANGE UP
EGFR: 103 ML/MIN/1.73M2 — SIGNIFICANT CHANGE UP
GLUCOSE SERPL-MCNC: 251 MG/DL — HIGH (ref 70–99)
GLUCOSE SERPL-MCNC: 251 MG/DL — HIGH (ref 70–99)
MAGNESIUM SERPL-MCNC: 1.7 MG/DL — SIGNIFICANT CHANGE UP (ref 1.6–2.6)
MAGNESIUM SERPL-MCNC: 1.7 MG/DL — SIGNIFICANT CHANGE UP (ref 1.6–2.6)
POTASSIUM SERPL-MCNC: 4.3 MMOL/L — SIGNIFICANT CHANGE UP (ref 3.5–5.3)
POTASSIUM SERPL-MCNC: 4.3 MMOL/L — SIGNIFICANT CHANGE UP (ref 3.5–5.3)
POTASSIUM SERPL-SCNC: 4.3 MMOL/L — SIGNIFICANT CHANGE UP (ref 3.5–5.3)
POTASSIUM SERPL-SCNC: 4.3 MMOL/L — SIGNIFICANT CHANGE UP (ref 3.5–5.3)
PROT SERPL-MCNC: 6.9 G/DL — SIGNIFICANT CHANGE UP (ref 6–8.3)
PROT SERPL-MCNC: 6.9 G/DL — SIGNIFICANT CHANGE UP (ref 6–8.3)
SODIUM SERPL-SCNC: 136 MMOL/L — SIGNIFICANT CHANGE UP (ref 135–145)
SODIUM SERPL-SCNC: 136 MMOL/L — SIGNIFICANT CHANGE UP (ref 135–145)

## 2023-10-24 PROCEDURE — 99213 OFFICE O/P EST LOW 20 MIN: CPT

## 2023-10-24 RX ORDER — FUROSEMIDE 20 MG/1
20 TABLET ORAL
Qty: 90 | Refills: 1 | Status: DISCONTINUED | COMMUNITY
Start: 2023-10-05 | End: 2023-10-24

## 2023-11-03 ENCOUNTER — RESULT REVIEW (OUTPATIENT)
Age: 68
End: 2023-11-03

## 2023-11-03 ENCOUNTER — APPOINTMENT (OUTPATIENT)
Dept: HEMATOLOGY ONCOLOGY | Facility: CLINIC | Age: 68
End: 2023-11-03
Payer: MEDICARE

## 2023-11-03 ENCOUNTER — OUTPATIENT (OUTPATIENT)
Dept: OUTPATIENT SERVICES | Facility: HOSPITAL | Age: 68
LOS: 1 days | Discharge: HOME | End: 2023-11-03

## 2023-11-03 VITALS
BODY MASS INDEX: 23.14 KG/M2 | HEART RATE: 86 BPM | WEIGHT: 144 LBS | OXYGEN SATURATION: 99 % | DIASTOLIC BLOOD PRESSURE: 90 MMHG | TEMPERATURE: 97.8 F | HEIGHT: 66 IN | SYSTOLIC BLOOD PRESSURE: 136 MMHG

## 2023-11-03 DIAGNOSIS — C48.1 MALIGNANT NEOPLASM OF SPECIFIED PARTS OF PERITONEUM: ICD-10-CM

## 2023-11-03 DIAGNOSIS — Z98.890 OTHER SPECIFIED POSTPROCEDURAL STATES: Chronic | ICD-10-CM

## 2023-11-03 DIAGNOSIS — Z90.49 ACQUIRED ABSENCE OF OTHER SPECIFIED PARTS OF DIGESTIVE TRACT: Chronic | ICD-10-CM

## 2023-11-03 LAB
BASOPHILS # BLD AUTO: 0.1 K/UL — SIGNIFICANT CHANGE UP (ref 0–0.2)
BASOPHILS # BLD AUTO: 0.1 K/UL — SIGNIFICANT CHANGE UP (ref 0–0.2)
BASOPHILS NFR BLD AUTO: 1.5 % — SIGNIFICANT CHANGE UP (ref 0–2)
BASOPHILS NFR BLD AUTO: 1.5 % — SIGNIFICANT CHANGE UP (ref 0–2)
EOSINOPHIL # BLD AUTO: 0.1 K/UL — SIGNIFICANT CHANGE UP (ref 0–0.5)
EOSINOPHIL # BLD AUTO: 0.1 K/UL — SIGNIFICANT CHANGE UP (ref 0–0.5)
EOSINOPHIL NFR BLD AUTO: 1.2 % — SIGNIFICANT CHANGE UP (ref 0–6)
EOSINOPHIL NFR BLD AUTO: 1.2 % — SIGNIFICANT CHANGE UP (ref 0–6)
HCT VFR BLD CALC: 34.8 % — LOW (ref 39–50)
HCT VFR BLD CALC: 34.8 % — LOW (ref 39–50)
HGB BLD-MCNC: 11.4 G/DL — LOW (ref 13–17)
HGB BLD-MCNC: 11.4 G/DL — LOW (ref 13–17)
LYMPHOCYTES # BLD AUTO: 1 K/UL — SIGNIFICANT CHANGE UP (ref 1–3.3)
LYMPHOCYTES # BLD AUTO: 1 K/UL — SIGNIFICANT CHANGE UP (ref 1–3.3)
LYMPHOCYTES # BLD AUTO: 23.8 % — SIGNIFICANT CHANGE UP (ref 13–44)
LYMPHOCYTES # BLD AUTO: 23.8 % — SIGNIFICANT CHANGE UP (ref 13–44)
MCHC RBC-ENTMCNC: 29.1 PG — SIGNIFICANT CHANGE UP (ref 27–34)
MCHC RBC-ENTMCNC: 29.1 PG — SIGNIFICANT CHANGE UP (ref 27–34)
MCHC RBC-ENTMCNC: 32.6 G/DL — SIGNIFICANT CHANGE UP (ref 32–36)
MCHC RBC-ENTMCNC: 32.6 G/DL — SIGNIFICANT CHANGE UP (ref 32–36)
MCV RBC AUTO: 89.3 FL — SIGNIFICANT CHANGE UP (ref 80–100)
MCV RBC AUTO: 89.3 FL — SIGNIFICANT CHANGE UP (ref 80–100)
MONOCYTES # BLD AUTO: 0.4 K/UL — SIGNIFICANT CHANGE UP (ref 0–0.9)
MONOCYTES # BLD AUTO: 0.4 K/UL — SIGNIFICANT CHANGE UP (ref 0–0.9)
MONOCYTES NFR BLD AUTO: 9 % — SIGNIFICANT CHANGE UP (ref 2–14)
MONOCYTES NFR BLD AUTO: 9 % — SIGNIFICANT CHANGE UP (ref 2–14)
NEUTROPHILS # BLD AUTO: 2.8 K/UL — SIGNIFICANT CHANGE UP (ref 1.8–7.4)
NEUTROPHILS # BLD AUTO: 2.8 K/UL — SIGNIFICANT CHANGE UP (ref 1.8–7.4)
NEUTROPHILS NFR BLD AUTO: 64.5 % — SIGNIFICANT CHANGE UP (ref 43–77)
NEUTROPHILS NFR BLD AUTO: 64.5 % — SIGNIFICANT CHANGE UP (ref 43–77)
PLATELET # BLD AUTO: 253 K/UL — SIGNIFICANT CHANGE UP (ref 150–400)
PLATELET # BLD AUTO: 253 K/UL — SIGNIFICANT CHANGE UP (ref 150–400)
RBC # BLD: 3.9 M/UL — LOW (ref 4.2–5.8)
RBC # BLD: 3.9 M/UL — LOW (ref 4.2–5.8)
RBC # FLD: 15.1 % — HIGH (ref 10.3–14.5)
RBC # FLD: 15.1 % — HIGH (ref 10.3–14.5)
WBC # BLD: 4.4 K/UL — SIGNIFICANT CHANGE UP (ref 3.8–10.5)
WBC # BLD: 4.4 K/UL — SIGNIFICANT CHANGE UP (ref 3.8–10.5)
WBC # FLD AUTO: 4.4 K/UL — SIGNIFICANT CHANGE UP (ref 3.8–10.5)
WBC # FLD AUTO: 4.4 K/UL — SIGNIFICANT CHANGE UP (ref 3.8–10.5)

## 2023-11-03 PROCEDURE — 99214 OFFICE O/P EST MOD 30 MIN: CPT

## 2023-11-06 ENCOUNTER — APPOINTMENT (OUTPATIENT)
Age: 68
End: 2023-11-06

## 2023-11-06 ENCOUNTER — NON-APPOINTMENT (OUTPATIENT)
Age: 68
End: 2023-11-06

## 2023-11-07 DIAGNOSIS — C78.6 SECONDARY MALIGNANT NEOPLASM OF RETROPERITONEUM AND PERITONEUM: ICD-10-CM

## 2023-11-07 DIAGNOSIS — C68.9 MALIGNANT NEOPLASM OF URINARY ORGAN, UNSPECIFIED: ICD-10-CM

## 2023-11-07 DIAGNOSIS — Z51.11 ENCOUNTER FOR ANTINEOPLASTIC CHEMOTHERAPY: ICD-10-CM

## 2023-11-07 DIAGNOSIS — R11.2 NAUSEA WITH VOMITING, UNSPECIFIED: ICD-10-CM

## 2023-11-07 DIAGNOSIS — R18.0 MALIGNANT ASCITES: ICD-10-CM

## 2023-11-10 ENCOUNTER — APPOINTMENT (OUTPATIENT)
Dept: HEMATOLOGY ONCOLOGY | Facility: CLINIC | Age: 68
End: 2023-11-10

## 2023-11-13 ENCOUNTER — RESULT REVIEW (OUTPATIENT)
Age: 68
End: 2023-11-13

## 2023-11-13 ENCOUNTER — APPOINTMENT (OUTPATIENT)
Age: 68
End: 2023-11-13

## 2023-11-13 LAB
BASOPHILS # BLD AUTO: 0.1 K/UL — SIGNIFICANT CHANGE UP (ref 0–0.2)
BASOPHILS # BLD AUTO: 0.1 K/UL — SIGNIFICANT CHANGE UP (ref 0–0.2)
BASOPHILS NFR BLD AUTO: 1.2 % — SIGNIFICANT CHANGE UP (ref 0–2)
BASOPHILS NFR BLD AUTO: 1.2 % — SIGNIFICANT CHANGE UP (ref 0–2)
BUN SERPL-MCNC: 12 MG/DL — SIGNIFICANT CHANGE UP (ref 7–23)
BUN SERPL-MCNC: 12 MG/DL — SIGNIFICANT CHANGE UP (ref 7–23)
CA-I BLDA-SCNC: 1.19 MMOL/L — SIGNIFICANT CHANGE UP (ref 1.12–1.3)
CA-I BLDA-SCNC: 1.19 MMOL/L — SIGNIFICANT CHANGE UP (ref 1.12–1.3)
CHLORIDE SERPL-SCNC: 97 MMOL/L — SIGNIFICANT CHANGE UP (ref 96–108)
CHLORIDE SERPL-SCNC: 97 MMOL/L — SIGNIFICANT CHANGE UP (ref 96–108)
CO2 SERPL-SCNC: 26 MMOL/L — SIGNIFICANT CHANGE UP (ref 22–31)
CO2 SERPL-SCNC: 26 MMOL/L — SIGNIFICANT CHANGE UP (ref 22–31)
CREAT SERPL-MCNC: 0.7 MG/DL — SIGNIFICANT CHANGE UP (ref 0.5–1.3)
CREAT SERPL-MCNC: 0.7 MG/DL — SIGNIFICANT CHANGE UP (ref 0.5–1.3)
EOSINOPHIL # BLD AUTO: 0 K/UL — SIGNIFICANT CHANGE UP (ref 0–0.5)
EOSINOPHIL # BLD AUTO: 0 K/UL — SIGNIFICANT CHANGE UP (ref 0–0.5)
EOSINOPHIL NFR BLD AUTO: 1.1 % — SIGNIFICANT CHANGE UP (ref 0–6)
EOSINOPHIL NFR BLD AUTO: 1.1 % — SIGNIFICANT CHANGE UP (ref 0–6)
GLUCOSE SERPL-MCNC: 255 MG/DL — HIGH (ref 70–99)
GLUCOSE SERPL-MCNC: 255 MG/DL — HIGH (ref 70–99)
HCT VFR BLD CALC: 34.8 % — LOW (ref 39–50)
HCT VFR BLD CALC: 34.8 % — LOW (ref 39–50)
HGB BLD-MCNC: 11.6 G/DL — LOW (ref 13–17)
HGB BLD-MCNC: 11.6 G/DL — LOW (ref 13–17)
LYMPHOCYTES # BLD AUTO: 0.9 K/UL — LOW (ref 1–3.3)
LYMPHOCYTES # BLD AUTO: 0.9 K/UL — LOW (ref 1–3.3)
LYMPHOCYTES # BLD AUTO: 21.2 % — SIGNIFICANT CHANGE UP (ref 13–44)
LYMPHOCYTES # BLD AUTO: 21.2 % — SIGNIFICANT CHANGE UP (ref 13–44)
MCHC RBC-ENTMCNC: 28.6 PG — SIGNIFICANT CHANGE UP (ref 27–34)
MCHC RBC-ENTMCNC: 28.6 PG — SIGNIFICANT CHANGE UP (ref 27–34)
MCHC RBC-ENTMCNC: 33.3 G/DL — SIGNIFICANT CHANGE UP (ref 32–36)
MCHC RBC-ENTMCNC: 33.3 G/DL — SIGNIFICANT CHANGE UP (ref 32–36)
MCV RBC AUTO: 86.1 FL — SIGNIFICANT CHANGE UP (ref 80–100)
MCV RBC AUTO: 86.1 FL — SIGNIFICANT CHANGE UP (ref 80–100)
MONOCYTES # BLD AUTO: 0.4 K/UL — SIGNIFICANT CHANGE UP (ref 0–0.9)
MONOCYTES # BLD AUTO: 0.4 K/UL — SIGNIFICANT CHANGE UP (ref 0–0.9)
MONOCYTES NFR BLD AUTO: 8.8 % — SIGNIFICANT CHANGE UP (ref 2–14)
MONOCYTES NFR BLD AUTO: 8.8 % — SIGNIFICANT CHANGE UP (ref 2–14)
NEUTROPHILS # BLD AUTO: 2.8 K/UL — SIGNIFICANT CHANGE UP (ref 1.8–7.4)
NEUTROPHILS # BLD AUTO: 2.8 K/UL — SIGNIFICANT CHANGE UP (ref 1.8–7.4)
NEUTROPHILS NFR BLD AUTO: 67.7 % — SIGNIFICANT CHANGE UP (ref 43–77)
NEUTROPHILS NFR BLD AUTO: 67.7 % — SIGNIFICANT CHANGE UP (ref 43–77)
PLATELET # BLD AUTO: 269 K/UL — SIGNIFICANT CHANGE UP (ref 150–400)
PLATELET # BLD AUTO: 269 K/UL — SIGNIFICANT CHANGE UP (ref 150–400)
POTASSIUM SERPL-MCNC: 4.1 MMOL/L — SIGNIFICANT CHANGE UP (ref 3.5–5.3)
POTASSIUM SERPL-MCNC: 4.1 MMOL/L — SIGNIFICANT CHANGE UP (ref 3.5–5.3)
POTASSIUM SERPL-SCNC: 4.1 MMOL/L — SIGNIFICANT CHANGE UP (ref 3.5–5.3)
POTASSIUM SERPL-SCNC: 4.1 MMOL/L — SIGNIFICANT CHANGE UP (ref 3.5–5.3)
RBC # BLD: 4.04 M/UL — LOW (ref 4.2–5.8)
RBC # BLD: 4.04 M/UL — LOW (ref 4.2–5.8)
RBC # FLD: 15.5 % — HIGH (ref 10.3–14.5)
RBC # FLD: 15.5 % — HIGH (ref 10.3–14.5)
SODIUM SERPL-SCNC: 136 MMOL/L — SIGNIFICANT CHANGE UP (ref 135–145)
SODIUM SERPL-SCNC: 136 MMOL/L — SIGNIFICANT CHANGE UP (ref 135–145)
WBC # BLD: 4.1 K/UL — SIGNIFICANT CHANGE UP (ref 3.8–10.5)
WBC # BLD: 4.1 K/UL — SIGNIFICANT CHANGE UP (ref 3.8–10.5)
WBC # FLD AUTO: 4.1 K/UL — SIGNIFICANT CHANGE UP (ref 3.8–10.5)
WBC # FLD AUTO: 4.1 K/UL — SIGNIFICANT CHANGE UP (ref 3.8–10.5)

## 2023-11-13 PROCEDURE — 88341 IMHCHEM/IMCYTCHM EA ADD ANTB: CPT

## 2023-11-13 PROCEDURE — 93005 ELECTROCARDIOGRAM TRACING: CPT

## 2023-11-13 PROCEDURE — 36014 PLACE CATHETER IN ARTERY: CPT | Mod: XS

## 2023-11-13 PROCEDURE — 0225U NFCT DS DNA&RNA 21 SARSCOV2: CPT

## 2023-11-13 PROCEDURE — 70450 CT HEAD/BRAIN W/O DYE: CPT | Mod: MA

## 2023-11-13 PROCEDURE — 80053 COMPREHEN METABOLIC PANEL: CPT

## 2023-11-13 PROCEDURE — 93970 EXTREMITY STUDY: CPT

## 2023-11-13 PROCEDURE — 96374 THER/PROPH/DIAG INJ IV PUSH: CPT

## 2023-11-13 PROCEDURE — G0463: CPT

## 2023-11-13 PROCEDURE — 85025 COMPLETE CBC W/AUTO DIFF WBC: CPT

## 2023-11-13 PROCEDURE — C1894: CPT

## 2023-11-13 PROCEDURE — C9399: CPT

## 2023-11-13 PROCEDURE — 36015 PLACE CATHETER IN ARTERY: CPT

## 2023-11-13 PROCEDURE — 80048 BASIC METABOLIC PNL TOTAL CA: CPT

## 2023-11-13 PROCEDURE — 83735 ASSAY OF MAGNESIUM: CPT

## 2023-11-13 PROCEDURE — 85730 THROMBOPLASTIN TIME PARTIAL: CPT

## 2023-11-13 PROCEDURE — 86803 HEPATITIS C AB TEST: CPT

## 2023-11-13 PROCEDURE — 76705 ECHO EXAM OF ABDOMEN: CPT

## 2023-11-13 PROCEDURE — 37185 PRIM ART M-THRMBC SBSQ VSL: CPT

## 2023-11-13 PROCEDURE — C1889: CPT

## 2023-11-13 PROCEDURE — 84100 ASSAY OF PHOSPHORUS: CPT

## 2023-11-13 PROCEDURE — 37184 PRIM ART M-THRMBC 1ST VSL: CPT | Mod: 50

## 2023-11-13 PROCEDURE — C1769: CPT

## 2023-11-13 PROCEDURE — 75743 ARTERY X-RAYS LUNGS: CPT | Mod: XU

## 2023-11-13 PROCEDURE — 93308 TTE F-UP OR LMTD: CPT

## 2023-11-13 PROCEDURE — 76000 FLUOROSCOPY <1 HR PHYS/QHP: CPT

## 2023-11-13 PROCEDURE — 71275 CT ANGIOGRAPHY CHEST: CPT | Mod: MA

## 2023-11-13 PROCEDURE — 82962 GLUCOSE BLOOD TEST: CPT

## 2023-11-13 PROCEDURE — 83880 ASSAY OF NATRIURETIC PEPTIDE: CPT

## 2023-11-13 PROCEDURE — 85610 PROTHROMBIN TIME: CPT

## 2023-11-13 PROCEDURE — 74177 CT ABD & PELVIS W/CONTRAST: CPT | Mod: MA

## 2023-11-13 PROCEDURE — 99291 CRITICAL CARE FIRST HOUR: CPT | Mod: 25

## 2023-11-13 PROCEDURE — 36415 COLL VENOUS BLD VENIPUNCTURE: CPT

## 2023-11-13 PROCEDURE — C2617: CPT

## 2023-11-13 PROCEDURE — C1887: CPT

## 2023-11-13 PROCEDURE — C1757: CPT

## 2023-11-13 PROCEDURE — 84484 ASSAY OF TROPONIN QUANT: CPT

## 2023-11-13 PROCEDURE — 85027 COMPLETE CBC AUTOMATED: CPT

## 2023-11-13 PROCEDURE — C8929: CPT

## 2023-11-13 PROCEDURE — 88305 TISSUE EXAM BY PATHOLOGIST: CPT

## 2023-11-14 ENCOUNTER — APPOINTMENT (OUTPATIENT)
Dept: INTERVENTIONAL RADIOLOGY/VASCULAR | Facility: CLINIC | Age: 68
End: 2023-11-14
Payer: MEDICARE

## 2023-11-14 ENCOUNTER — RESULT REVIEW (OUTPATIENT)
Age: 68
End: 2023-11-14

## 2023-11-14 ENCOUNTER — OUTPATIENT (OUTPATIENT)
Dept: OUTPATIENT SERVICES | Facility: HOSPITAL | Age: 68
LOS: 1 days | End: 2023-11-14

## 2023-11-14 ENCOUNTER — NON-APPOINTMENT (OUTPATIENT)
Age: 68
End: 2023-11-14

## 2023-11-14 DIAGNOSIS — Z98.890 OTHER SPECIFIED POSTPROCEDURAL STATES: Chronic | ICD-10-CM

## 2023-11-14 DIAGNOSIS — Z90.49 ACQUIRED ABSENCE OF OTHER SPECIFIED PARTS OF DIGESTIVE TRACT: Chronic | ICD-10-CM

## 2023-11-14 DIAGNOSIS — N13.5 CROSSING VESSEL AND STRICTURE OF URETER WITHOUT HYDRONEPHROSIS: ICD-10-CM

## 2023-11-14 PROCEDURE — 50431 NJX PX NFROSGRM &/URTRGRM: CPT | Mod: LT

## 2023-11-20 DIAGNOSIS — E86.0 DEHYDRATION: ICD-10-CM

## 2023-11-21 ENCOUNTER — RESULT REVIEW (OUTPATIENT)
Age: 68
End: 2023-11-21

## 2023-11-21 ENCOUNTER — OUTPATIENT (OUTPATIENT)
Dept: OUTPATIENT SERVICES | Facility: HOSPITAL | Age: 68
LOS: 1 days | End: 2023-11-21

## 2023-11-21 ENCOUNTER — APPOINTMENT (OUTPATIENT)
Dept: INTERVENTIONAL RADIOLOGY/VASCULAR | Facility: CLINIC | Age: 68
End: 2023-11-21
Payer: MEDICARE

## 2023-11-21 VITALS
HEART RATE: 76 BPM | OXYGEN SATURATION: 99 % | DIASTOLIC BLOOD PRESSURE: 97 MMHG | SYSTOLIC BLOOD PRESSURE: 158 MMHG | TEMPERATURE: 98 F | RESPIRATION RATE: 16 BRPM

## 2023-11-21 DIAGNOSIS — Z98.890 OTHER SPECIFIED POSTPROCEDURAL STATES: Chronic | ICD-10-CM

## 2023-11-21 DIAGNOSIS — Z90.49 ACQUIRED ABSENCE OF OTHER SPECIFIED PARTS OF DIGESTIVE TRACT: Chronic | ICD-10-CM

## 2023-11-21 DIAGNOSIS — Z00.8 ENCOUNTER FOR OTHER GENERAL EXAMINATION: ICD-10-CM

## 2023-11-21 PROCEDURE — 50389 REMOVE RENAL TUBE W/FLUORO: CPT | Mod: LT

## 2023-11-22 ENCOUNTER — APPOINTMENT (OUTPATIENT)
Dept: HEMATOLOGY ONCOLOGY | Facility: CLINIC | Age: 68
End: 2023-11-22

## 2023-11-24 ENCOUNTER — APPOINTMENT (OUTPATIENT)
Dept: HEMATOLOGY ONCOLOGY | Facility: CLINIC | Age: 68
End: 2023-11-24

## 2023-11-27 ENCOUNTER — RESULT REVIEW (OUTPATIENT)
Age: 68
End: 2023-11-27

## 2023-11-27 ENCOUNTER — APPOINTMENT (OUTPATIENT)
Age: 68
End: 2023-11-27

## 2023-11-27 LAB
BASOPHILS # BLD AUTO: 0.1 K/UL — SIGNIFICANT CHANGE UP (ref 0–0.2)
BASOPHILS # BLD AUTO: 0.1 K/UL — SIGNIFICANT CHANGE UP (ref 0–0.2)
BASOPHILS NFR BLD AUTO: 1.5 % — SIGNIFICANT CHANGE UP (ref 0–2)
BASOPHILS NFR BLD AUTO: 1.5 % — SIGNIFICANT CHANGE UP (ref 0–2)
BUN SERPL-MCNC: 11 MG/DL — SIGNIFICANT CHANGE UP (ref 7–23)
BUN SERPL-MCNC: 11 MG/DL — SIGNIFICANT CHANGE UP (ref 7–23)
CA-I BLDA-SCNC: 1.14 MMOL/L — SIGNIFICANT CHANGE UP (ref 1.12–1.3)
CA-I BLDA-SCNC: 1.14 MMOL/L — SIGNIFICANT CHANGE UP (ref 1.12–1.3)
CHLORIDE SERPL-SCNC: 97 MMOL/L — SIGNIFICANT CHANGE UP (ref 96–108)
CHLORIDE SERPL-SCNC: 97 MMOL/L — SIGNIFICANT CHANGE UP (ref 96–108)
CO2 SERPL-SCNC: 26 MMOL/L — SIGNIFICANT CHANGE UP (ref 22–31)
CO2 SERPL-SCNC: 26 MMOL/L — SIGNIFICANT CHANGE UP (ref 22–31)
CREAT SERPL-MCNC: 0.7 MG/DL — SIGNIFICANT CHANGE UP (ref 0.5–1.3)
CREAT SERPL-MCNC: 0.7 MG/DL — SIGNIFICANT CHANGE UP (ref 0.5–1.3)
EOSINOPHIL # BLD AUTO: 0.1 K/UL — SIGNIFICANT CHANGE UP (ref 0–0.5)
EOSINOPHIL # BLD AUTO: 0.1 K/UL — SIGNIFICANT CHANGE UP (ref 0–0.5)
EOSINOPHIL NFR BLD AUTO: 2.6 % — SIGNIFICANT CHANGE UP (ref 0–6)
EOSINOPHIL NFR BLD AUTO: 2.6 % — SIGNIFICANT CHANGE UP (ref 0–6)
GLUCOSE SERPL-MCNC: 373 MG/DL — HIGH (ref 70–99)
GLUCOSE SERPL-MCNC: 373 MG/DL — HIGH (ref 70–99)
HCT VFR BLD CALC: 36.6 % — LOW (ref 39–50)
HCT VFR BLD CALC: 36.6 % — LOW (ref 39–50)
HGB BLD-MCNC: 11.8 G/DL — LOW (ref 13–17)
HGB BLD-MCNC: 11.8 G/DL — LOW (ref 13–17)
LYMPHOCYTES # BLD AUTO: 1 K/UL — SIGNIFICANT CHANGE UP (ref 1–3.3)
LYMPHOCYTES # BLD AUTO: 1 K/UL — SIGNIFICANT CHANGE UP (ref 1–3.3)
LYMPHOCYTES # BLD AUTO: 21.8 % — SIGNIFICANT CHANGE UP (ref 13–44)
LYMPHOCYTES # BLD AUTO: 21.8 % — SIGNIFICANT CHANGE UP (ref 13–44)
MCHC RBC-ENTMCNC: 29.6 PG — SIGNIFICANT CHANGE UP (ref 27–34)
MCHC RBC-ENTMCNC: 29.6 PG — SIGNIFICANT CHANGE UP (ref 27–34)
MCHC RBC-ENTMCNC: 32.4 G/DL — SIGNIFICANT CHANGE UP (ref 32–36)
MCHC RBC-ENTMCNC: 32.4 G/DL — SIGNIFICANT CHANGE UP (ref 32–36)
MCV RBC AUTO: 91.5 FL — SIGNIFICANT CHANGE UP (ref 80–100)
MCV RBC AUTO: 91.5 FL — SIGNIFICANT CHANGE UP (ref 80–100)
MONOCYTES # BLD AUTO: 0.5 K/UL — SIGNIFICANT CHANGE UP (ref 0–0.9)
MONOCYTES # BLD AUTO: 0.5 K/UL — SIGNIFICANT CHANGE UP (ref 0–0.9)
MONOCYTES NFR BLD AUTO: 11.8 % — SIGNIFICANT CHANGE UP (ref 2–14)
MONOCYTES NFR BLD AUTO: 11.8 % — SIGNIFICANT CHANGE UP (ref 2–14)
NEUTROPHILS # BLD AUTO: 2.7 K/UL — SIGNIFICANT CHANGE UP (ref 1.8–7.4)
NEUTROPHILS # BLD AUTO: 2.7 K/UL — SIGNIFICANT CHANGE UP (ref 1.8–7.4)
NEUTROPHILS NFR BLD AUTO: 62.2 % — SIGNIFICANT CHANGE UP (ref 43–77)
NEUTROPHILS NFR BLD AUTO: 62.2 % — SIGNIFICANT CHANGE UP (ref 43–77)
PLATELET # BLD AUTO: 392 K/UL — SIGNIFICANT CHANGE UP (ref 150–400)
PLATELET # BLD AUTO: 392 K/UL — SIGNIFICANT CHANGE UP (ref 150–400)
POTASSIUM SERPL-MCNC: 3.9 MMOL/L — SIGNIFICANT CHANGE UP (ref 3.5–5.3)
POTASSIUM SERPL-MCNC: 3.9 MMOL/L — SIGNIFICANT CHANGE UP (ref 3.5–5.3)
POTASSIUM SERPL-SCNC: 3.9 MMOL/L — SIGNIFICANT CHANGE UP (ref 3.5–5.3)
POTASSIUM SERPL-SCNC: 3.9 MMOL/L — SIGNIFICANT CHANGE UP (ref 3.5–5.3)
RBC # BLD: 4 M/UL — LOW (ref 4.2–5.8)
RBC # BLD: 4 M/UL — LOW (ref 4.2–5.8)
RBC # FLD: 17 % — HIGH (ref 10.3–14.5)
RBC # FLD: 17 % — HIGH (ref 10.3–14.5)
SODIUM SERPL-SCNC: 137 MMOL/L — SIGNIFICANT CHANGE UP (ref 135–145)
SODIUM SERPL-SCNC: 137 MMOL/L — SIGNIFICANT CHANGE UP (ref 135–145)
WBC # BLD: 4.4 K/UL — SIGNIFICANT CHANGE UP (ref 3.8–10.5)
WBC # BLD: 4.4 K/UL — SIGNIFICANT CHANGE UP (ref 3.8–10.5)
WBC # FLD AUTO: 4.4 K/UL — SIGNIFICANT CHANGE UP (ref 3.8–10.5)
WBC # FLD AUTO: 4.4 K/UL — SIGNIFICANT CHANGE UP (ref 3.8–10.5)

## 2023-12-01 LAB
ALBUMIN SERPL ELPH-MCNC: 3.9 G/DL
ALP BLD-CCNC: 124 U/L
ALP BLD-CCNC: 137 U/L
ALP BLD-CCNC: 140 U/L
ALT SERPL-CCNC: 14 U/L
ALT SERPL-CCNC: 19 U/L
ALT SERPL-CCNC: 28 U/L
ANION GAP SERPL CALC-SCNC: 13 MMOL/L
ANION GAP SERPL CALC-SCNC: 14 MMOL/L
ANION GAP SERPL CALC-SCNC: 14 MMOL/L
AST SERPL-CCNC: 15 U/L
AST SERPL-CCNC: 17 U/L
AST SERPL-CCNC: 17 U/L
BILIRUB SERPL-MCNC: 0.2 MG/DL
BILIRUB SERPL-MCNC: 0.3 MG/DL
BILIRUB SERPL-MCNC: 0.3 MG/DL
BUN SERPL-MCNC: 11 MG/DL
BUN SERPL-MCNC: 13 MG/DL
BUN SERPL-MCNC: 9 MG/DL
CALCIUM SERPL-MCNC: 9 MG/DL
CALCIUM SERPL-MCNC: 9.2 MG/DL
CALCIUM SERPL-MCNC: 9.3 MG/DL
CHLORIDE SERPL-SCNC: 100 MMOL/L
CHLORIDE SERPL-SCNC: 100 MMOL/L
CHLORIDE SERPL-SCNC: 98 MMOL/L
CO2 SERPL-SCNC: 24 MMOL/L
CO2 SERPL-SCNC: 24 MMOL/L
CO2 SERPL-SCNC: 25 MMOL/L
CREAT SERPL-MCNC: 0.72 MG/DL
CREAT SERPL-MCNC: 0.73 MG/DL
CREAT SERPL-MCNC: 0.77 MG/DL
EGFR: 101 ML/MIN/1.73M2
EGFR: 101 ML/MIN/1.73M2
EGFR: 99 ML/MIN/1.73M2
GLUCOSE SERPL-MCNC: 237 MG/DL
GLUCOSE SERPL-MCNC: 258 MG/DL
GLUCOSE SERPL-MCNC: 372 MG/DL
MAGNESIUM SERPL-MCNC: 1.4 MG/DL
MAGNESIUM SERPL-MCNC: 1.4 MG/DL
MAGNESIUM SERPL-MCNC: 1.5 MG/DL
POTASSIUM SERPL-SCNC: 4 MMOL/L
POTASSIUM SERPL-SCNC: 4.3 MMOL/L
POTASSIUM SERPL-SCNC: 4.3 MMOL/L
PROT SERPL-MCNC: 6.5 G/DL
PROT SERPL-MCNC: 6.6 G/DL
PROT SERPL-MCNC: 6.7 G/DL
SODIUM SERPL-SCNC: 136 MMOL/L
SODIUM SERPL-SCNC: 137 MMOL/L
SODIUM SERPL-SCNC: 138 MMOL/L

## 2023-12-04 ENCOUNTER — RESULT REVIEW (OUTPATIENT)
Age: 68
End: 2023-12-04

## 2023-12-04 ENCOUNTER — APPOINTMENT (OUTPATIENT)
Dept: HEMATOLOGY ONCOLOGY | Facility: CLINIC | Age: 68
End: 2023-12-04

## 2023-12-04 LAB
BASOPHILS # BLD AUTO: 0.1 K/UL — SIGNIFICANT CHANGE UP (ref 0–0.2)
BASOPHILS # BLD AUTO: 0.1 K/UL — SIGNIFICANT CHANGE UP (ref 0–0.2)
BASOPHILS NFR BLD AUTO: 1.7 % — SIGNIFICANT CHANGE UP (ref 0–2)
BASOPHILS NFR BLD AUTO: 1.7 % — SIGNIFICANT CHANGE UP (ref 0–2)
EOSINOPHIL # BLD AUTO: 0 K/UL — SIGNIFICANT CHANGE UP (ref 0–0.5)
EOSINOPHIL # BLD AUTO: 0 K/UL — SIGNIFICANT CHANGE UP (ref 0–0.5)
EOSINOPHIL NFR BLD AUTO: 0.8 % — SIGNIFICANT CHANGE UP (ref 0–6)
EOSINOPHIL NFR BLD AUTO: 0.8 % — SIGNIFICANT CHANGE UP (ref 0–6)
HCT VFR BLD CALC: 35.1 % — LOW (ref 39–50)
HCT VFR BLD CALC: 35.1 % — LOW (ref 39–50)
HGB BLD-MCNC: 11.4 G/DL — LOW (ref 13–17)
HGB BLD-MCNC: 11.4 G/DL — LOW (ref 13–17)
LYMPHOCYTES # BLD AUTO: 1.1 K/UL — SIGNIFICANT CHANGE UP (ref 1–3.3)
LYMPHOCYTES # BLD AUTO: 1.1 K/UL — SIGNIFICANT CHANGE UP (ref 1–3.3)
LYMPHOCYTES # BLD AUTO: 34 % — SIGNIFICANT CHANGE UP (ref 13–44)
LYMPHOCYTES # BLD AUTO: 34 % — SIGNIFICANT CHANGE UP (ref 13–44)
MCHC RBC-ENTMCNC: 29.7 PG — SIGNIFICANT CHANGE UP (ref 27–34)
MCHC RBC-ENTMCNC: 29.7 PG — SIGNIFICANT CHANGE UP (ref 27–34)
MCHC RBC-ENTMCNC: 32.6 G/DL — SIGNIFICANT CHANGE UP (ref 32–36)
MCHC RBC-ENTMCNC: 32.6 G/DL — SIGNIFICANT CHANGE UP (ref 32–36)
MCV RBC AUTO: 91 FL — SIGNIFICANT CHANGE UP (ref 80–100)
MCV RBC AUTO: 91 FL — SIGNIFICANT CHANGE UP (ref 80–100)
MONOCYTES # BLD AUTO: 0.3 K/UL — SIGNIFICANT CHANGE UP (ref 0–0.9)
MONOCYTES # BLD AUTO: 0.3 K/UL — SIGNIFICANT CHANGE UP (ref 0–0.9)
MONOCYTES NFR BLD AUTO: 10.4 % — SIGNIFICANT CHANGE UP (ref 2–14)
MONOCYTES NFR BLD AUTO: 10.4 % — SIGNIFICANT CHANGE UP (ref 2–14)
NEUTROPHILS # BLD AUTO: 1.8 K/UL — SIGNIFICANT CHANGE UP (ref 1.8–7.4)
NEUTROPHILS # BLD AUTO: 1.8 K/UL — SIGNIFICANT CHANGE UP (ref 1.8–7.4)
NEUTROPHILS NFR BLD AUTO: 53 % — SIGNIFICANT CHANGE UP (ref 43–77)
NEUTROPHILS NFR BLD AUTO: 53 % — SIGNIFICANT CHANGE UP (ref 43–77)
PLATELET # BLD AUTO: 286 K/UL — SIGNIFICANT CHANGE UP (ref 150–400)
PLATELET # BLD AUTO: 286 K/UL — SIGNIFICANT CHANGE UP (ref 150–400)
RBC # BLD: 3.86 M/UL — LOW (ref 4.2–5.8)
RBC # BLD: 3.86 M/UL — LOW (ref 4.2–5.8)
RBC # FLD: 15.6 % — HIGH (ref 10.3–14.5)
RBC # FLD: 15.6 % — HIGH (ref 10.3–14.5)
WBC # BLD: 3.3 K/UL — LOW (ref 3.8–10.5)
WBC # BLD: 3.3 K/UL — LOW (ref 3.8–10.5)
WBC # FLD AUTO: 3.3 K/UL — LOW (ref 3.8–10.5)
WBC # FLD AUTO: 3.3 K/UL — LOW (ref 3.8–10.5)

## 2023-12-05 ENCOUNTER — APPOINTMENT (OUTPATIENT)
Age: 68
End: 2023-12-05

## 2023-12-07 LAB
ALBUMIN SERPL ELPH-MCNC: 4.1 G/DL
ALP BLD-CCNC: 126 U/L
ALT SERPL-CCNC: 18 U/L
ANION GAP SERPL CALC-SCNC: 14 MMOL/L
AST SERPL-CCNC: 16 U/L
BILIRUB SERPL-MCNC: 0.2 MG/DL
BUN SERPL-MCNC: 10 MG/DL
CALCIUM SERPL-MCNC: 8.9 MG/DL
CHLORIDE SERPL-SCNC: 95 MMOL/L
CO2 SERPL-SCNC: 25 MMOL/L
CREAT SERPL-MCNC: 0.69 MG/DL
EGFR: 103 ML/MIN/1.73M2
GLUCOSE SERPL-MCNC: 359 MG/DL
MAGNESIUM SERPL-MCNC: 1.1 MG/DL
POTASSIUM SERPL-SCNC: 3.6 MMOL/L
PROT SERPL-MCNC: 6.7 G/DL
SODIUM SERPL-SCNC: 134 MMOL/L

## 2023-12-08 ENCOUNTER — APPOINTMENT (OUTPATIENT)
Dept: CT IMAGING | Facility: CLINIC | Age: 68
End: 2023-12-08
Payer: MEDICARE

## 2023-12-08 ENCOUNTER — OUTPATIENT (OUTPATIENT)
Dept: OUTPATIENT SERVICES | Facility: HOSPITAL | Age: 68
LOS: 1 days | End: 2023-12-08

## 2023-12-08 DIAGNOSIS — Z98.890 OTHER SPECIFIED POSTPROCEDURAL STATES: Chronic | ICD-10-CM

## 2023-12-08 DIAGNOSIS — C68.9 MALIGNANT NEOPLASM OF URINARY ORGAN, UNSPECIFIED: ICD-10-CM

## 2023-12-08 DIAGNOSIS — Z90.49 ACQUIRED ABSENCE OF OTHER SPECIFIED PARTS OF DIGESTIVE TRACT: Chronic | ICD-10-CM

## 2023-12-08 PROCEDURE — 74177 CT ABD & PELVIS W/CONTRAST: CPT | Mod: 26

## 2023-12-08 PROCEDURE — 71260 CT THORAX DX C+: CPT | Mod: 26

## 2023-12-15 ENCOUNTER — RESULT REVIEW (OUTPATIENT)
Age: 68
End: 2023-12-15

## 2023-12-15 ENCOUNTER — APPOINTMENT (OUTPATIENT)
Dept: HEMATOLOGY ONCOLOGY | Facility: CLINIC | Age: 68
End: 2023-12-15
Payer: MEDICARE

## 2023-12-15 VITALS
WEIGHT: 153.77 LBS | DIASTOLIC BLOOD PRESSURE: 107 MMHG | HEIGHT: 66 IN | HEART RATE: 88 BPM | TEMPERATURE: 98.8 F | OXYGEN SATURATION: 99 % | BODY MASS INDEX: 24.71 KG/M2 | SYSTOLIC BLOOD PRESSURE: 180 MMHG

## 2023-12-15 LAB
BASOPHILS # BLD AUTO: 0.1 K/UL — SIGNIFICANT CHANGE UP (ref 0–0.2)
BASOPHILS # BLD AUTO: 0.1 K/UL — SIGNIFICANT CHANGE UP (ref 0–0.2)
BASOPHILS NFR BLD AUTO: 1.2 % — SIGNIFICANT CHANGE UP (ref 0–2)
BASOPHILS NFR BLD AUTO: 1.2 % — SIGNIFICANT CHANGE UP (ref 0–2)
EOSINOPHIL # BLD AUTO: 0 K/UL — SIGNIFICANT CHANGE UP (ref 0–0.5)
EOSINOPHIL # BLD AUTO: 0 K/UL — SIGNIFICANT CHANGE UP (ref 0–0.5)
EOSINOPHIL NFR BLD AUTO: 0.8 % — SIGNIFICANT CHANGE UP (ref 0–6)
EOSINOPHIL NFR BLD AUTO: 0.8 % — SIGNIFICANT CHANGE UP (ref 0–6)
HCT VFR BLD CALC: 35 % — LOW (ref 39–50)
HCT VFR BLD CALC: 35 % — LOW (ref 39–50)
HGB BLD-MCNC: 11.5 G/DL — LOW (ref 13–17)
HGB BLD-MCNC: 11.5 G/DL — LOW (ref 13–17)
LYMPHOCYTES # BLD AUTO: 1.1 K/UL — SIGNIFICANT CHANGE UP (ref 1–3.3)
LYMPHOCYTES # BLD AUTO: 1.1 K/UL — SIGNIFICANT CHANGE UP (ref 1–3.3)
LYMPHOCYTES # BLD AUTO: 24 % — SIGNIFICANT CHANGE UP (ref 13–44)
LYMPHOCYTES # BLD AUTO: 24 % — SIGNIFICANT CHANGE UP (ref 13–44)
MCHC RBC-ENTMCNC: 30.3 PG — SIGNIFICANT CHANGE UP (ref 27–34)
MCHC RBC-ENTMCNC: 30.3 PG — SIGNIFICANT CHANGE UP (ref 27–34)
MCHC RBC-ENTMCNC: 32.8 G/DL — SIGNIFICANT CHANGE UP (ref 32–36)
MCHC RBC-ENTMCNC: 32.8 G/DL — SIGNIFICANT CHANGE UP (ref 32–36)
MCV RBC AUTO: 92.3 FL — SIGNIFICANT CHANGE UP (ref 80–100)
MCV RBC AUTO: 92.3 FL — SIGNIFICANT CHANGE UP (ref 80–100)
MONOCYTES # BLD AUTO: 0.6 K/UL — SIGNIFICANT CHANGE UP (ref 0–0.9)
MONOCYTES # BLD AUTO: 0.6 K/UL — SIGNIFICANT CHANGE UP (ref 0–0.9)
MONOCYTES NFR BLD AUTO: 14.4 % — HIGH (ref 2–14)
MONOCYTES NFR BLD AUTO: 14.4 % — HIGH (ref 2–14)
NEUTROPHILS # BLD AUTO: 2.6 K/UL — SIGNIFICANT CHANGE UP (ref 1.8–7.4)
NEUTROPHILS # BLD AUTO: 2.6 K/UL — SIGNIFICANT CHANGE UP (ref 1.8–7.4)
NEUTROPHILS NFR BLD AUTO: 59.6 % — SIGNIFICANT CHANGE UP (ref 43–77)
NEUTROPHILS NFR BLD AUTO: 59.6 % — SIGNIFICANT CHANGE UP (ref 43–77)
PLATELET # BLD AUTO: 148 K/UL — LOW (ref 150–400)
PLATELET # BLD AUTO: 148 K/UL — LOW (ref 150–400)
RBC # BLD: 3.79 M/UL — LOW (ref 4.2–5.8)
RBC # BLD: 3.79 M/UL — LOW (ref 4.2–5.8)
RBC # FLD: 16.6 % — HIGH (ref 10.3–14.5)
RBC # FLD: 16.6 % — HIGH (ref 10.3–14.5)
WBC # BLD: 4.4 K/UL — SIGNIFICANT CHANGE UP (ref 3.8–10.5)
WBC # BLD: 4.4 K/UL — SIGNIFICANT CHANGE UP (ref 3.8–10.5)
WBC # FLD AUTO: 4.4 K/UL — SIGNIFICANT CHANGE UP (ref 3.8–10.5)
WBC # FLD AUTO: 4.4 K/UL — SIGNIFICANT CHANGE UP (ref 3.8–10.5)

## 2023-12-15 PROCEDURE — 99214 OFFICE O/P EST MOD 30 MIN: CPT

## 2023-12-26 ENCOUNTER — APPOINTMENT (OUTPATIENT)
Dept: UROLOGY | Facility: CLINIC | Age: 68
End: 2023-12-26

## 2024-01-04 ENCOUNTER — OUTPATIENT (OUTPATIENT)
Dept: OUTPATIENT SERVICES | Facility: HOSPITAL | Age: 69
LOS: 1 days | Discharge: HOME | End: 2024-01-04

## 2024-01-04 DIAGNOSIS — C78.6 SECONDARY MALIGNANT NEOPLASM OF RETROPERITONEUM AND PERITONEUM: ICD-10-CM

## 2024-01-04 DIAGNOSIS — C68.1: ICD-10-CM

## 2024-01-04 DIAGNOSIS — R18.0 MALIGNANT ASCITES: ICD-10-CM

## 2024-01-04 DIAGNOSIS — Z90.49 ACQUIRED ABSENCE OF OTHER SPECIFIED PARTS OF DIGESTIVE TRACT: Chronic | ICD-10-CM

## 2024-01-08 ENCOUNTER — RESULT REVIEW (OUTPATIENT)
Age: 69
End: 2024-01-08

## 2024-01-08 ENCOUNTER — APPOINTMENT (OUTPATIENT)
Age: 69
End: 2024-01-08

## 2024-01-08 LAB
ALBUMIN SERPL ELPH-MCNC: 3.6 G/DL — SIGNIFICANT CHANGE UP (ref 3.3–5)
ALBUMIN SERPL ELPH-MCNC: 3.6 G/DL — SIGNIFICANT CHANGE UP (ref 3.3–5)
ALP SERPL-CCNC: 110 U/L — SIGNIFICANT CHANGE UP (ref 40–120)
ALP SERPL-CCNC: 110 U/L — SIGNIFICANT CHANGE UP (ref 40–120)
ALT FLD-CCNC: 11 U/L — SIGNIFICANT CHANGE UP (ref 10–45)
ALT FLD-CCNC: 11 U/L — SIGNIFICANT CHANGE UP (ref 10–45)
ANION GAP SERPL CALC-SCNC: 11 MMOL/L — SIGNIFICANT CHANGE UP (ref 5–17)
ANION GAP SERPL CALC-SCNC: 11 MMOL/L — SIGNIFICANT CHANGE UP (ref 5–17)
AST SERPL-CCNC: 12 U/L — SIGNIFICANT CHANGE UP (ref 10–40)
AST SERPL-CCNC: 12 U/L — SIGNIFICANT CHANGE UP (ref 10–40)
BASOPHILS # BLD AUTO: 0.1 K/UL — SIGNIFICANT CHANGE UP (ref 0–0.2)
BASOPHILS # BLD AUTO: 0.1 K/UL — SIGNIFICANT CHANGE UP (ref 0–0.2)
BASOPHILS NFR BLD AUTO: 0.7 % — SIGNIFICANT CHANGE UP (ref 0–2)
BASOPHILS NFR BLD AUTO: 0.7 % — SIGNIFICANT CHANGE UP (ref 0–2)
BILIRUB SERPL-MCNC: 0.7 MG/DL — SIGNIFICANT CHANGE UP (ref 0.2–1.2)
BILIRUB SERPL-MCNC: 0.7 MG/DL — SIGNIFICANT CHANGE UP (ref 0.2–1.2)
BUN SERPL-MCNC: 12 MG/DL — SIGNIFICANT CHANGE UP (ref 7–23)
BUN SERPL-MCNC: 12 MG/DL — SIGNIFICANT CHANGE UP (ref 7–23)
CALCIUM SERPL-MCNC: 8.9 MG/DL — SIGNIFICANT CHANGE UP (ref 8.4–10.5)
CALCIUM SERPL-MCNC: 8.9 MG/DL — SIGNIFICANT CHANGE UP (ref 8.4–10.5)
CHLORIDE SERPL-SCNC: 97 MMOL/L — SIGNIFICANT CHANGE UP (ref 96–108)
CHLORIDE SERPL-SCNC: 97 MMOL/L — SIGNIFICANT CHANGE UP (ref 96–108)
CO2 SERPL-SCNC: 25 MMOL/L — SIGNIFICANT CHANGE UP (ref 22–31)
CO2 SERPL-SCNC: 25 MMOL/L — SIGNIFICANT CHANGE UP (ref 22–31)
CREAT SERPL-MCNC: 0.94 MG/DL — SIGNIFICANT CHANGE UP (ref 0.5–1.3)
CREAT SERPL-MCNC: 0.94 MG/DL — SIGNIFICANT CHANGE UP (ref 0.5–1.3)
EGFR: 89 ML/MIN/1.73M2 — SIGNIFICANT CHANGE UP
EGFR: 89 ML/MIN/1.73M2 — SIGNIFICANT CHANGE UP
EOSINOPHIL # BLD AUTO: 0 K/UL — SIGNIFICANT CHANGE UP (ref 0–0.5)
EOSINOPHIL # BLD AUTO: 0 K/UL — SIGNIFICANT CHANGE UP (ref 0–0.5)
EOSINOPHIL NFR BLD AUTO: 0.3 % — SIGNIFICANT CHANGE UP (ref 0–6)
EOSINOPHIL NFR BLD AUTO: 0.3 % — SIGNIFICANT CHANGE UP (ref 0–6)
GLUCOSE SERPL-MCNC: 253 MG/DL — HIGH (ref 70–99)
GLUCOSE SERPL-MCNC: 253 MG/DL — HIGH (ref 70–99)
HCT VFR BLD CALC: 32 % — LOW (ref 39–50)
HCT VFR BLD CALC: 32 % — LOW (ref 39–50)
HGB BLD-MCNC: 10.6 G/DL — LOW (ref 13–17)
HGB BLD-MCNC: 10.6 G/DL — LOW (ref 13–17)
LYMPHOCYTES # BLD AUTO: 1 K/UL — SIGNIFICANT CHANGE UP (ref 1–3.3)
LYMPHOCYTES # BLD AUTO: 1 K/UL — SIGNIFICANT CHANGE UP (ref 1–3.3)
LYMPHOCYTES # BLD AUTO: 12.4 % — LOW (ref 13–44)
LYMPHOCYTES # BLD AUTO: 12.4 % — LOW (ref 13–44)
MAGNESIUM SERPL-MCNC: 1.5 MG/DL — LOW (ref 1.6–2.6)
MAGNESIUM SERPL-MCNC: 1.5 MG/DL — LOW (ref 1.6–2.6)
MCHC RBC-ENTMCNC: 30.9 PG — SIGNIFICANT CHANGE UP (ref 27–34)
MCHC RBC-ENTMCNC: 30.9 PG — SIGNIFICANT CHANGE UP (ref 27–34)
MCHC RBC-ENTMCNC: 33.1 G/DL — SIGNIFICANT CHANGE UP (ref 32–36)
MCHC RBC-ENTMCNC: 33.1 G/DL — SIGNIFICANT CHANGE UP (ref 32–36)
MCV RBC AUTO: 93.3 FL — SIGNIFICANT CHANGE UP (ref 80–100)
MCV RBC AUTO: 93.3 FL — SIGNIFICANT CHANGE UP (ref 80–100)
MONOCYTES # BLD AUTO: 0.8 K/UL — SIGNIFICANT CHANGE UP (ref 0–0.9)
MONOCYTES # BLD AUTO: 0.8 K/UL — SIGNIFICANT CHANGE UP (ref 0–0.9)
MONOCYTES NFR BLD AUTO: 10.4 % — SIGNIFICANT CHANGE UP (ref 2–14)
MONOCYTES NFR BLD AUTO: 10.4 % — SIGNIFICANT CHANGE UP (ref 2–14)
NEUTROPHILS # BLD AUTO: 6 K/UL — SIGNIFICANT CHANGE UP (ref 1.8–7.4)
NEUTROPHILS # BLD AUTO: 6 K/UL — SIGNIFICANT CHANGE UP (ref 1.8–7.4)
NEUTROPHILS NFR BLD AUTO: 76.2 % — SIGNIFICANT CHANGE UP (ref 43–77)
NEUTROPHILS NFR BLD AUTO: 76.2 % — SIGNIFICANT CHANGE UP (ref 43–77)
PLATELET # BLD AUTO: 188 K/UL — SIGNIFICANT CHANGE UP (ref 150–400)
PLATELET # BLD AUTO: 188 K/UL — SIGNIFICANT CHANGE UP (ref 150–400)
POTASSIUM SERPL-MCNC: 3.6 MMOL/L — SIGNIFICANT CHANGE UP (ref 3.5–5.3)
POTASSIUM SERPL-MCNC: 3.6 MMOL/L — SIGNIFICANT CHANGE UP (ref 3.5–5.3)
POTASSIUM SERPL-SCNC: 3.6 MMOL/L — SIGNIFICANT CHANGE UP (ref 3.5–5.3)
POTASSIUM SERPL-SCNC: 3.6 MMOL/L — SIGNIFICANT CHANGE UP (ref 3.5–5.3)
PROT SERPL-MCNC: 6.8 G/DL — SIGNIFICANT CHANGE UP (ref 6–8.3)
PROT SERPL-MCNC: 6.8 G/DL — SIGNIFICANT CHANGE UP (ref 6–8.3)
RBC # BLD: 3.42 M/UL — LOW (ref 4.2–5.8)
RBC # BLD: 3.42 M/UL — LOW (ref 4.2–5.8)
RBC # FLD: 13 % — SIGNIFICANT CHANGE UP (ref 10.3–14.5)
RBC # FLD: 13 % — SIGNIFICANT CHANGE UP (ref 10.3–14.5)
SODIUM SERPL-SCNC: 133 MMOL/L — LOW (ref 135–145)
SODIUM SERPL-SCNC: 133 MMOL/L — LOW (ref 135–145)
T4 FREE+ TSH PNL SERPL: 2.89 UIU/ML — SIGNIFICANT CHANGE UP (ref 0.27–4.2)
T4 FREE+ TSH PNL SERPL: 2.89 UIU/ML — SIGNIFICANT CHANGE UP (ref 0.27–4.2)
WBC # BLD: 7.8 K/UL — SIGNIFICANT CHANGE UP (ref 3.8–10.5)
WBC # BLD: 7.8 K/UL — SIGNIFICANT CHANGE UP (ref 3.8–10.5)
WBC # FLD AUTO: 7.8 K/UL — SIGNIFICANT CHANGE UP (ref 3.8–10.5)
WBC # FLD AUTO: 7.8 K/UL — SIGNIFICANT CHANGE UP (ref 3.8–10.5)

## 2024-01-09 DIAGNOSIS — Z51.11 ENCOUNTER FOR ANTINEOPLASTIC CHEMOTHERAPY: ICD-10-CM

## 2024-01-09 DIAGNOSIS — R11.2 NAUSEA WITH VOMITING, UNSPECIFIED: ICD-10-CM

## 2024-01-09 NOTE — PHYSICAL EXAM
[Restricted in physically strenuous activity but ambulatory and able to carry out work of a light or sedentary nature] : Status 1- Restricted in physically strenuous activity but ambulatory and able to carry out work of a light or sedentary nature, e.g., light house work, office work [Normal] : affect appropriate [de-identified] : +ascites [de-identified] : nephrostomy tube

## 2024-01-09 NOTE — HISTORY OF PRESENT ILLNESS
[de-identified] : Mr Zimmer is a very pleasant 67 year old male with a history of HTN, T2DM, and ascites, who was found to have peritoneal carcinomatosis, and Left hydronephrosis s/p Left ureteroscopy and biopsy and a subsequent bilateral pulmonary embolus with pathology consistent with metastatic urothelial carcinoma. He was found to have ascites in [facility] and then presented to [facility] with persistent left flank pain found to have hydronephrosis due to a ureteral lesion as well as omental densities suspicious for underlying malignancy. Pt admitted to [facility] for hydronephrosis. Urology and GI were consulted. Pt underwent nephrostomy tube placement on 8/2 and tolerated procedure well without any complications.  GI was consulted for omental densities founded on imaging.  given possibility for malignancy, IR was consulted, and recommended IR biopsy and/or paracentesis as Outpatient.  Ascites was positive for malignant cells.  CT guided omental biopsy was performed on 08/07/23.  Pathology showed a poorly differentiated carcinoma. IHC was negative for calretinin, GATA3, chromogranin, synaptophysin, SOX10, S100, and D2-40 (podoplanin).  Immunostains for CD3 and CD20 highlights background T and B cells. The overall histology and immunophenotype is consistent with a poorly differentiated carcinoma, primary site undetermined(possible source is  tract and clinical correlation recommended). On 08/24/23 he underwent cystoscopy with left retrograde pyelogram, left ureteroscopy with biopsy of mid left ureteral mass.  That biopsy result is pending. He was readmitted to the hospital after that for shortness of breath and was found to have a bilateral pulmonary embolus and he was started on eliquis. Today he comes for medical oncology consultation. He feels SOB when he lies down flat.  His O2 sat is 98% on RA. He denies any fever, chills, chest pain, SOB, nausea, vomiting, diarrhea, constipation, dysuria, hematuria, hematochezia, or melena.  [de-identified] : Patient presents on C4D19 [place]/Cis for metastatic urothelial carcinoma.  Nephrostomy tube removed ~1 month ago, urinating well, good stream, no hematuria.  + Fatigue, D1, sometimes D2 then resolves.  + Nausea, no emesis, less frequent, not even using antiemetics. No diarrhea or constipation. + Dysgeusia, mostly just D1 then resolves appetite is normal and weight stable. No rashes. No edema. No fevers or chills.

## 2024-01-09 NOTE — ASSESSMENT
[FreeTextEntry1] : Mr Zimmer is a very pleasant 67 year old man with a history of hypertension, DM, ascites, and bilateral PE who was recently found to have carcinomatosis and a left ureteral mass consistent with stage IV urothelial carcinoma   #Urothelial carcinoma: Stage IV disease - final pathology from ureteral mass: infiltrating poorly differentiated carcinoma  - Dr. Mcclain recommended palliative gemcitabine/cisplatin, D1 and D8 Q21 days x 4 cycles then obtain imaging. - will be followed by avelumab maintenance if no disease progression on imaging. - today is C4D19.  Tolerating well, side effects early in the cycle then resolve - CT to assess response done on 12/8/23:  No evidence of disease.  Previously noted ascites and peritoneal carcinomatosis have resolved. - will request [facility] full genetic interrogation of the tumor to assess for actionable mutations to further guide therapy if needed for POD.  - RTC for treatment and MD/PA follow up on ~D15

## 2024-01-18 ENCOUNTER — APPOINTMENT (OUTPATIENT)
Dept: HEMATOLOGY ONCOLOGY | Facility: CLINIC | Age: 69
End: 2024-01-18
Payer: MEDICARE

## 2024-01-18 ENCOUNTER — RESULT REVIEW (OUTPATIENT)
Age: 69
End: 2024-01-18

## 2024-01-18 VITALS
DIASTOLIC BLOOD PRESSURE: 96 MMHG | SYSTOLIC BLOOD PRESSURE: 151 MMHG | BODY MASS INDEX: 23.33 KG/M2 | HEIGHT: 66 IN | HEART RATE: 98 BPM | OXYGEN SATURATION: 96 % | WEIGHT: 145.17 LBS

## 2024-01-18 PROCEDURE — 99215 OFFICE O/P EST HI 40 MIN: CPT

## 2024-01-18 NOTE — REVIEW OF SYSTEMS
[Patient Intake Form Reviewed] : Patient intake form was reviewed [Fever] : no fever [Fatigue] : no fatigue [Recent Change In Weight] : ~T no recent weight change [Eye Pain] : no eye pain [Vision Problems] : no vision problems [Dysphagia] : no dysphagia [Hoarseness] : no hoarseness [Odynophagia] : no odynophagia [Chest Pain] : no chest pain [Lower Ext Edema] : no lower extremity edema [Shortness Of Breath] : no shortness of breath [SOB on Exertion] : no shortness of breath during exertion [Abdominal Pain] : no abdominal pain [Vomiting] : no vomiting [Constipation] : no constipation [Dysuria] : no dysuria [Joint Pain] : no joint pain [Skin Rash] : no skin rash [Easy Bleeding] : no tendency for easy bleeding [Easy Bruising] : no tendency for easy bruising [Swollen Glands] : no swollen glands

## 2024-01-18 NOTE — HISTORY OF PRESENT ILLNESS
[de-identified] : Mr Zimmer is a very pleasant 67 year old male with a history of HTN, T2DM, and ascites, who was found to have peritoneal carcinomatosis, and Left hydronephrosis s/p Left ureteroscopy and biopsy and a subsequent bilateral pulmonary embolus with pathology consistent with metastatic urothelial carcinoma. He was found to have ascites in [facility] and then presented to [facility] with persistent left flank pain found to have hydronephrosis due to a ureteral lesion as well as omental densities suspicious for underlying malignancy. Pt admitted to [facility] for hydronephrosis. Urology and GI were consulted. Pt underwent nephrostomy tube placement on 8/2 and tolerated procedure well without any complications.  GI was consulted for omental densities founded on imaging.  given possibility for malignancy, IR was consulted, and recommended IR biopsy and/or paracentesis as Outpatient.  Ascites was positive for malignant cells.  CT guided omental biopsy was performed on 08/07/23.  Pathology showed a poorly differentiated carcinoma. IHC was negative for calretinin, GATA3, chromogranin, synaptophysin, SOX10, S100, and D2-40 (podoplanin).  Immunostains for CD3 and CD20 highlights background T and B cells. The overall histology and immunophenotype is consistent with a poorly differentiated carcinoma, primary site undetermined(possible source is  tract and clinical correlation recommended). On 08/24/23 he underwent cystoscopy with left retrograde pyelogram, left ureteroscopy with biopsy of mid left ureteral mass.  That biopsy result is pending. He was readmitted to the hospital after that for shortness of breath and was found to have a bilateral pulmonary embolus and he was started on eliquis. Today he comes for medical oncology consultation. He feels SOB when he lies down flat.  His O2 sat is 98% on RA. He denies any fever, chills, chest pain, SOB, nausea, vomiting, diarrhea, constipation, dysuria, hematuria, hematochezia, or melena.  [de-identified] : Patient presents on C4D19 [place]/Cis for metastatic urothelial carcinoma.  Nephrostomy tube removed ~1 month ago, urinating well, good stream, no hematuria.  + Fatigue, D1, sometimes D2 then resolves.  + Nausea, no emesis, less frequent, not even using antiemetics. No diarrhea or constipation. + Dysgeusia, mostly just D1 then resolves appetite is normal and weight stable. No rashes. No edema. No fevers or chills.   01/18/24: Mr Zimmer returns for follow up. He had a complete response with gem/cis and is now on maintenance avelumab. Tolerating well, feeling well. Sugars are high, will discuss with his PCP.  Will plan for imaging in March and he will follow up after to discuss the results.

## 2024-01-18 NOTE — ASSESSMENT
[FreeTextEntry1] : Mr Zimmer is a very pleasant 67 year old man with a history of hypertension, DM, ascites, and bilateral PE who was recently found to have carcinomatosis and a left ureteral mass consistent with stage IV urothelial carcinoma   #Urothelial carcinoma: Stage IV disease - final pathology from ureteral mass: infiltrating poorly differentiated carcinoma  - Dr. Mcclain recommended palliative gemcitabine/cisplatin, D1 and D8 Q21 days x 4 cycles then obtain imaging. - will be followed by avelumab maintenance if no disease progression on imaging. - CT to assess response done on 12/8/23:  No evidence of disease.  Previously noted ascites and peritoneal carcinomatosis have resolved. - will request [facility] full genetic interrogation of the tumor to assess for actionable mutations to further guide therapy if needed for POD.  01/18/24: Mr Zimmer returns for follow up. He had a complete response with gem/cis and is now on maintenance avelumab. Tolerating well, feeling well. Sugars are high, will discuss with his PCP.  Will plan for imaging in March and he will follow up after to discuss the results.

## 2024-01-18 NOTE — PHYSICAL EXAM
[Restricted in physically strenuous activity but ambulatory and able to carry out work of a light or sedentary nature] : Status 1- Restricted in physically strenuous activity but ambulatory and able to carry out work of a light or sedentary nature, e.g., light house work, office work [Normal] : affect appropriate [de-identified] : +ascites [de-identified] : nephrostomy tube

## 2024-01-22 ENCOUNTER — RESULT REVIEW (OUTPATIENT)
Age: 69
End: 2024-01-22

## 2024-01-22 ENCOUNTER — APPOINTMENT (OUTPATIENT)
Age: 69
End: 2024-01-22

## 2024-01-22 LAB
BASOPHILS # BLD AUTO: 0.1 K/UL — SIGNIFICANT CHANGE UP (ref 0–0.2)
BASOPHILS NFR BLD AUTO: 0.5 % — SIGNIFICANT CHANGE UP (ref 0–2)
EOSINOPHIL # BLD AUTO: 0 K/UL — SIGNIFICANT CHANGE UP (ref 0–0.5)
EOSINOPHIL NFR BLD AUTO: 0.2 % — SIGNIFICANT CHANGE UP (ref 0–6)
HCT VFR BLD CALC: 33.7 % — LOW (ref 39–50)
HGB BLD-MCNC: 11.3 G/DL — LOW (ref 13–17)
LYMPHOCYTES # BLD AUTO: 1.2 K/UL — SIGNIFICANT CHANGE UP (ref 1–3.3)
LYMPHOCYTES # BLD AUTO: 10.1 % — LOW (ref 13–44)
MCHC RBC-ENTMCNC: 29.6 PG — SIGNIFICANT CHANGE UP (ref 27–34)
MCHC RBC-ENTMCNC: 33.5 G/DL — SIGNIFICANT CHANGE UP (ref 32–36)
MCV RBC AUTO: 88.4 FL — SIGNIFICANT CHANGE UP (ref 80–100)
MONOCYTES # BLD AUTO: 0.6 K/UL — SIGNIFICANT CHANGE UP (ref 0–0.9)
MONOCYTES NFR BLD AUTO: 5.1 % — SIGNIFICANT CHANGE UP (ref 2–14)
NEUTROPHILS # BLD AUTO: 10.2 K/UL — HIGH (ref 1.8–7.4)
NEUTROPHILS NFR BLD AUTO: 84 % — HIGH (ref 43–77)
PLATELET # BLD AUTO: 298 K/UL — SIGNIFICANT CHANGE UP (ref 150–400)
RBC # BLD: 3.81 M/UL — LOW (ref 4.2–5.8)
RBC # FLD: 12.2 % — SIGNIFICANT CHANGE UP (ref 10.3–14.5)
WBC # BLD: 12.1 K/UL — HIGH (ref 3.8–10.5)
WBC # FLD AUTO: 12.1 K/UL — HIGH (ref 3.8–10.5)

## 2024-01-23 LAB
ALBUMIN SERPL ELPH-MCNC: 3.4 G/DL — SIGNIFICANT CHANGE UP (ref 3.3–5)
ALP SERPL-CCNC: 102 U/L — SIGNIFICANT CHANGE UP (ref 40–120)
ALT FLD-CCNC: 12 U/L — SIGNIFICANT CHANGE UP (ref 10–45)
ANION GAP SERPL CALC-SCNC: 16 MMOL/L — SIGNIFICANT CHANGE UP (ref 5–17)
AST SERPL-CCNC: 17 U/L — SIGNIFICANT CHANGE UP (ref 10–40)
BILIRUB SERPL-MCNC: 0.4 MG/DL — SIGNIFICANT CHANGE UP (ref 0.2–1.2)
BUN SERPL-MCNC: 15 MG/DL — SIGNIFICANT CHANGE UP (ref 7–23)
CALCIUM SERPL-MCNC: 8.9 MG/DL — SIGNIFICANT CHANGE UP (ref 8.4–10.5)
CHLORIDE SERPL-SCNC: 93 MMOL/L — LOW (ref 96–108)
CO2 SERPL-SCNC: 23 MMOL/L — SIGNIFICANT CHANGE UP (ref 22–31)
CREAT SERPL-MCNC: 1.03 MG/DL — SIGNIFICANT CHANGE UP (ref 0.5–1.3)
EGFR: 80 ML/MIN/1.73M2 — SIGNIFICANT CHANGE UP
GLUCOSE SERPL-MCNC: 503 MG/DL — CRITICAL HIGH (ref 70–99)
MAGNESIUM SERPL-MCNC: 1.8 MG/DL — SIGNIFICANT CHANGE UP (ref 1.6–2.6)
POTASSIUM SERPL-MCNC: 3.9 MMOL/L — SIGNIFICANT CHANGE UP (ref 3.5–5.3)
POTASSIUM SERPL-SCNC: 3.9 MMOL/L — SIGNIFICANT CHANGE UP (ref 3.5–5.3)
PROT SERPL-MCNC: 7.8 G/DL — SIGNIFICANT CHANGE UP (ref 6–8.3)
SODIUM SERPL-SCNC: 132 MMOL/L — LOW (ref 135–145)
T4 FREE+ TSH PNL SERPL: 1.73 UIU/ML — SIGNIFICANT CHANGE UP (ref 0.27–4.2)

## 2024-01-24 ENCOUNTER — NON-APPOINTMENT (OUTPATIENT)
Age: 69
End: 2024-01-24

## 2024-02-12 LAB
ALBUMIN SERPL ELPH-MCNC: 4.2 G/DL
ALP BLD-CCNC: 138 U/L
ALT SERPL-CCNC: 17 U/L
ANION GAP SERPL CALC-SCNC: 11 MMOL/L
AST SERPL-CCNC: 13 U/L
BILIRUB SERPL-MCNC: 0.2 MG/DL
BUN SERPL-MCNC: 13 MG/DL
CALCIUM SERPL-MCNC: 9.1 MG/DL
CHLORIDE SERPL-SCNC: 100 MMOL/L
CO2 SERPL-SCNC: 26 MMOL/L
CREAT SERPL-MCNC: 0.79 MG/DL
EGFR: 99 ML/MIN/1.73M2
GLUCOSE SERPL-MCNC: 397 MG/DL
MAGNESIUM SERPL-MCNC: 1.5 MG/DL
POTASSIUM SERPL-SCNC: 4.5 MMOL/L
PROT SERPL-MCNC: 6.5 G/DL
SODIUM SERPL-SCNC: 138 MMOL/L

## 2024-02-16 ENCOUNTER — RESULT REVIEW (OUTPATIENT)
Age: 69
End: 2024-02-16

## 2024-02-16 ENCOUNTER — APPOINTMENT (OUTPATIENT)
Age: 69
End: 2024-02-16

## 2024-02-16 LAB
ALBUMIN SERPL ELPH-MCNC: 3.2 G/DL — LOW (ref 3.3–5)
ALP SERPL-CCNC: 111 U/L — SIGNIFICANT CHANGE UP (ref 40–120)
ALT FLD-CCNC: 14 U/L — SIGNIFICANT CHANGE UP (ref 10–45)
ANION GAP SERPL CALC-SCNC: 10 MMOL/L — SIGNIFICANT CHANGE UP (ref 5–17)
AST SERPL-CCNC: 18 U/L — SIGNIFICANT CHANGE UP (ref 10–40)
BASOPHILS # BLD AUTO: 0.1 K/UL — SIGNIFICANT CHANGE UP (ref 0–0.2)
BASOPHILS NFR BLD AUTO: 0.9 % — SIGNIFICANT CHANGE UP (ref 0–2)
BILIRUB SERPL-MCNC: 0.4 MG/DL — SIGNIFICANT CHANGE UP (ref 0.2–1.2)
BUN SERPL-MCNC: 11 MG/DL — SIGNIFICANT CHANGE UP (ref 7–23)
CALCIUM SERPL-MCNC: 9.2 MG/DL — SIGNIFICANT CHANGE UP (ref 8.4–10.5)
CHLORIDE SERPL-SCNC: 97 MMOL/L — SIGNIFICANT CHANGE UP (ref 96–108)
CO2 SERPL-SCNC: 26 MMOL/L — SIGNIFICANT CHANGE UP (ref 22–31)
CREAT SERPL-MCNC: 1.03 MG/DL — SIGNIFICANT CHANGE UP (ref 0.5–1.3)
EGFR: 80 ML/MIN/1.73M2 — SIGNIFICANT CHANGE UP
EOSINOPHIL # BLD AUTO: 0.1 K/UL — SIGNIFICANT CHANGE UP (ref 0–0.5)
EOSINOPHIL NFR BLD AUTO: 1.3 % — SIGNIFICANT CHANGE UP (ref 0–6)
GLUCOSE SERPL-MCNC: 131 MG/DL — HIGH (ref 70–99)
HCT VFR BLD CALC: 32.2 % — LOW (ref 39–50)
HGB BLD-MCNC: 10.7 G/DL — LOW (ref 13–17)
LYMPHOCYTES # BLD AUTO: 1.3 K/UL — SIGNIFICANT CHANGE UP (ref 1–3.3)
LYMPHOCYTES # BLD AUTO: 17 % — SIGNIFICANT CHANGE UP (ref 13–44)
MCHC RBC-ENTMCNC: 28.7 PG — SIGNIFICANT CHANGE UP (ref 27–34)
MCHC RBC-ENTMCNC: 33.2 G/DL — SIGNIFICANT CHANGE UP (ref 32–36)
MCV RBC AUTO: 86.3 FL — SIGNIFICANT CHANGE UP (ref 80–100)
MONOCYTES # BLD AUTO: 0.5 K/UL — SIGNIFICANT CHANGE UP (ref 0–0.9)
MONOCYTES NFR BLD AUTO: 7 % — SIGNIFICANT CHANGE UP (ref 2–14)
NEUTROPHILS # BLD AUTO: 5.5 K/UL — SIGNIFICANT CHANGE UP (ref 1.8–7.4)
NEUTROPHILS NFR BLD AUTO: 73.8 % — SIGNIFICANT CHANGE UP (ref 43–77)
PLATELET # BLD AUTO: 358 K/UL — SIGNIFICANT CHANGE UP (ref 150–400)
POTASSIUM SERPL-MCNC: 4.1 MMOL/L — SIGNIFICANT CHANGE UP (ref 3.5–5.3)
POTASSIUM SERPL-SCNC: 4.1 MMOL/L — SIGNIFICANT CHANGE UP (ref 3.5–5.3)
PROT SERPL-MCNC: 7.8 G/DL — SIGNIFICANT CHANGE UP (ref 6–8.3)
RBC # BLD: 3.73 M/UL — LOW (ref 4.2–5.8)
RBC # FLD: 12.3 % — SIGNIFICANT CHANGE UP (ref 10.3–14.5)
SODIUM SERPL-SCNC: 133 MMOL/L — LOW (ref 135–145)
TSH SERPL-MCNC: 2.85 UIU/ML — SIGNIFICANT CHANGE UP (ref 0.27–4.2)
WBC # BLD: 7.4 K/UL — SIGNIFICANT CHANGE UP (ref 3.8–10.5)
WBC # FLD AUTO: 7.4 K/UL — SIGNIFICANT CHANGE UP (ref 3.8–10.5)

## 2024-02-16 RX ORDER — PROCHLORPERAZINE MALEATE 10 MG/1
10 TABLET ORAL EVERY 6 HOURS
Qty: 120 | Refills: 3 | Status: ACTIVE | COMMUNITY
Start: 2024-02-16 | End: 1900-01-01

## 2024-02-16 RX ORDER — ONDANSETRON 8 MG/1
8 TABLET, ORALLY DISINTEGRATING ORAL EVERY 8 HOURS
Qty: 90 | Refills: 3 | Status: ACTIVE | COMMUNITY
Start: 2023-09-21 | End: 1900-01-01

## 2024-02-18 NOTE — DISCHARGE NOTE PROVIDER - ATTENDING DISCHARGE PHYSICAL EXAM:
[FreeTextEntry1] : 63 y/o female, S/P Repair of Incarcerated Incisional Hernia with Myofascial Flaps, and Small Bowel Resection on 1/28/2024. Attending Discharge Physical Examination:

## 2024-03-01 ENCOUNTER — OUTPATIENT (OUTPATIENT)
Dept: OUTPATIENT SERVICES | Facility: HOSPITAL | Age: 69
LOS: 1 days | Discharge: HOME | End: 2024-03-01

## 2024-03-01 DIAGNOSIS — Z90.49 ACQUIRED ABSENCE OF OTHER SPECIFIED PARTS OF DIGESTIVE TRACT: Chronic | ICD-10-CM

## 2024-03-01 DIAGNOSIS — Z98.890 OTHER SPECIFIED POSTPROCEDURAL STATES: Chronic | ICD-10-CM

## 2024-03-01 DIAGNOSIS — C68.1: ICD-10-CM

## 2024-03-04 ENCOUNTER — APPOINTMENT (OUTPATIENT)
Dept: CT IMAGING | Facility: CLINIC | Age: 69
End: 2024-03-04
Payer: MEDICARE

## 2024-03-04 ENCOUNTER — OUTPATIENT (OUTPATIENT)
Dept: OUTPATIENT SERVICES | Facility: HOSPITAL | Age: 69
LOS: 1 days | End: 2024-03-04

## 2024-03-04 DIAGNOSIS — Z98.890 OTHER SPECIFIED POSTPROCEDURAL STATES: Chronic | ICD-10-CM

## 2024-03-04 DIAGNOSIS — Z90.49 ACQUIRED ABSENCE OF OTHER SPECIFIED PARTS OF DIGESTIVE TRACT: Chronic | ICD-10-CM

## 2024-03-04 DIAGNOSIS — C68.9 MALIGNANT NEOPLASM OF URINARY ORGAN, UNSPECIFIED: ICD-10-CM

## 2024-03-04 PROCEDURE — 71260 CT THORAX DX C+: CPT | Mod: 26

## 2024-03-04 PROCEDURE — 74177 CT ABD & PELVIS W/CONTRAST: CPT | Mod: 26

## 2024-03-08 ENCOUNTER — APPOINTMENT (OUTPATIENT)
Age: 69
End: 2024-03-08

## 2024-03-08 ENCOUNTER — RESULT REVIEW (OUTPATIENT)
Age: 69
End: 2024-03-08

## 2024-03-08 LAB
ALBUMIN SERPL ELPH-MCNC: 3.5 G/DL — SIGNIFICANT CHANGE UP (ref 3.3–5)
ALP SERPL-CCNC: 89 U/L — SIGNIFICANT CHANGE UP (ref 40–120)
ALT FLD-CCNC: 9 U/L — LOW (ref 10–45)
ANION GAP SERPL CALC-SCNC: 13 MMOL/L — SIGNIFICANT CHANGE UP (ref 5–17)
AST SERPL-CCNC: 8 U/L — LOW (ref 10–40)
BILIRUB SERPL-MCNC: 0.5 MG/DL — SIGNIFICANT CHANGE UP (ref 0.2–1.2)
BUN SERPL-MCNC: 21 MG/DL — SIGNIFICANT CHANGE UP (ref 7–23)
CALCIUM SERPL-MCNC: 9.7 MG/DL — SIGNIFICANT CHANGE UP (ref 8.4–10.5)
CHLORIDE SERPL-SCNC: 98 MMOL/L — SIGNIFICANT CHANGE UP (ref 96–108)
CO2 SERPL-SCNC: 22 MMOL/L — SIGNIFICANT CHANGE UP (ref 22–31)
CREAT SERPL-MCNC: 1.02 MG/DL — SIGNIFICANT CHANGE UP (ref 0.5–1.3)
EGFR: 81 ML/MIN/1.73M2 — SIGNIFICANT CHANGE UP
GLUCOSE SERPL-MCNC: 242 MG/DL — HIGH (ref 70–99)
POTASSIUM SERPL-MCNC: 4.2 MMOL/L — SIGNIFICANT CHANGE UP (ref 3.5–5.3)
POTASSIUM SERPL-SCNC: 4.2 MMOL/L — SIGNIFICANT CHANGE UP (ref 3.5–5.3)
PROT SERPL-MCNC: 8.2 G/DL — SIGNIFICANT CHANGE UP (ref 6–8.3)
SODIUM SERPL-SCNC: 133 MMOL/L — LOW (ref 135–145)

## 2024-03-09 LAB — TSH SERPL-MCNC: 9.48 UIU/ML — HIGH (ref 0.27–4.2)

## 2024-03-11 DIAGNOSIS — R11.2 NAUSEA WITH VOMITING, UNSPECIFIED: ICD-10-CM

## 2024-03-11 DIAGNOSIS — Z51.11 ENCOUNTER FOR ANTINEOPLASTIC CHEMOTHERAPY: ICD-10-CM

## 2024-03-14 ENCOUNTER — APPOINTMENT (OUTPATIENT)
Dept: HEMATOLOGY ONCOLOGY | Facility: CLINIC | Age: 69
End: 2024-03-14
Payer: MEDICARE

## 2024-03-14 VITALS
TEMPERATURE: 97.7 F | HEART RATE: 96 BPM | SYSTOLIC BLOOD PRESSURE: 128 MMHG | BODY MASS INDEX: 22.51 KG/M2 | OXYGEN SATURATION: 99 % | HEIGHT: 66 IN | DIASTOLIC BLOOD PRESSURE: 90 MMHG | WEIGHT: 140.03 LBS

## 2024-03-14 PROCEDURE — 99215 OFFICE O/P EST HI 40 MIN: CPT

## 2024-03-14 NOTE — PHYSICAL EXAM
[Restricted in physically strenuous activity but ambulatory and able to carry out work of a light or sedentary nature] : Status 1- Restricted in physically strenuous activity but ambulatory and able to carry out work of a light or sedentary nature, e.g., light house work, office work [Normal] : affect appropriate [de-identified] : nephrostomy tube [de-identified] : +ascites

## 2024-03-14 NOTE — ASSESSMENT
[FreeTextEntry1] : Mr Zimmer is a very pleasant 67 year old man with a history of hypertension, DM, ascites, and bilateral PE who was recently found to have carcinomatosis and a left ureteral mass consistent with stage IV urothelial carcinoma   #Urothelial carcinoma: Stage IV disease - final pathology from ureteral mass: infiltrating poorly differentiated carcinoma  - Dr. Mcclain recommended palliative gemcitabine/cisplatin, D1 and D8 Q21 days x 4 cycles then obtain imaging. - will be followed by avelumab maintenance if no disease progression on imaging. - CT to assess response done on 12/8/23:  No evidence of disease.  Previously noted ascites and peritoneal carcinomatosis have resolved. - will request [facility] full genetic interrogation of the tumor to assess for actionable mutations to further guide therapy if needed for POD.  01/18/24: Mr Zimmer returns for follow up. He had a complete response with gem/cis and is now on maintenance avelumab. Tolerating well, feeling well. Sugars are high, will discuss with his PCP.  Will plan for imaging in March and he will follow up after to discuss the results.   03/14/24: Mr Dave returns for follow up. CT from 03/04/24 shows increasing left urothelial mass with moderate left hydroureteronephrosis to level the mass. Marked interval increase in the degree of ascites and peritoneal carcinomatosis as compared to 8/7/2023.  -Given disease progression, I have recommended switching to Enfortumab Vedotin(Padcev) day 1,8,15 in a 28 day cycle.  Will plan for imaging in 3 months or as clinically indicated to assess respoonse.  Return to initiate 2nd line therapy

## 2024-03-14 NOTE — HISTORY OF PRESENT ILLNESS
[de-identified] : Patient presents on C4D19 [place]/Cis for metastatic urothelial carcinoma.  Nephrostomy tube removed ~1 month ago, urinating well, good stream, no hematuria.  + Fatigue, D1, sometimes D2 then resolves.  + Nausea, no emesis, less frequent, not even using antiemetics. No diarrhea or constipation. + Dysgeusia, mostly just D1 then resolves appetite is normal and weight stable. No rashes. No edema. No fevers or chills.   01/18/24: Mr Zimmer returns for follow up. He had a complete response with gem/cis and is now on maintenance avelumab. Tolerating well, feeling well. Sugars are high, will discuss with his PCP.  Will plan for imaging in March and he will follow up after to discuss the results.   03/14/24: Mr Dave returns for follow up. CT from 03/04/24 shows increasing left urothelial mass with moderate left hydroureteronephrosis to level the mass. Marked interval increase in the degree of ascites and peritoneal carcinomatosis as compared to 8/7/2023.  [de-identified] : Mr Zimmer is a very pleasant 67 year old male with a history of HTN, T2DM, and ascites, who was found to have peritoneal carcinomatosis, and Left hydronephrosis s/p Left ureteroscopy and biopsy and a subsequent bilateral pulmonary embolus with pathology consistent with metastatic urothelial carcinoma. He was found to have ascites in [facility] and then presented to [facility] with persistent left flank pain found to have hydronephrosis due to a ureteral lesion as well as omental densities suspicious for underlying malignancy. Pt admitted to [facility] for hydronephrosis. Urology and GI were consulted. Pt underwent nephrostomy tube placement on 8/2 and tolerated procedure well without any complications.  GI was consulted for omental densities founded on imaging.  given possibility for malignancy, IR was consulted, and recommended IR biopsy and/or paracentesis as Outpatient.  Ascites was positive for malignant cells.  CT guided omental biopsy was performed on 08/07/23.  Pathology showed a poorly differentiated carcinoma. IHC was negative for calretinin, GATA3, chromogranin, synaptophysin, SOX10, S100, and D2-40 (podoplanin).  Immunostains for CD3 and CD20 highlights background T and B cells. The overall histology and immunophenotype is consistent with a poorly differentiated carcinoma, primary site undetermined(possible source is  tract and clinical correlation recommended). On 08/24/23 he underwent cystoscopy with left retrograde pyelogram, left ureteroscopy with biopsy of mid left ureteral mass.  That biopsy result is pending. He was readmitted to the hospital after that for shortness of breath and was found to have a bilateral pulmonary embolus and he was started on eliquis. Today he comes for medical oncology consultation. He feels SOB when he lies down flat.  His O2 sat is 98% on RA. He denies any fever, chills, chest pain, SOB, nausea, vomiting, diarrhea, constipation, dysuria, hematuria, hematochezia, or melena.

## 2024-04-10 ENCOUNTER — OUTPATIENT (OUTPATIENT)
Dept: OUTPATIENT SERVICES | Facility: HOSPITAL | Age: 69
LOS: 1 days | Discharge: HOME | End: 2024-04-10

## 2024-04-10 DIAGNOSIS — C78.6 SECONDARY MALIGNANT NEOPLASM OF RETROPERITONEUM AND PERITONEUM: ICD-10-CM

## 2024-04-10 DIAGNOSIS — C68.1: ICD-10-CM

## 2024-04-10 DIAGNOSIS — Z98.890 OTHER SPECIFIED POSTPROCEDURAL STATES: Chronic | ICD-10-CM

## 2024-04-10 DIAGNOSIS — Z90.49 ACQUIRED ABSENCE OF OTHER SPECIFIED PARTS OF DIGESTIVE TRACT: Chronic | ICD-10-CM

## 2024-04-10 DIAGNOSIS — R18.0 MALIGNANT ASCITES: ICD-10-CM

## 2024-04-15 ENCOUNTER — RESULT REVIEW (OUTPATIENT)
Age: 69
End: 2024-04-15

## 2024-04-15 ENCOUNTER — APPOINTMENT (OUTPATIENT)
Dept: HEMATOLOGY ONCOLOGY | Facility: CLINIC | Age: 69
End: 2024-04-15

## 2024-04-15 LAB
BASOPHILS # BLD AUTO: 0 K/UL — SIGNIFICANT CHANGE UP (ref 0–0.2)
BASOPHILS NFR BLD AUTO: 0.9 % — SIGNIFICANT CHANGE UP (ref 0–2)
EOSINOPHIL # BLD AUTO: 0.2 K/UL — SIGNIFICANT CHANGE UP (ref 0–0.5)
EOSINOPHIL NFR BLD AUTO: 2.6 % — SIGNIFICANT CHANGE UP (ref 0–6)
HCT VFR BLD CALC: 30.8 % — LOW (ref 39–50)
HGB BLD-MCNC: 10 G/DL — LOW (ref 13–17)
LYMPHOCYTES # BLD AUTO: 1.4 K/UL — SIGNIFICANT CHANGE UP (ref 1–3.3)
LYMPHOCYTES # BLD AUTO: 24.3 % — SIGNIFICANT CHANGE UP (ref 13–44)
MCHC RBC-ENTMCNC: 27.6 PG — SIGNIFICANT CHANGE UP (ref 27–34)
MCHC RBC-ENTMCNC: 32.6 G/DL — SIGNIFICANT CHANGE UP (ref 32–36)
MCV RBC AUTO: 84.8 FL — SIGNIFICANT CHANGE UP (ref 80–100)
MONOCYTES # BLD AUTO: 0.6 K/UL — SIGNIFICANT CHANGE UP (ref 0–0.9)
MONOCYTES NFR BLD AUTO: 10.8 % — SIGNIFICANT CHANGE UP (ref 2–14)
NEUTROPHILS # BLD AUTO: 3.6 K/UL — SIGNIFICANT CHANGE UP (ref 1.8–7.4)
NEUTROPHILS NFR BLD AUTO: 61.5 % — SIGNIFICANT CHANGE UP (ref 43–77)
PLATELET # BLD AUTO: 309 K/UL — SIGNIFICANT CHANGE UP (ref 150–400)
RBC # BLD: 3.64 M/UL — LOW (ref 4.2–5.8)
RBC # FLD: 14.8 % — HIGH (ref 10.3–14.5)
WBC # BLD: 5.8 K/UL — SIGNIFICANT CHANGE UP (ref 3.8–10.5)
WBC # FLD AUTO: 5.8 K/UL — SIGNIFICANT CHANGE UP (ref 3.8–10.5)

## 2024-04-16 ENCOUNTER — APPOINTMENT (OUTPATIENT)
Age: 69
End: 2024-04-16

## 2024-04-16 DIAGNOSIS — Z51.11 ENCOUNTER FOR ANTINEOPLASTIC CHEMOTHERAPY: ICD-10-CM

## 2024-04-22 ENCOUNTER — RESULT REVIEW (OUTPATIENT)
Age: 69
End: 2024-04-22

## 2024-04-22 ENCOUNTER — APPOINTMENT (OUTPATIENT)
Dept: HEMATOLOGY ONCOLOGY | Facility: CLINIC | Age: 69
End: 2024-04-22

## 2024-04-22 LAB
BASOPHILS # BLD AUTO: 0 K/UL — SIGNIFICANT CHANGE UP (ref 0–0.2)
BASOPHILS NFR BLD AUTO: 0.7 % — SIGNIFICANT CHANGE UP (ref 0–2)
EOSINOPHIL # BLD AUTO: 0.1 K/UL — SIGNIFICANT CHANGE UP (ref 0–0.5)
EOSINOPHIL NFR BLD AUTO: 2.4 % — SIGNIFICANT CHANGE UP (ref 0–6)
HCT VFR BLD CALC: 33.7 % — LOW (ref 39–50)
HGB BLD-MCNC: 11.1 G/DL — LOW (ref 13–17)
LYMPHOCYTES # BLD AUTO: 1.5 K/UL — SIGNIFICANT CHANGE UP (ref 1–3.3)
LYMPHOCYTES # BLD AUTO: 28.7 % — SIGNIFICANT CHANGE UP (ref 13–44)
MCHC RBC-ENTMCNC: 27.7 PG — SIGNIFICANT CHANGE UP (ref 27–34)
MCHC RBC-ENTMCNC: 32.9 G/DL — SIGNIFICANT CHANGE UP (ref 32–36)
MCV RBC AUTO: 84.2 FL — SIGNIFICANT CHANGE UP (ref 80–100)
MONOCYTES # BLD AUTO: 0.5 K/UL — SIGNIFICANT CHANGE UP (ref 0–0.9)
MONOCYTES NFR BLD AUTO: 8.8 % — SIGNIFICANT CHANGE UP (ref 2–14)
NEUTROPHILS # BLD AUTO: 3.1 K/UL — SIGNIFICANT CHANGE UP (ref 1.8–7.4)
NEUTROPHILS NFR BLD AUTO: 59.4 % — SIGNIFICANT CHANGE UP (ref 43–77)
PLATELET # BLD AUTO: 299 K/UL — SIGNIFICANT CHANGE UP (ref 150–400)
RBC # BLD: 4 M/UL — LOW (ref 4.2–5.8)
RBC # FLD: 15.2 % — HIGH (ref 10.3–14.5)
WBC # BLD: 5.3 K/UL — SIGNIFICANT CHANGE UP (ref 3.8–10.5)
WBC # FLD AUTO: 5.3 K/UL — SIGNIFICANT CHANGE UP (ref 3.8–10.5)

## 2024-04-23 ENCOUNTER — APPOINTMENT (OUTPATIENT)
Dept: HEMATOLOGY ONCOLOGY | Facility: CLINIC | Age: 69
End: 2024-04-23
Payer: MEDICARE

## 2024-04-23 ENCOUNTER — APPOINTMENT (OUTPATIENT)
Age: 69
End: 2024-04-23

## 2024-04-23 DIAGNOSIS — C68.9 MALIGNANT NEOPLASM OF URINARY ORGAN, UNSPECIFIED: ICD-10-CM

## 2024-04-23 PROCEDURE — 99214 OFFICE O/P EST MOD 30 MIN: CPT

## 2024-04-23 NOTE — HISTORY OF PRESENT ILLNESS
[de-identified] : Mr Zimmer is a very pleasant 67 year old male with a history of HTN, T2DM, and ascites, who was found to have peritoneal carcinomatosis, and Left hydronephrosis s/p Left ureteroscopy and biopsy and a subsequent bilateral pulmonary embolus with pathology consistent with metastatic urothelial carcinoma. He was found to have ascites in [facility] and then presented to [facility] with persistent left flank pain found to have hydronephrosis due to a ureteral lesion as well as omental densities suspicious for underlying malignancy. Pt admitted to [facility] for hydronephrosis. Urology and GI were consulted. Pt underwent nephrostomy tube placement on 8/2 and tolerated procedure well without any complications.  GI was consulted for omental densities founded on imaging.  given possibility for malignancy, IR was consulted, and recommended IR biopsy and/or paracentesis as Outpatient.  Ascites was positive for malignant cells.  CT guided omental biopsy was performed on 08/07/23.  Pathology showed a poorly differentiated carcinoma. IHC was negative for calretinin, GATA3, chromogranin, synaptophysin, SOX10, S100, and D2-40 (podoplanin).  Immunostains for CD3 and CD20 highlights background T and B cells. The overall histology and immunophenotype is consistent with a poorly differentiated carcinoma, primary site undetermined(possible source is  tract and clinical correlation recommended). On 08/24/23 he underwent cystoscopy with left retrograde pyelogram, left ureteroscopy with biopsy of mid left ureteral mass.  That biopsy result is pending. He was readmitted to the hospital after that for shortness of breath and was found to have a bilateral pulmonary embolus and he was started on eliquis. Today he comes for medical oncology consultation. He feels SOB when he lies down flat.  His O2 sat is 98% on RA. He denies any fever, chills, chest pain, SOB, nausea, vomiting, diarrhea, constipation, dysuria, hematuria, hematochezia, or melena.  [de-identified] : Patient presents on C1D8 of Padcev for metastatic urothelial carcinoma.  He is s/p 4 cycles of maintenance [place]/Cisplatin then 4 cycles of Avelumab changed for POD.  + Mild, non-pruritic rash, LUE and anterior chest. + Dysgeusia with some foods. Otherwise tolerating treatment well so far.  Denies fatigue, alopecia, H/F syndrome N/V/D/C, decreased appetite, neuropathy, fevers, chills or night sweats.

## 2024-04-23 NOTE — ASSESSMENT
[FreeTextEntry1] : Mr Zimmer is a very pleasant 67 year old man with a history of hypertension, DM, ascites, and bilateral PE who was recently found to have carcinomatosis and a left ureteral mass consistent with stage IV urothelial carcinoma   #Urothelial carcinoma: Stage IV disease - final pathology from ureteral mass: infiltrating poorly differentiated carcinoma  - Dr. Mcclain recommended palliative gemcitabine/cisplatin, D1 and D8 Q21 days x 4 cycles then obtain imaging. - then followed by avelumab maintenance as no disease progression on imaging. - CT to assess response done on 12/8/23:  No evidence of disease.  Previously noted ascites and peritoneal carcinomatosis have resolved. - requested [facility] full genetic interrogation of the tumor to assess for actionable mutations to further guide therapy if needed for POD.  -CT from 3/4/24 shows increasing left urothelial mass with moderate left hydroureteronephrosis to level the mass. Marked interval increase in the degree of ascites and peritoneal carcinomatosis as compared to 8/7/2023.  -Given disease progression treatment switched to Enfortumab Vedotin(Padcev) day 1,8,15 in a 28 day cycle.   -today is C1D8, tolerated well so far.  -will plan for imaging in 3 months or as clinically indicated to assess response.   -RTC D1, 8 and 15 Q4 weeks for treatment with MD/PA follow up Q4 weeks

## 2024-04-23 NOTE — PHYSICAL EXAM
[Restricted in physically strenuous activity but ambulatory and able to carry out work of a light or sedentary nature] : Status 1- Restricted in physically strenuous activity but ambulatory and able to carry out work of a light or sedentary nature, e.g., light house work, office work [Normal] : affect appropriate [de-identified] : +ascites [de-identified] : nephrostomy tube

## 2024-04-24 DIAGNOSIS — R11.2 NAUSEA WITH VOMITING, UNSPECIFIED: ICD-10-CM

## 2024-04-24 LAB
ALBUMIN SERPL ELPH-MCNC: 3.7 G/DL
ALP BLD-CCNC: 144 U/L
ALT SERPL-CCNC: 11 U/L
ANION GAP SERPL CALC-SCNC: 11 MMOL/L
AST SERPL-CCNC: 20 U/L
BILIRUB SERPL-MCNC: 0.6 MG/DL
BUN SERPL-MCNC: 15 MG/DL
CALCIUM SERPL-MCNC: 9.5 MG/DL
CHLORIDE SERPL-SCNC: 102 MMOL/L
CO2 SERPL-SCNC: 25 MMOL/L
CREAT SERPL-MCNC: 1 MG/DL
EGFR: 83 ML/MIN/1.73M2
GLUCOSE SERPL-MCNC: 140 MG/DL
MAGNESIUM SERPL-MCNC: 1.6 MG/DL
POTASSIUM SERPL-SCNC: 4.2 MMOL/L
PROT SERPL-MCNC: 7.4 G/DL
SODIUM SERPL-SCNC: 138 MMOL/L

## 2024-04-29 ENCOUNTER — RESULT REVIEW (OUTPATIENT)
Age: 69
End: 2024-04-29

## 2024-04-29 ENCOUNTER — APPOINTMENT (OUTPATIENT)
Dept: HEMATOLOGY ONCOLOGY | Facility: CLINIC | Age: 69
End: 2024-04-29

## 2024-04-29 LAB
BASOPHILS # BLD AUTO: 0 K/UL — SIGNIFICANT CHANGE UP (ref 0–0.2)
BASOPHILS NFR BLD AUTO: 0.3 % — SIGNIFICANT CHANGE UP (ref 0–2)
EOSINOPHIL # BLD AUTO: 0.2 K/UL — SIGNIFICANT CHANGE UP (ref 0–0.5)
EOSINOPHIL NFR BLD AUTO: 3.3 % — SIGNIFICANT CHANGE UP (ref 0–6)
HCT VFR BLD CALC: 31.1 % — LOW (ref 39–50)
HGB BLD-MCNC: 10.2 G/DL — LOW (ref 13–17)
LYMPHOCYTES # BLD AUTO: 1.4 K/UL — SIGNIFICANT CHANGE UP (ref 1–3.3)
LYMPHOCYTES # BLD AUTO: 27.4 % — SIGNIFICANT CHANGE UP (ref 13–44)
MCHC RBC-ENTMCNC: 27.9 PG — SIGNIFICANT CHANGE UP (ref 27–34)
MCHC RBC-ENTMCNC: 32.7 G/DL — SIGNIFICANT CHANGE UP (ref 32–36)
MCV RBC AUTO: 85.1 FL — SIGNIFICANT CHANGE UP (ref 80–100)
MONOCYTES # BLD AUTO: 0.4 K/UL — SIGNIFICANT CHANGE UP (ref 0–0.9)
MONOCYTES NFR BLD AUTO: 8 % — SIGNIFICANT CHANGE UP (ref 2–14)
NEUTROPHILS # BLD AUTO: 3.2 K/UL — SIGNIFICANT CHANGE UP (ref 1.8–7.4)
NEUTROPHILS NFR BLD AUTO: 60.9 % — SIGNIFICANT CHANGE UP (ref 43–77)
PLATELET # BLD AUTO: 327 K/UL — SIGNIFICANT CHANGE UP (ref 150–400)
RBC # BLD: 3.65 M/UL — LOW (ref 4.2–5.8)
RBC # FLD: 15.4 % — HIGH (ref 10.3–14.5)
WBC # BLD: 5.2 K/UL — SIGNIFICANT CHANGE UP (ref 3.8–10.5)
WBC # FLD AUTO: 5.2 K/UL — SIGNIFICANT CHANGE UP (ref 3.8–10.5)

## 2024-04-30 ENCOUNTER — APPOINTMENT (OUTPATIENT)
Age: 69
End: 2024-04-30

## 2024-04-30 DIAGNOSIS — E83.42 HYPOMAGNESEMIA: ICD-10-CM

## 2024-04-30 RX ORDER — CHLORHEXIDINE GLUCONATE 4 %
400 (240 MG) LIQUID (ML) TOPICAL
Qty: 52 | Refills: 1 | Status: ACTIVE | COMMUNITY
Start: 2023-11-06 | End: 1900-01-01

## 2024-05-06 ENCOUNTER — APPOINTMENT (OUTPATIENT)
Dept: HEMATOLOGY ONCOLOGY | Facility: CLINIC | Age: 69
End: 2024-05-06

## 2024-05-07 ENCOUNTER — APPOINTMENT (OUTPATIENT)
Age: 69
End: 2024-05-07

## 2024-05-13 ENCOUNTER — RESULT REVIEW (OUTPATIENT)
Age: 69
End: 2024-05-13

## 2024-05-13 ENCOUNTER — APPOINTMENT (OUTPATIENT)
Dept: HEMATOLOGY ONCOLOGY | Facility: CLINIC | Age: 69
End: 2024-05-13

## 2024-05-13 LAB
BASOPHILS # BLD AUTO: 0.1 K/UL — SIGNIFICANT CHANGE UP (ref 0–0.2)
BASOPHILS NFR BLD AUTO: 1.9 % — SIGNIFICANT CHANGE UP (ref 0–2)
EOSINOPHIL # BLD AUTO: 0.6 K/UL — HIGH (ref 0–0.5)
EOSINOPHIL NFR BLD AUTO: 13.4 % — HIGH (ref 0–6)
HCT VFR BLD CALC: 30.7 % — LOW (ref 39–50)
HGB BLD-MCNC: 10 G/DL — LOW (ref 13–17)
LYMPHOCYTES # BLD AUTO: 1.4 K/UL — SIGNIFICANT CHANGE UP (ref 1–3.3)
LYMPHOCYTES # BLD AUTO: 34.3 % — SIGNIFICANT CHANGE UP (ref 13–44)
MCHC RBC-ENTMCNC: 27.9 PG — SIGNIFICANT CHANGE UP (ref 27–34)
MCHC RBC-ENTMCNC: 32.7 G/DL — SIGNIFICANT CHANGE UP (ref 32–36)
MCV RBC AUTO: 85.3 FL — SIGNIFICANT CHANGE UP (ref 80–100)
MONOCYTES # BLD AUTO: 0.6 K/UL — SIGNIFICANT CHANGE UP (ref 0–0.9)
MONOCYTES NFR BLD AUTO: 14.4 % — HIGH (ref 2–14)
NEUTROPHILS # BLD AUTO: 1.5 K/UL — LOW (ref 1.8–7.4)
NEUTROPHILS NFR BLD AUTO: 36 % — LOW (ref 43–77)
PLATELET # BLD AUTO: 293 K/UL — SIGNIFICANT CHANGE UP (ref 150–400)
RBC # BLD: 3.6 M/UL — LOW (ref 4.2–5.8)
RBC # FLD: 15.6 % — HIGH (ref 10.3–14.5)
WBC # BLD: 4.2 K/UL — SIGNIFICANT CHANGE UP (ref 3.8–10.5)
WBC # FLD AUTO: 4.2 K/UL — SIGNIFICANT CHANGE UP (ref 3.8–10.5)

## 2024-05-14 ENCOUNTER — APPOINTMENT (OUTPATIENT)
Age: 69
End: 2024-05-14

## 2024-05-19 LAB
ALBUMIN SERPL ELPH-MCNC: 3.5 G/DL
ALBUMIN SERPL ELPH-MCNC: 3.7 G/DL
ALBUMIN SERPL ELPH-MCNC: 4.2 G/DL
ALP BLD-CCNC: 111 U/L
ALP BLD-CCNC: 115 U/L
ALP BLD-CCNC: 145 U/L
ALT SERPL-CCNC: 11 U/L
ALT SERPL-CCNC: 17 U/L
ALT SERPL-CCNC: 8 U/L
ANION GAP SERPL CALC-SCNC: 12 MMOL/L
ANION GAP SERPL CALC-SCNC: 13 MMOL/L
ANION GAP SERPL CALC-SCNC: 14 MMOL/L
AST SERPL-CCNC: 13 U/L
AST SERPL-CCNC: 13 U/L
AST SERPL-CCNC: 23 U/L
BILIRUB SERPL-MCNC: 0.3 MG/DL
BILIRUB SERPL-MCNC: 0.4 MG/DL
BILIRUB SERPL-MCNC: 0.5 MG/DL
BUN SERPL-MCNC: 11 MG/DL
BUN SERPL-MCNC: 11 MG/DL
BUN SERPL-MCNC: 14 MG/DL
CALCIUM SERPL-MCNC: 9.1 MG/DL
CALCIUM SERPL-MCNC: 9.5 MG/DL
CALCIUM SERPL-MCNC: 9.5 MG/DL
CHLORIDE SERPL-SCNC: 101 MMOL/L
CHLORIDE SERPL-SCNC: 102 MMOL/L
CHLORIDE SERPL-SCNC: 103 MMOL/L
CO2 SERPL-SCNC: 22 MMOL/L
CO2 SERPL-SCNC: 22 MMOL/L
CO2 SERPL-SCNC: 24 MMOL/L
CREAT SERPL-MCNC: 0.99 MG/DL
CREAT SERPL-MCNC: 1.05 MG/DL
CREAT SERPL-MCNC: 1.16 MG/DL
EGFR: 69 ML/MIN/1.73M2
EGFR: 78 ML/MIN/1.73M2
EGFR: 84 ML/MIN/1.73M2
GLUCOSE SERPL-MCNC: 137 MG/DL
GLUCOSE SERPL-MCNC: 152 MG/DL
GLUCOSE SERPL-MCNC: 172 MG/DL
MAGNESIUM SERPL-MCNC: 1.4 MG/DL
MAGNESIUM SERPL-MCNC: 1.8 MG/DL
MAGNESIUM SERPL-MCNC: 1.8 MG/DL
POTASSIUM SERPL-SCNC: 3.9 MMOL/L
POTASSIUM SERPL-SCNC: 4 MMOL/L
POTASSIUM SERPL-SCNC: 4.2 MMOL/L
PROT SERPL-MCNC: 7.2 G/DL
PROT SERPL-MCNC: 7.4 G/DL
PROT SERPL-MCNC: 8.1 G/DL
SODIUM SERPL-SCNC: 137 MMOL/L
SODIUM SERPL-SCNC: 137 MMOL/L
SODIUM SERPL-SCNC: 139 MMOL/L
T4 SERPL-MCNC: 9.9 UG/DL
TSH SERPL-ACNC: 3.93 UIU/ML

## 2024-05-20 ENCOUNTER — RESULT REVIEW (OUTPATIENT)
Age: 69
End: 2024-05-20

## 2024-05-20 ENCOUNTER — APPOINTMENT (OUTPATIENT)
Dept: HEMATOLOGY ONCOLOGY | Facility: CLINIC | Age: 69
End: 2024-05-20

## 2024-05-20 LAB
BASOPHILS # BLD AUTO: 0.1 K/UL — SIGNIFICANT CHANGE UP (ref 0–0.2)
BASOPHILS NFR BLD AUTO: 0.9 % — SIGNIFICANT CHANGE UP (ref 0–2)
EOSINOPHIL # BLD AUTO: 0.2 K/UL — SIGNIFICANT CHANGE UP (ref 0–0.5)
EOSINOPHIL NFR BLD AUTO: 3.9 % — SIGNIFICANT CHANGE UP (ref 0–6)
HCT VFR BLD CALC: 29.7 % — LOW (ref 39–50)
HGB BLD-MCNC: 10 G/DL — LOW (ref 13–17)
LYMPHOCYTES # BLD AUTO: 1.3 K/UL — SIGNIFICANT CHANGE UP (ref 1–3.3)
LYMPHOCYTES # BLD AUTO: 21 % — SIGNIFICANT CHANGE UP (ref 13–44)
MCHC RBC-ENTMCNC: 28 PG — SIGNIFICANT CHANGE UP (ref 27–34)
MCHC RBC-ENTMCNC: 33.6 G/DL — SIGNIFICANT CHANGE UP (ref 32–36)
MCV RBC AUTO: 83.3 FL — SIGNIFICANT CHANGE UP (ref 80–100)
MONOCYTES # BLD AUTO: 0.8 K/UL — SIGNIFICANT CHANGE UP (ref 0–0.9)
MONOCYTES NFR BLD AUTO: 12.4 % — SIGNIFICANT CHANGE UP (ref 2–14)
NEUTROPHILS # BLD AUTO: 3.8 K/UL — SIGNIFICANT CHANGE UP (ref 1.8–7.4)
NEUTROPHILS NFR BLD AUTO: 61.8 % — SIGNIFICANT CHANGE UP (ref 43–77)
PLATELET # BLD AUTO: 282 K/UL — SIGNIFICANT CHANGE UP (ref 150–400)
RBC # BLD: 3.57 M/UL — LOW (ref 4.2–5.8)
RBC # FLD: 14.6 % — HIGH (ref 10.3–14.5)
WBC # BLD: 6.2 K/UL — SIGNIFICANT CHANGE UP (ref 3.8–10.5)
WBC # FLD AUTO: 6.2 K/UL — SIGNIFICANT CHANGE UP (ref 3.8–10.5)

## 2024-05-21 ENCOUNTER — RESULT REVIEW (OUTPATIENT)
Age: 69
End: 2024-05-21

## 2024-05-21 ENCOUNTER — APPOINTMENT (OUTPATIENT)
Dept: HEMATOLOGY ONCOLOGY | Facility: CLINIC | Age: 69
End: 2024-05-21

## 2024-05-21 ENCOUNTER — APPOINTMENT (OUTPATIENT)
Age: 69
End: 2024-05-21

## 2024-05-22 LAB
ALBUMIN SERPL ELPH-MCNC: 3.8 G/DL — SIGNIFICANT CHANGE UP (ref 3.3–5)
ALP SERPL-CCNC: 94 U/L — SIGNIFICANT CHANGE UP (ref 40–120)
ALT FLD-CCNC: 8 U/L — LOW (ref 10–45)
ANION GAP SERPL CALC-SCNC: 12 MMOL/L — SIGNIFICANT CHANGE UP (ref 5–17)
AST SERPL-CCNC: 13 U/L — SIGNIFICANT CHANGE UP (ref 10–40)
BILIRUB SERPL-MCNC: 0.3 MG/DL — SIGNIFICANT CHANGE UP (ref 0.2–1.2)
BUN SERPL-MCNC: 14 MG/DL — SIGNIFICANT CHANGE UP (ref 7–23)
CALCIUM SERPL-MCNC: 10 MG/DL — SIGNIFICANT CHANGE UP (ref 8.4–10.5)
CHLORIDE SERPL-SCNC: 100 MMOL/L — SIGNIFICANT CHANGE UP (ref 96–108)
CO2 SERPL-SCNC: 22 MMOL/L — SIGNIFICANT CHANGE UP (ref 22–31)
CREAT SERPL-MCNC: 0.82 MG/DL — SIGNIFICANT CHANGE UP (ref 0.5–1.3)
EGFR: 97 ML/MIN/1.73M2 — SIGNIFICANT CHANGE UP
GLUCOSE SERPL-MCNC: 173 MG/DL — HIGH (ref 70–99)
MAGNESIUM SERPL-MCNC: 1.5 MG/DL — LOW (ref 1.6–2.6)
POTASSIUM SERPL-MCNC: 3.7 MMOL/L — SIGNIFICANT CHANGE UP (ref 3.5–5.3)
POTASSIUM SERPL-SCNC: 3.7 MMOL/L — SIGNIFICANT CHANGE UP (ref 3.5–5.3)
PROT SERPL-MCNC: 8.1 G/DL — SIGNIFICANT CHANGE UP (ref 6–8.3)
SODIUM SERPL-SCNC: 135 MMOL/L — SIGNIFICANT CHANGE UP (ref 135–145)

## 2024-05-28 ENCOUNTER — APPOINTMENT (OUTPATIENT)
Dept: HEMATOLOGY ONCOLOGY | Facility: CLINIC | Age: 69
End: 2024-05-28

## 2024-05-28 ENCOUNTER — RESULT REVIEW (OUTPATIENT)
Age: 69
End: 2024-05-28

## 2024-05-28 LAB
BASOPHILS # BLD AUTO: 0 K/UL — SIGNIFICANT CHANGE UP (ref 0–0.2)
BASOPHILS NFR BLD AUTO: 0.7 % — SIGNIFICANT CHANGE UP (ref 0–2)
EOSINOPHIL # BLD AUTO: 0.8 K/UL — HIGH (ref 0–0.5)
EOSINOPHIL NFR BLD AUTO: 12.6 % — HIGH (ref 0–6)
HCT VFR BLD CALC: 31.4 % — LOW (ref 39–50)
HGB BLD-MCNC: 10.6 G/DL — LOW (ref 13–17)
LYMPHOCYTES # BLD AUTO: 1.4 K/UL — SIGNIFICANT CHANGE UP (ref 1–3.3)
LYMPHOCYTES # BLD AUTO: 21.5 % — SIGNIFICANT CHANGE UP (ref 13–44)
MCHC RBC-ENTMCNC: 28.7 PG — SIGNIFICANT CHANGE UP (ref 27–34)
MCHC RBC-ENTMCNC: 33.7 G/DL — SIGNIFICANT CHANGE UP (ref 32–36)
MCV RBC AUTO: 85.2 FL — SIGNIFICANT CHANGE UP (ref 80–100)
MONOCYTES # BLD AUTO: 0.4 K/UL — SIGNIFICANT CHANGE UP (ref 0–0.9)
MONOCYTES NFR BLD AUTO: 7.1 % — SIGNIFICANT CHANGE UP (ref 2–14)
NEUTROPHILS # BLD AUTO: 3.7 K/UL — SIGNIFICANT CHANGE UP (ref 1.8–7.4)
NEUTROPHILS NFR BLD AUTO: 58.2 % — SIGNIFICANT CHANGE UP (ref 43–77)
PLATELET # BLD AUTO: 300 K/UL — SIGNIFICANT CHANGE UP (ref 150–400)
RBC # BLD: 3.69 M/UL — LOW (ref 4.2–5.8)
RBC # FLD: 15.8 % — HIGH (ref 10.3–14.5)
WBC # BLD: 6.3 K/UL — SIGNIFICANT CHANGE UP (ref 3.8–10.5)
WBC # FLD AUTO: 6.3 K/UL — SIGNIFICANT CHANGE UP (ref 3.8–10.5)

## 2024-05-29 ENCOUNTER — APPOINTMENT (OUTPATIENT)
Age: 69
End: 2024-05-29

## 2024-05-29 LAB
ALBUMIN SERPL ELPH-MCNC: 3.8 G/DL
ALP BLD-CCNC: 100 U/L
ALT SERPL-CCNC: 13 U/L
ANION GAP SERPL CALC-SCNC: 14 MMOL/L
AST SERPL-CCNC: 16 U/L
BILIRUB SERPL-MCNC: 0.4 MG/DL
BUN SERPL-MCNC: 13 MG/DL
CALCIUM SERPL-MCNC: 9.7 MG/DL
CHLORIDE SERPL-SCNC: 102 MMOL/L
CO2 SERPL-SCNC: 22 MMOL/L
CREAT SERPL-MCNC: 0.86 MG/DL
EGFR: 96 ML/MIN/1.73M2
GLUCOSE SERPL-MCNC: 172 MG/DL
MAGNESIUM SERPL-MCNC: 1.5 MG/DL
POTASSIUM SERPL-SCNC: 4.3 MMOL/L
PROT SERPL-MCNC: 7.4 G/DL
SODIUM SERPL-SCNC: 137 MMOL/L

## 2024-06-10 ENCOUNTER — RESULT REVIEW (OUTPATIENT)
Age: 69
End: 2024-06-10

## 2024-06-10 ENCOUNTER — APPOINTMENT (OUTPATIENT)
Dept: HEMATOLOGY ONCOLOGY | Facility: CLINIC | Age: 69
End: 2024-06-10

## 2024-06-11 ENCOUNTER — APPOINTMENT (OUTPATIENT)
Age: 69
End: 2024-06-11

## 2024-06-12 LAB
ALBUMIN SERPL ELPH-MCNC: 4.2 G/DL
ALP BLD-CCNC: 98 U/L
ALT SERPL-CCNC: 14 U/L
ANION GAP SERPL CALC-SCNC: 13 MMOL/L
AST SERPL-CCNC: 15 U/L
BILIRUB SERPL-MCNC: 0.3 MG/DL
BUN SERPL-MCNC: 16 MG/DL
CALCIUM SERPL-MCNC: 9.6 MG/DL
CHLORIDE SERPL-SCNC: 102 MMOL/L
CO2 SERPL-SCNC: 22 MMOL/L
CREAT SERPL-MCNC: 0.96 MG/DL
EGFR: 87 ML/MIN/1.73M2
GLUCOSE SERPL-MCNC: 148 MG/DL
MAGNESIUM SERPL-MCNC: 1.8 MG/DL
POTASSIUM SERPL-SCNC: 4.1 MMOL/L
PROT SERPL-MCNC: 7.8 G/DL
SODIUM SERPL-SCNC: 137 MMOL/L

## 2024-06-17 ENCOUNTER — APPOINTMENT (OUTPATIENT)
Dept: HEMATOLOGY ONCOLOGY | Facility: CLINIC | Age: 69
End: 2024-06-17

## 2024-06-18 ENCOUNTER — APPOINTMENT (OUTPATIENT)
Age: 69
End: 2024-06-18

## 2024-06-24 ENCOUNTER — APPOINTMENT (OUTPATIENT)
Dept: HEMATOLOGY ONCOLOGY | Facility: CLINIC | Age: 69
End: 2024-06-24

## 2024-06-24 ENCOUNTER — RESULT REVIEW (OUTPATIENT)
Age: 69
End: 2024-06-24

## 2024-06-25 ENCOUNTER — APPOINTMENT (OUTPATIENT)
Dept: HEMATOLOGY ONCOLOGY | Facility: CLINIC | Age: 69
End: 2024-06-25

## 2024-06-25 ENCOUNTER — APPOINTMENT (OUTPATIENT)
Age: 69
End: 2024-06-25

## 2024-06-27 LAB
ALBUMIN SERPL ELPH-MCNC: 3.8 G/DL
ALP BLD-CCNC: 111 U/L
ALT SERPL-CCNC: 11 U/L
ANION GAP SERPL CALC-SCNC: 11 MMOL/L
AST SERPL-CCNC: 13 U/L
BILIRUB SERPL-MCNC: 0.3 MG/DL
BUN SERPL-MCNC: 12 MG/DL
CALCIUM SERPL-MCNC: 9.3 MG/DL
CHLORIDE SERPL-SCNC: 105 MMOL/L
CO2 SERPL-SCNC: 24 MMOL/L
CREAT SERPL-MCNC: 1.12 MG/DL
EGFR: 72 ML/MIN/1.73M2
GLUCOSE SERPL-MCNC: 147 MG/DL
MAGNESIUM SERPL-MCNC: 2 MG/DL
POTASSIUM SERPL-SCNC: 4.1 MMOL/L
PROT SERPL-MCNC: 6.8 G/DL
SODIUM SERPL-SCNC: 140 MMOL/L

## 2024-07-03 ENCOUNTER — APPOINTMENT (OUTPATIENT)
Dept: HEMATOLOGY ONCOLOGY | Facility: CLINIC | Age: 69
End: 2024-07-03
Payer: MEDICARE

## 2024-07-03 VITALS
OXYGEN SATURATION: 98 % | WEIGHT: 143.44 LBS | DIASTOLIC BLOOD PRESSURE: 96 MMHG | BODY MASS INDEX: 23.05 KG/M2 | HEIGHT: 66 IN | SYSTOLIC BLOOD PRESSURE: 161 MMHG | RESPIRATION RATE: 17 BRPM | TEMPERATURE: 97.9 F | HEART RATE: 81 BPM

## 2024-07-03 PROCEDURE — 99215 OFFICE O/P EST HI 40 MIN: CPT

## 2024-08-16 NOTE — DISCHARGE NOTE NURSING/CASE MANAGEMENT/SOCIAL WORK - FLU SEASON?
Pt is here for   Chief Complaint   Patient presents with    Annual Exam     With labs     Referral - General     To [facility].. Dr. Lore Almeida    Shoulder Pain     Right shoulder pain level is 6/10, unable to lift over head or take bra off with out a lot of pain      \"Have you been to the ER, urgent care clinic since your last visit?  Hospitalized since your last visit?\"    NO    “Have you seen or consulted any other health care providers outside of [facility] since your last visit?”    NO     “Have you had a pap smear?”    NO    Date of last Cervical Cancer screen (HPV or PAP): 4/10/2019             Click Here for Release of Records Request     No

## 2024-08-27 ENCOUNTER — OUTPATIENT (OUTPATIENT)
Dept: OUTPATIENT SERVICES | Facility: HOSPITAL | Age: 69
LOS: 1 days | Discharge: HOME | End: 2024-08-27

## 2024-08-27 ENCOUNTER — OUTPATIENT (OUTPATIENT)
Dept: OUTPATIENT SERVICES | Facility: HOSPITAL | Age: 69
LOS: 1 days | End: 2024-08-27

## 2024-08-27 ENCOUNTER — APPOINTMENT (OUTPATIENT)
Dept: CT IMAGING | Facility: CLINIC | Age: 69
End: 2024-08-27

## 2024-08-27 DIAGNOSIS — C68.1: ICD-10-CM

## 2024-08-27 DIAGNOSIS — Z90.49 ACQUIRED ABSENCE OF OTHER SPECIFIED PARTS OF DIGESTIVE TRACT: Chronic | ICD-10-CM

## 2024-08-27 DIAGNOSIS — Z98.890 OTHER SPECIFIED POSTPROCEDURAL STATES: Chronic | ICD-10-CM

## 2024-08-27 DIAGNOSIS — C88.9: ICD-10-CM

## 2024-08-27 PROCEDURE — 71260 CT THORAX DX C+: CPT | Mod: 26

## 2024-08-27 PROCEDURE — 74177 CT ABD & PELVIS W/CONTRAST: CPT | Mod: 26

## 2024-09-03 NOTE — ED PROVIDER NOTE - PROGRESS NOTE ADDITIONAL2
[Time Spent: ___ minutes] : I have spent [unfilled] minutes of time on the encounter which excludes teaching and separately reported services.
Additional Progress Note...

## 2024-09-05 ENCOUNTER — RESULT REVIEW (OUTPATIENT)
Age: 69
End: 2024-09-05

## 2024-09-05 ENCOUNTER — APPOINTMENT (OUTPATIENT)
Dept: HEMATOLOGY ONCOLOGY | Facility: CLINIC | Age: 69
End: 2024-09-05
Payer: MEDICARE

## 2024-09-05 VITALS
SYSTOLIC BLOOD PRESSURE: 156 MMHG | HEIGHT: 66 IN | WEIGHT: 155.31 LBS | TEMPERATURE: 97.2 F | OXYGEN SATURATION: 99 % | BODY MASS INDEX: 24.96 KG/M2 | HEART RATE: 73 BPM | DIASTOLIC BLOOD PRESSURE: 100 MMHG

## 2024-09-05 LAB
ALBUMIN SERPL ELPH-MCNC: 4.4 G/DL
ALP BLD-CCNC: 160 U/L
ALT SERPL-CCNC: 15 U/L
ANION GAP SERPL CALC-SCNC: 12 MMOL/L
AST SERPL-CCNC: 16 U/L
BASOPHILS # BLD AUTO: 0.1 K/UL — SIGNIFICANT CHANGE UP (ref 0–0.2)
BASOPHILS NFR BLD AUTO: 0.8 % — SIGNIFICANT CHANGE UP (ref 0–2)
BILIRUB SERPL-MCNC: 0.5 MG/DL
BUN SERPL-MCNC: 22 MG/DL
CALCIUM SERPL-MCNC: 9.9 MG/DL
CHLORIDE SERPL-SCNC: 102 MMOL/L
CO2 SERPL-SCNC: 24 MMOL/L
CREAT SERPL-MCNC: 1.12 MG/DL
EGFR: 72 ML/MIN/1.73M2
EOSINOPHIL # BLD AUTO: 0.1 K/UL — SIGNIFICANT CHANGE UP (ref 0–0.5)
EOSINOPHIL NFR BLD AUTO: 1.5 % — SIGNIFICANT CHANGE UP (ref 0–6)
GLUCOSE SERPL-MCNC: 258 MG/DL
HCT VFR BLD CALC: 44.4 % — SIGNIFICANT CHANGE UP (ref 39–50)
HGB BLD-MCNC: 14.2 G/DL — SIGNIFICANT CHANGE UP (ref 13–17)
LYMPHOCYTES # BLD AUTO: 1.4 K/UL — SIGNIFICANT CHANGE UP (ref 1–3.3)
LYMPHOCYTES # BLD AUTO: 19.2 % — SIGNIFICANT CHANGE UP (ref 13–44)
MAGNESIUM SERPL-MCNC: 1.9 MG/DL
MCHC RBC-ENTMCNC: 29.1 PG — SIGNIFICANT CHANGE UP (ref 27–34)
MCHC RBC-ENTMCNC: 32.1 G/DL — SIGNIFICANT CHANGE UP (ref 32–36)
MCV RBC AUTO: 90.6 FL — SIGNIFICANT CHANGE UP (ref 80–100)
MONOCYTES # BLD AUTO: 0.5 K/UL — SIGNIFICANT CHANGE UP (ref 0–0.9)
MONOCYTES NFR BLD AUTO: 6.8 % — SIGNIFICANT CHANGE UP (ref 2–14)
NEUTROPHILS # BLD AUTO: 5.4 K/UL — SIGNIFICANT CHANGE UP (ref 1.8–7.4)
NEUTROPHILS NFR BLD AUTO: 71.7 % — SIGNIFICANT CHANGE UP (ref 43–77)
PLATELET # BLD AUTO: 229 K/UL — SIGNIFICANT CHANGE UP (ref 150–400)
POTASSIUM SERPL-SCNC: 4.5 MMOL/L
PROT SERPL-MCNC: 7.4 G/DL
RBC # BLD: 4.89 M/UL — SIGNIFICANT CHANGE UP (ref 4.2–5.8)
RBC # FLD: 13.2 % — SIGNIFICANT CHANGE UP (ref 10.3–14.5)
SODIUM SERPL-SCNC: 138 MMOL/L
WBC # BLD: 7.6 K/UL — SIGNIFICANT CHANGE UP (ref 3.8–10.5)
WBC # FLD AUTO: 7.6 K/UL — SIGNIFICANT CHANGE UP (ref 3.8–10.5)

## 2024-09-05 PROCEDURE — 99215 OFFICE O/P EST HI 40 MIN: CPT

## 2024-09-05 NOTE — REASON FOR VISIT
[Follow-Up Visit] : a follow-up [Family Member] : family member [FreeTextEntry2] : stage IV urothelial carcinoma

## 2024-09-05 NOTE — ED PROVIDER NOTE - CADM POA URETHRAL CATHETER
Patient received most of the first of two units PRBCs ordered by primary team (his original IV infiltrated and some of the infusion extravasated); however, blood bank requires ordering physician's signature and, as a result, the 2nd unit was held.  Patient's vitals stable and CBC ordered to be obtained in approx 4 hours from the time of this note's entry.     Discussed with pathology who, under protocol is also required to sign, and path agrees with holding 2nd unit at this time.  Await AM CBC and repeat transfusion as needed.   No

## 2024-09-05 NOTE — ADDENDUM
[FreeTextEntry1] : This note was written by Alva Burden on 09/05/2024, acting solely as a scribe for Dr. Enzo Mcclain MD. I have documented the information dictated during the patient encounter for the following sections: RFV, HPI, ROS, PE, ASSESSMENT/PLAN.   I personally performed the services described in the documentation, reviewed the documentation recorded by the scribe in my presence, and it accurately and completely records my words and actions.

## 2024-09-05 NOTE — HISTORY OF PRESENT ILLNESS
[de-identified] : Mr Zimmer is a very pleasant 68 year old male with a history of HTN, T2DM, and ascites, who was found to have peritoneal carcinomatosis, and Left hydronephrosis s/p Left ureteroscopy and biopsy and a subsequent bilateral pulmonary embolus with pathology consistent with metastatic urothelial carcinoma.   He was found to have ascites in [facility] and then presented to [facility] with persistent left flank pain found to have hydronephrosis due to a ureteral lesion as well as omental densities suspicious for underlying malignancy. Pt admitted to [facility] for hydronephrosis. Urology and GI were consulted. Pt underwent nephrostomy tube placement on 8/2 and tolerated procedure well without any complications.  GI was consulted for omental densities founded on imaging.  given possibility for malignancy, IR was consulted, and recommended IR biopsy and/or paracentesis as Outpatient.  Ascites was positive for malignant cells.  CT guided omental biopsy was performed on 08/07/23.  Pathology showed a poorly differentiated carcinoma. IHC was negative for calretinin, GATA3, chromogranin, synaptophysin, SOX10, S100, and D2-40 (podoplanin).  Immunostains for CD3 and CD20 highlights background T and B cells.  The overall histology and immunophenotype is consistent with a poorly differentiated carcinoma, primary site undetermined(possible source is  tract and clinical correlation recommended). On 08/24/23 he underwent cystoscopy with left retrograde pyelogram, left ureteroscopy with biopsy of mid left ureteral mass.  That biopsy result is pending. He was readmitted to the hospital after that for shortness of breath and was found to have a bilateral pulmonary embolus and he was started on eliquis. Today he comes for medical oncology consultation. He feels SOB when he lies down flat.  His O2 sat is 98% on RA. He denies any fever, chills, chest pain, SOB, nausea, vomiting, diarrhea, constipation, dysuria, hematuria, hematochezia, or melena.  [de-identified] : Patient presents on C1D8 of Padcev for metastatic urothelial carcinoma.  He is s/p 4 cycles of maintenance [place]/Cisplatin then 4 cycles of Avelumab changed for POD.  + Mild, non-pruritic rash, LUE and anterior chest. + Dysgeusia with some foods. Otherwise tolerating treatment well so far.  Denies fatigue, alopecia, H/F syndrome N/V/D/C, decreased appetite, neuropathy, fevers, chills or night sweats.    07/03/24: Mr Zimmer returns for follow up.  Feeling well with good energy and appetite. Will go to [facility] for the next 2 weeks, will get imaging when he returns and he will follow up to discuss the results. Tolerating Padcev without issue.   09/05/2024: Mr. Zimmer presents for follow up, accompanied by his son. Recently lost both his mother and father (natural causes). Son endorses the patient has improved in appetite and gained weight. The patient's son endorses that the treatment was causing the patient some dizziness. Given the patient's current grief, he would like to take a break from treatment at this time.

## 2024-09-05 NOTE — RESULTS/DATA
[FreeTextEntry1] : ***CT A/P - 08/27/2024*** IMPRESSION: - Interval decrease in size of left distal ureteral mass and resolution of left hydronephrosis and ascites. Stable to decreased appearance of peritoneal carcinomatosis. --- End of Report ---

## 2024-09-05 NOTE — ASSESSMENT
[FreeTextEntry1] : Mr Zimmer is a very pleasant 68 year old man with a history of hypertension, DM, ascites, and bilateral PE who was recently found to have carcinomatosis and a left ureteral mass consistent with stage IV urothelial carcinoma  #Urothelial carcinoma: Stage IV disease - final pathology from ureteral mass: infiltrating poorly differentiated carcinoma  - Dr. Mcclain recommended palliative gemcitabine/cisplatin, D1 and D8 Q21 days x 4 cycles then obtain imaging. - then followed by avelumab maintenance as no disease progression on imaging. - CT to assess response done on 12/8/23:  No evidence of disease.  Previously noted ascites and peritoneal carcinomatosis have resolved. - requested [facility] full genetic interrogation of the tumor to assess for actionable mutations to further guide therapy if needed for POD.  -CT from 3/4/24 shows increasing left urothelial mass with moderate left hydroureteronephrosis to level the mass. Marked interval increase in the degree of ascites and peritoneal carcinomatosis as compared to 8/7/2023.  -Given disease progression treatment switched to Enfortumab Vedotin(Padcev) day 1,8,15 in a 28 day cycle.   -today is C1D8, tolerated well so far.   -RTC D1, 8 and 15 Q4 weeks for treatment with MD/PA follow up Q4 weeks   07/03/24: Mr Zimmer returns for follow up.  Feeling well with good energy and appetite. Will go to [facility] for the next 2 weeks, will get imaging when he returns and he will follow up to discuss the results. Tolerating Padcev without issue.   09/05/2024: Mr. Zimmer presents for follow up. He is doing well at this time. Reviewed the patient's most recent imaging, which demonstrated positive response to disease/tumor regression. In support of the patient's grief, we will resume treatment in 1 month. Prior to treatment we will obtain repeat CT A/P.

## 2024-10-07 ENCOUNTER — RESULT REVIEW (OUTPATIENT)
Age: 69
End: 2024-10-07

## 2024-10-07 DIAGNOSIS — C68.9 MALIGNANT NEOPLASM OF URINARY ORGAN, UNSPECIFIED: ICD-10-CM

## 2024-10-08 ENCOUNTER — RESULT REVIEW (OUTPATIENT)
Age: 69
End: 2024-10-08

## 2024-10-08 LAB
ALBUMIN SERPL ELPH-MCNC: 3.8 G/DL
ALP BLD-CCNC: 158 U/L
ALT SERPL-CCNC: 7 U/L
ANION GAP SERPL CALC-SCNC: 12 MMOL/L
AST SERPL-CCNC: 18 U/L
BASOPHILS # BLD AUTO: 0.1 K/UL — SIGNIFICANT CHANGE UP (ref 0–0.2)
BASOPHILS NFR BLD AUTO: 1.2 % — SIGNIFICANT CHANGE UP (ref 0–2)
BILIRUB SERPL-MCNC: 0.5 MG/DL
BUN SERPL-MCNC: 13 MG/DL
CALCIUM SERPL-MCNC: 9.2 MG/DL
CHLORIDE SERPL-SCNC: 103 MMOL/L
CO2 SERPL-SCNC: 24 MMOL/L
CREAT SERPL-MCNC: 1.09 MG/DL
EGFR: 75 ML/MIN/1.73M2
EOSINOPHIL # BLD AUTO: 0.2 K/UL — SIGNIFICANT CHANGE UP (ref 0–0.5)
EOSINOPHIL NFR BLD AUTO: 2.2 % — SIGNIFICANT CHANGE UP (ref 0–6)
GLUCOSE SERPL-MCNC: 182 MG/DL
HCT VFR BLD CALC: 40.6 % — SIGNIFICANT CHANGE UP (ref 39–50)
HGB BLD-MCNC: 12.9 G/DL — LOW (ref 13–17)
INR PPP: 1.09 RATIO
LYMPHOCYTES # BLD AUTO: 1.6 K/UL — SIGNIFICANT CHANGE UP (ref 1–3.3)
LYMPHOCYTES # BLD AUTO: 18.7 % — SIGNIFICANT CHANGE UP (ref 13–44)
MCHC RBC-ENTMCNC: 28.5 PG — SIGNIFICANT CHANGE UP (ref 27–34)
MCHC RBC-ENTMCNC: 31.8 G/DL — LOW (ref 32–36)
MCV RBC AUTO: 89.5 FL — SIGNIFICANT CHANGE UP (ref 80–100)
MONOCYTES # BLD AUTO: 0.5 K/UL — SIGNIFICANT CHANGE UP (ref 0–0.9)
MONOCYTES NFR BLD AUTO: 5.9 % — SIGNIFICANT CHANGE UP (ref 2–14)
NEUTROPHILS # BLD AUTO: 6.1 K/UL — SIGNIFICANT CHANGE UP (ref 1.8–7.4)
NEUTROPHILS NFR BLD AUTO: 72 % — SIGNIFICANT CHANGE UP (ref 43–77)
PLATELET # BLD AUTO: 291 K/UL — SIGNIFICANT CHANGE UP (ref 150–400)
POTASSIUM SERPL-SCNC: 4.4 MMOL/L
PROT SERPL-MCNC: 6.7 G/DL
PT BLD: 12.9 SEC
RBC # BLD: 4.54 M/UL — SIGNIFICANT CHANGE UP (ref 4.2–5.8)
RBC # FLD: 12.6 % — SIGNIFICANT CHANGE UP (ref 10.3–14.5)
SODIUM SERPL-SCNC: 140 MMOL/L
WBC # BLD: 8.5 K/UL — SIGNIFICANT CHANGE UP (ref 3.8–10.5)
WBC # FLD AUTO: 8.5 K/UL — SIGNIFICANT CHANGE UP (ref 3.8–10.5)

## 2024-10-09 ENCOUNTER — APPOINTMENT (OUTPATIENT)
Dept: INTERVENTIONAL RADIOLOGY/VASCULAR | Facility: CLINIC | Age: 69
End: 2024-10-09

## 2024-10-09 ENCOUNTER — LABORATORY RESULT (OUTPATIENT)
Age: 69
End: 2024-10-09

## 2024-10-09 ENCOUNTER — OUTPATIENT (OUTPATIENT)
Dept: OUTPATIENT SERVICES | Facility: HOSPITAL | Age: 69
LOS: 1 days | End: 2024-10-09
Payer: MEDICARE

## 2024-10-09 VITALS
HEART RATE: 90 BPM | DIASTOLIC BLOOD PRESSURE: 99 MMHG | TEMPERATURE: 98 F | RESPIRATION RATE: 16 BRPM | OXYGEN SATURATION: 99 % | SYSTOLIC BLOOD PRESSURE: 155 MMHG

## 2024-10-09 DIAGNOSIS — Z98.890 OTHER SPECIFIED POSTPROCEDURAL STATES: Chronic | ICD-10-CM

## 2024-10-09 DIAGNOSIS — Z90.49 ACQUIRED ABSENCE OF OTHER SPECIFIED PARTS OF DIGESTIVE TRACT: Chronic | ICD-10-CM

## 2024-10-09 PROCEDURE — 49083 ABD PARACENTESIS W/IMAGING: CPT

## 2024-10-14 ENCOUNTER — APPOINTMENT (OUTPATIENT)
Dept: HEMATOLOGY ONCOLOGY | Facility: CLINIC | Age: 69
End: 2024-10-14

## 2024-10-14 ENCOUNTER — RESULT REVIEW (OUTPATIENT)
Age: 69
End: 2024-10-14

## 2024-10-14 ENCOUNTER — APPOINTMENT (OUTPATIENT)
Dept: CT IMAGING | Facility: CLINIC | Age: 69
End: 2024-10-14

## 2024-10-14 ENCOUNTER — OUTPATIENT (OUTPATIENT)
Dept: OUTPATIENT SERVICES | Facility: HOSPITAL | Age: 69
LOS: 1 days | End: 2024-10-14
Payer: MEDICARE

## 2024-10-14 DIAGNOSIS — Z98.890 OTHER SPECIFIED POSTPROCEDURAL STATES: Chronic | ICD-10-CM

## 2024-10-14 DIAGNOSIS — C68.9 MALIGNANT NEOPLASM OF URINARY ORGAN, UNSPECIFIED: ICD-10-CM

## 2024-10-14 DIAGNOSIS — Z90.49 ACQUIRED ABSENCE OF OTHER SPECIFIED PARTS OF DIGESTIVE TRACT: Chronic | ICD-10-CM

## 2024-10-14 LAB
ALBUMIN SERPL ELPH-MCNC: 3.2 G/DL
ALP BLD-CCNC: 165 U/L
ALT SERPL-CCNC: 12 U/L
ANION GAP SERPL CALC-SCNC: 12 MMOL/L
AST SERPL-CCNC: 14 U/L
BASOPHILS # BLD AUTO: 0.1 K/UL — SIGNIFICANT CHANGE UP (ref 0–0.2)
BASOPHILS NFR BLD AUTO: 0.7 % — SIGNIFICANT CHANGE UP (ref 0–2)
BILIRUB SERPL-MCNC: 0.2 MG/DL
BUN SERPL-MCNC: 14 MG/DL
CALCIUM SERPL-MCNC: 8.7 MG/DL
CHLORIDE SERPL-SCNC: 97 MMOL/L
CO2 SERPL-SCNC: 26 MMOL/L
CREAT SERPL-MCNC: 1.03 MG/DL
EGFR: 80 ML/MIN/1.73M2
EOSINOPHIL # BLD AUTO: 0.1 K/UL — SIGNIFICANT CHANGE UP (ref 0–0.5)
EOSINOPHIL NFR BLD AUTO: 1.1 % — SIGNIFICANT CHANGE UP (ref 0–6)
GLUCOSE SERPL-MCNC: 234 MG/DL
HCT VFR BLD CALC: 39.3 % — SIGNIFICANT CHANGE UP (ref 39–50)
HGB BLD-MCNC: 12.8 G/DL — LOW (ref 13–17)
LYMPHOCYTES # BLD AUTO: 1.3 K/UL — SIGNIFICANT CHANGE UP (ref 1–3.3)
LYMPHOCYTES # BLD AUTO: 12.6 % — LOW (ref 13–44)
MAGNESIUM SERPL-MCNC: 1.7 MG/DL
MCHC RBC-ENTMCNC: 28.2 PG — SIGNIFICANT CHANGE UP (ref 27–34)
MCHC RBC-ENTMCNC: 32.7 G/DL — SIGNIFICANT CHANGE UP (ref 32–36)
MCV RBC AUTO: 86.2 FL — SIGNIFICANT CHANGE UP (ref 80–100)
MONOCYTES # BLD AUTO: 0.8 K/UL — SIGNIFICANT CHANGE UP (ref 0–0.9)
MONOCYTES NFR BLD AUTO: 7.6 % — SIGNIFICANT CHANGE UP (ref 2–14)
NEUTROPHILS # BLD AUTO: 8.2 K/UL — HIGH (ref 1.8–7.4)
NEUTROPHILS NFR BLD AUTO: 78 % — HIGH (ref 43–77)
PLATELET # BLD AUTO: 334 K/UL — SIGNIFICANT CHANGE UP (ref 150–400)
POTASSIUM SERPL-SCNC: 4.7 MMOL/L
PROT SERPL-MCNC: 6.1 G/DL
RBC # BLD: 4.56 M/UL — SIGNIFICANT CHANGE UP (ref 4.2–5.8)
RBC # FLD: 12.1 % — SIGNIFICANT CHANGE UP (ref 10.3–14.5)
SODIUM SERPL-SCNC: 134 MMOL/L
WBC # BLD: 10.5 K/UL — SIGNIFICANT CHANGE UP (ref 3.8–10.5)
WBC # FLD AUTO: 10.5 K/UL — SIGNIFICANT CHANGE UP (ref 3.8–10.5)

## 2024-10-14 PROCEDURE — 71260 CT THORAX DX C+: CPT | Mod: 26

## 2024-10-14 PROCEDURE — 74177 CT ABD & PELVIS W/CONTRAST: CPT | Mod: 26

## 2024-10-15 ENCOUNTER — APPOINTMENT (OUTPATIENT)
Age: 69
End: 2024-10-15

## 2024-10-16 DIAGNOSIS — R11.2 NAUSEA WITH VOMITING, UNSPECIFIED: ICD-10-CM

## 2024-10-16 DIAGNOSIS — Z51.11 ENCOUNTER FOR ANTINEOPLASTIC CHEMOTHERAPY: ICD-10-CM

## 2024-10-21 ENCOUNTER — APPOINTMENT (OUTPATIENT)
Dept: HEMATOLOGY ONCOLOGY | Facility: CLINIC | Age: 69
End: 2024-10-21

## 2024-10-21 ENCOUNTER — RESULT REVIEW (OUTPATIENT)
Age: 69
End: 2024-10-21

## 2024-10-21 LAB
BASOPHILS # BLD AUTO: 0.1 K/UL — SIGNIFICANT CHANGE UP (ref 0–0.2)
BASOPHILS NFR BLD AUTO: 1.6 % — SIGNIFICANT CHANGE UP (ref 0–2)
EOSINOPHIL # BLD AUTO: 0.1 K/UL — SIGNIFICANT CHANGE UP (ref 0–0.5)
EOSINOPHIL NFR BLD AUTO: 1.6 % — SIGNIFICANT CHANGE UP (ref 0–6)
HCT VFR BLD CALC: 40.1 % — SIGNIFICANT CHANGE UP (ref 39–50)
HGB BLD-MCNC: 12.6 G/DL — LOW (ref 13–17)
LYMPHOCYTES # BLD AUTO: 1.6 K/UL — SIGNIFICANT CHANGE UP (ref 1–3.3)
LYMPHOCYTES # BLD AUTO: 18.2 % — SIGNIFICANT CHANGE UP (ref 13–44)
MCHC RBC-ENTMCNC: 28 PG — SIGNIFICANT CHANGE UP (ref 27–34)
MCHC RBC-ENTMCNC: 31.5 G/DL — LOW (ref 32–36)
MCV RBC AUTO: 89.1 FL — SIGNIFICANT CHANGE UP (ref 80–100)
MONOCYTES # BLD AUTO: 0.5 K/UL — SIGNIFICANT CHANGE UP (ref 0–0.9)
MONOCYTES NFR BLD AUTO: 5.7 % — SIGNIFICANT CHANGE UP (ref 2–14)
NEUTROPHILS # BLD AUTO: 6.2 K/UL — SIGNIFICANT CHANGE UP (ref 1.8–7.4)
NEUTROPHILS NFR BLD AUTO: 72.9 % — SIGNIFICANT CHANGE UP (ref 43–77)
PLATELET # BLD AUTO: 350 K/UL — SIGNIFICANT CHANGE UP (ref 150–400)
RBC # BLD: 4.5 M/UL — SIGNIFICANT CHANGE UP (ref 4.2–5.8)
RBC # FLD: 14.5 % — SIGNIFICANT CHANGE UP (ref 10.3–14.5)
WBC # BLD: 8.6 K/UL — SIGNIFICANT CHANGE UP (ref 3.8–10.5)
WBC # FLD AUTO: 8.6 K/UL — SIGNIFICANT CHANGE UP (ref 3.8–10.5)

## 2024-10-22 ENCOUNTER — RESULT REVIEW (OUTPATIENT)
Age: 69
End: 2024-10-22

## 2024-10-22 ENCOUNTER — APPOINTMENT (OUTPATIENT)
Age: 69
End: 2024-10-22

## 2024-10-22 LAB
ALBUMIN SERPL ELPH-MCNC: 3.6 G/DL
ALP BLD-CCNC: 173 U/L
ALT SERPL-CCNC: 15 U/L
ANION GAP SERPL CALC-SCNC: 13 MMOL/L
AST SERPL-CCNC: 38 U/L
BILIRUB SERPL-MCNC: 0.4 MG/DL
BUN SERPL-MCNC: 14 MG/DL
CALCIUM SERPL-MCNC: 9.4 MG/DL
CHLORIDE SERPL-SCNC: 97 MMOL/L
CO2 SERPL-SCNC: 25 MMOL/L
CREAT SERPL-MCNC: 1.02 MG/DL
EGFR: 81 ML/MIN/1.73M2
GLUCOSE SERPL-MCNC: 178 MG/DL
MAGNESIUM SERPL-MCNC: 2.1 MG/DL
POTASSIUM SERPL-SCNC: 6.4 MMOL/L
PROT SERPL-MCNC: 7 G/DL
SODIUM SERPL-SCNC: 136 MMOL/L

## 2024-10-23 LAB
ALBUMIN SERPL ELPH-MCNC: 3.7 G/DL — SIGNIFICANT CHANGE UP (ref 3.3–5)
ALP SERPL-CCNC: 150 U/L — HIGH (ref 40–120)
ALT FLD-CCNC: 17 U/L — SIGNIFICANT CHANGE UP (ref 10–45)
ANION GAP SERPL CALC-SCNC: 15 MMOL/L — SIGNIFICANT CHANGE UP (ref 5–17)
AST SERPL-CCNC: 27 U/L — SIGNIFICANT CHANGE UP (ref 10–40)
BILIRUB SERPL-MCNC: 0.2 MG/DL — SIGNIFICANT CHANGE UP (ref 0.2–1.2)
BUN SERPL-MCNC: 13 MG/DL — SIGNIFICANT CHANGE UP (ref 7–23)
CALCIUM SERPL-MCNC: 9.2 MG/DL — SIGNIFICANT CHANGE UP (ref 8.4–10.5)
CHLORIDE SERPL-SCNC: 98 MMOL/L — SIGNIFICANT CHANGE UP (ref 96–108)
CO2 SERPL-SCNC: 22 MMOL/L — SIGNIFICANT CHANGE UP (ref 22–31)
CREAT SERPL-MCNC: 0.97 MG/DL — SIGNIFICANT CHANGE UP (ref 0.5–1.3)
EGFR: 86 ML/MIN/1.73M2 — SIGNIFICANT CHANGE UP
GLUCOSE SERPL-MCNC: 182 MG/DL — HIGH (ref 70–99)
POTASSIUM SERPL-MCNC: 4.1 MMOL/L — SIGNIFICANT CHANGE UP (ref 3.5–5.3)
POTASSIUM SERPL-SCNC: 4.1 MMOL/L — SIGNIFICANT CHANGE UP (ref 3.5–5.3)
PROT SERPL-MCNC: 7.4 G/DL — SIGNIFICANT CHANGE UP (ref 6–8.3)
SODIUM SERPL-SCNC: 135 MMOL/L — SIGNIFICANT CHANGE UP (ref 135–145)

## 2024-10-24 ENCOUNTER — APPOINTMENT (OUTPATIENT)
Dept: HEMATOLOGY ONCOLOGY | Facility: CLINIC | Age: 69
End: 2024-10-24
Payer: MEDICARE

## 2024-10-24 PROCEDURE — 99214 OFFICE O/P EST MOD 30 MIN: CPT

## 2024-10-25 ENCOUNTER — OUTPATIENT (OUTPATIENT)
Dept: OUTPATIENT SERVICES | Facility: HOSPITAL | Age: 69
LOS: 1 days | Discharge: HOME | End: 2024-10-25

## 2024-10-25 DIAGNOSIS — Z98.890 OTHER SPECIFIED POSTPROCEDURAL STATES: Chronic | ICD-10-CM

## 2024-10-25 DIAGNOSIS — C68.1: ICD-10-CM

## 2024-10-28 ENCOUNTER — APPOINTMENT (OUTPATIENT)
Dept: HEMATOLOGY ONCOLOGY | Facility: CLINIC | Age: 69
End: 2024-10-28

## 2024-10-28 ENCOUNTER — RESULT REVIEW (OUTPATIENT)
Age: 69
End: 2024-10-28

## 2024-10-28 LAB
ALBUMIN SERPL ELPH-MCNC: 3.6 G/DL
ALP BLD-CCNC: 164 U/L
ALT SERPL-CCNC: 17 U/L
ANION GAP SERPL CALC-SCNC: 14 MMOL/L
AST SERPL-CCNC: 24 U/L
BASOPHILS # BLD AUTO: 0.1 K/UL — SIGNIFICANT CHANGE UP (ref 0–0.2)
BASOPHILS NFR BLD AUTO: 2.1 % — HIGH (ref 0–2)
BILIRUB SERPL-MCNC: 0.4 MG/DL
BUN SERPL-MCNC: 13 MG/DL
CALCIUM SERPL-MCNC: 9.2 MG/DL
CHLORIDE SERPL-SCNC: 101 MMOL/L
CO2 SERPL-SCNC: 22 MMOL/L
CREAT SERPL-MCNC: 1.04 MG/DL
EGFR: 79 ML/MIN/1.73M2
EOSINOPHIL # BLD AUTO: 0.2 K/UL — SIGNIFICANT CHANGE UP (ref 0–0.5)
EOSINOPHIL NFR BLD AUTO: 3 % — SIGNIFICANT CHANGE UP (ref 0–6)
GLUCOSE SERPL-MCNC: 140 MG/DL
HCT VFR BLD CALC: 36.2 % — LOW (ref 39–50)
HGB BLD-MCNC: 11.4 G/DL — LOW (ref 13–17)
LYMPHOCYTES # BLD AUTO: 1.2 K/UL — SIGNIFICANT CHANGE UP (ref 1–3.3)
LYMPHOCYTES # BLD AUTO: 19.1 % — SIGNIFICANT CHANGE UP (ref 13–44)
MAGNESIUM SERPL-MCNC: 2 MG/DL
MCHC RBC-ENTMCNC: 27.9 PG — SIGNIFICANT CHANGE UP (ref 27–34)
MCHC RBC-ENTMCNC: 31.4 G/DL — LOW (ref 32–36)
MCV RBC AUTO: 88.7 FL — SIGNIFICANT CHANGE UP (ref 80–100)
MONOCYTES # BLD AUTO: 0.4 K/UL — SIGNIFICANT CHANGE UP (ref 0–0.9)
MONOCYTES NFR BLD AUTO: 7.4 % — SIGNIFICANT CHANGE UP (ref 2–14)
NEUTROPHILS # BLD AUTO: 4.1 K/UL — SIGNIFICANT CHANGE UP (ref 1.8–7.4)
NEUTROPHILS NFR BLD AUTO: 68.4 % — SIGNIFICANT CHANGE UP (ref 43–77)
PLATELET # BLD AUTO: 351 K/UL — SIGNIFICANT CHANGE UP (ref 150–400)
POTASSIUM SERPL-SCNC: 4.3 MMOL/L
PROT SERPL-MCNC: 6.9 G/DL
RBC # BLD: 4.08 M/UL — LOW (ref 4.2–5.8)
RBC # FLD: 15.1 % — HIGH (ref 10.3–14.5)
SODIUM SERPL-SCNC: 137 MMOL/L
WBC # BLD: 6 K/UL — SIGNIFICANT CHANGE UP (ref 3.8–10.5)
WBC # FLD AUTO: 6 K/UL — SIGNIFICANT CHANGE UP (ref 3.8–10.5)

## 2024-10-29 ENCOUNTER — APPOINTMENT (OUTPATIENT)
Dept: HEMATOLOGY ONCOLOGY | Facility: CLINIC | Age: 69
End: 2024-10-29

## 2024-10-29 ENCOUNTER — APPOINTMENT (OUTPATIENT)
Age: 69
End: 2024-10-29

## 2024-10-30 DIAGNOSIS — Z51.11 ENCOUNTER FOR ANTINEOPLASTIC CHEMOTHERAPY: ICD-10-CM

## 2024-10-30 DIAGNOSIS — R11.2 NAUSEA WITH VOMITING, UNSPECIFIED: ICD-10-CM

## 2024-11-04 ENCOUNTER — APPOINTMENT (OUTPATIENT)
Dept: HEMATOLOGY ONCOLOGY | Facility: CLINIC | Age: 69
End: 2024-11-04

## 2024-11-05 ENCOUNTER — APPOINTMENT (OUTPATIENT)
Dept: HEMATOLOGY ONCOLOGY | Facility: CLINIC | Age: 69
End: 2024-11-05

## 2024-11-11 ENCOUNTER — RESULT REVIEW (OUTPATIENT)
Age: 69
End: 2024-11-11

## 2024-11-11 ENCOUNTER — APPOINTMENT (OUTPATIENT)
Dept: HEMATOLOGY ONCOLOGY | Facility: CLINIC | Age: 69
End: 2024-11-11

## 2024-11-11 LAB
BASOPHILS # BLD AUTO: 0.1 K/UL — SIGNIFICANT CHANGE UP (ref 0–0.2)
BASOPHILS NFR BLD AUTO: 1.4 % — SIGNIFICANT CHANGE UP (ref 0–2)
EOSINOPHIL # BLD AUTO: 0.2 K/UL — SIGNIFICANT CHANGE UP (ref 0–0.5)
EOSINOPHIL NFR BLD AUTO: 3 % — SIGNIFICANT CHANGE UP (ref 0–6)
HCT VFR BLD CALC: 36.4 % — LOW (ref 39–50)
HGB BLD-MCNC: 11.8 G/DL — LOW (ref 13–17)
LYMPHOCYTES # BLD AUTO: 1.1 K/UL — SIGNIFICANT CHANGE UP (ref 1–3.3)
LYMPHOCYTES # BLD AUTO: 21.3 % — SIGNIFICANT CHANGE UP (ref 13–44)
MCHC RBC-ENTMCNC: 28.2 PG — SIGNIFICANT CHANGE UP (ref 27–34)
MCHC RBC-ENTMCNC: 32.5 G/DL — SIGNIFICANT CHANGE UP (ref 32–36)
MCV RBC AUTO: 86.6 FL — SIGNIFICANT CHANGE UP (ref 80–100)
MONOCYTES # BLD AUTO: 0.5 K/UL — SIGNIFICANT CHANGE UP (ref 0–0.9)
MONOCYTES NFR BLD AUTO: 9.9 % — SIGNIFICANT CHANGE UP (ref 2–14)
NEUTROPHILS # BLD AUTO: 3.4 K/UL — SIGNIFICANT CHANGE UP (ref 1.8–7.4)
NEUTROPHILS NFR BLD AUTO: 64.4 % — SIGNIFICANT CHANGE UP (ref 43–77)
PLATELET # BLD AUTO: 354 K/UL — SIGNIFICANT CHANGE UP (ref 150–400)
RBC # BLD: 4.2 M/UL — SIGNIFICANT CHANGE UP (ref 4.2–5.8)
RBC # FLD: 15.5 % — HIGH (ref 10.3–14.5)
WBC # BLD: 5.2 K/UL — SIGNIFICANT CHANGE UP (ref 3.8–10.5)
WBC # FLD AUTO: 5.2 K/UL — SIGNIFICANT CHANGE UP (ref 3.8–10.5)

## 2024-11-12 ENCOUNTER — APPOINTMENT (OUTPATIENT)
Age: 69
End: 2024-11-12

## 2024-11-12 ENCOUNTER — APPOINTMENT (OUTPATIENT)
Dept: HEMATOLOGY ONCOLOGY | Facility: CLINIC | Age: 69
End: 2024-11-12

## 2024-11-12 ENCOUNTER — RESULT REVIEW (OUTPATIENT)
Age: 69
End: 2024-11-12

## 2024-11-13 LAB
ALBUMIN SERPL ELPH-MCNC: 3.8 G/DL — SIGNIFICANT CHANGE UP (ref 3.3–5)
ALP SERPL-CCNC: 127 U/L — HIGH (ref 40–120)
ALT FLD-CCNC: 15 U/L — SIGNIFICANT CHANGE UP (ref 10–45)
ANION GAP SERPL CALC-SCNC: 13 MMOL/L — SIGNIFICANT CHANGE UP (ref 5–17)
AST SERPL-CCNC: 24 U/L — SIGNIFICANT CHANGE UP (ref 10–40)
BILIRUB SERPL-MCNC: 0.3 MG/DL — SIGNIFICANT CHANGE UP (ref 0.2–1.2)
BUN SERPL-MCNC: 16 MG/DL — SIGNIFICANT CHANGE UP (ref 7–23)
CALCIUM SERPL-MCNC: 9.5 MG/DL — SIGNIFICANT CHANGE UP (ref 8.4–10.5)
CHLORIDE SERPL-SCNC: 103 MMOL/L — SIGNIFICANT CHANGE UP (ref 96–108)
CO2 SERPL-SCNC: 24 MMOL/L — SIGNIFICANT CHANGE UP (ref 22–31)
CREAT SERPL-MCNC: 0.98 MG/DL — SIGNIFICANT CHANGE UP (ref 0.5–1.3)
EGFR: 85 ML/MIN/1.73M2 — SIGNIFICANT CHANGE UP
GLUCOSE SERPL-MCNC: 135 MG/DL — HIGH (ref 70–99)
MAGNESIUM SERPL-MCNC: 2.2 MG/DL — SIGNIFICANT CHANGE UP (ref 1.6–2.6)
POTASSIUM SERPL-MCNC: 4 MMOL/L — SIGNIFICANT CHANGE UP (ref 3.5–5.3)
POTASSIUM SERPL-SCNC: 4 MMOL/L — SIGNIFICANT CHANGE UP (ref 3.5–5.3)
PROT SERPL-MCNC: 7.8 G/DL — SIGNIFICANT CHANGE UP (ref 6–8.3)
SODIUM SERPL-SCNC: 139 MMOL/L — SIGNIFICANT CHANGE UP (ref 135–145)

## 2024-11-18 ENCOUNTER — APPOINTMENT (OUTPATIENT)
Dept: HEMATOLOGY ONCOLOGY | Facility: CLINIC | Age: 69
End: 2024-11-18

## 2024-11-18 ENCOUNTER — RESULT REVIEW (OUTPATIENT)
Age: 69
End: 2024-11-18

## 2024-11-18 LAB
ALBUMIN SERPL ELPH-MCNC: 4.2 G/DL
ALP BLD-CCNC: 143 U/L
ALT SERPL-CCNC: 14 U/L
ANION GAP SERPL CALC-SCNC: 14 MMOL/L
AST SERPL-CCNC: 19 U/L
BASOPHILS # BLD AUTO: 0.1 K/UL — SIGNIFICANT CHANGE UP (ref 0–0.2)
BASOPHILS NFR BLD AUTO: 1 % — SIGNIFICANT CHANGE UP (ref 0–2)
BILIRUB SERPL-MCNC: 0.3 MG/DL
BUN SERPL-MCNC: 15 MG/DL
CALCIUM SERPL-MCNC: 10.2 MG/DL
CHLORIDE SERPL-SCNC: 99 MMOL/L
CO2 SERPL-SCNC: 23 MMOL/L
CREAT SERPL-MCNC: 0.93 MG/DL
EGFR: 91 ML/MIN/1.73M2
EOSINOPHIL # BLD AUTO: 0.1 K/UL — SIGNIFICANT CHANGE UP (ref 0–0.5)
EOSINOPHIL NFR BLD AUTO: 1.1 % — SIGNIFICANT CHANGE UP (ref 0–6)
GLUCOSE SERPL-MCNC: 161 MG/DL
HCT VFR BLD CALC: 39 % — SIGNIFICANT CHANGE UP (ref 39–50)
HGB BLD-MCNC: 12.6 G/DL — LOW (ref 13–17)
LYMPHOCYTES # BLD AUTO: 1.5 K/UL — SIGNIFICANT CHANGE UP (ref 1–3.3)
LYMPHOCYTES # BLD AUTO: 20.5 % — SIGNIFICANT CHANGE UP (ref 13–44)
MAGNESIUM SERPL-MCNC: 2 MG/DL
MCHC RBC-ENTMCNC: 28.4 PG — SIGNIFICANT CHANGE UP (ref 27–34)
MCHC RBC-ENTMCNC: 32.3 G/DL — SIGNIFICANT CHANGE UP (ref 32–36)
MCV RBC AUTO: 87.8 FL — SIGNIFICANT CHANGE UP (ref 80–100)
MONOCYTES # BLD AUTO: 0.7 K/UL — SIGNIFICANT CHANGE UP (ref 0–0.9)
MONOCYTES NFR BLD AUTO: 9.2 % — SIGNIFICANT CHANGE UP (ref 2–14)
NEUTROPHILS # BLD AUTO: 5 K/UL — SIGNIFICANT CHANGE UP (ref 1.8–7.4)
NEUTROPHILS NFR BLD AUTO: 68.2 % — SIGNIFICANT CHANGE UP (ref 43–77)
PLATELET # BLD AUTO: 312 K/UL — SIGNIFICANT CHANGE UP (ref 150–400)
POTASSIUM SERPL-SCNC: 4.2 MMOL/L
PROT SERPL-MCNC: 8 G/DL
RBC # BLD: 4.44 M/UL — SIGNIFICANT CHANGE UP (ref 4.2–5.8)
RBC # FLD: 14.9 % — HIGH (ref 10.3–14.5)
SODIUM SERPL-SCNC: 137 MMOL/L
WBC # BLD: 7.4 K/UL — SIGNIFICANT CHANGE UP (ref 3.8–10.5)
WBC # FLD AUTO: 7.4 K/UL — SIGNIFICANT CHANGE UP (ref 3.8–10.5)

## 2024-11-19 ENCOUNTER — APPOINTMENT (OUTPATIENT)
Dept: HEMATOLOGY ONCOLOGY | Facility: CLINIC | Age: 69
End: 2024-11-19
Payer: MEDICARE

## 2024-11-19 ENCOUNTER — NON-APPOINTMENT (OUTPATIENT)
Age: 69
End: 2024-11-19

## 2024-11-19 ENCOUNTER — APPOINTMENT (OUTPATIENT)
Dept: HEMATOLOGY ONCOLOGY | Facility: CLINIC | Age: 69
End: 2024-11-19

## 2024-11-19 ENCOUNTER — APPOINTMENT (OUTPATIENT)
Age: 69
End: 2024-11-19

## 2024-11-19 VITALS
RESPIRATION RATE: 16 BRPM | HEIGHT: 66 IN | OXYGEN SATURATION: 98 % | WEIGHT: 152.45 LBS | HEART RATE: 98 BPM | SYSTOLIC BLOOD PRESSURE: 130 MMHG | BODY MASS INDEX: 24.5 KG/M2 | TEMPERATURE: 97 F | DIASTOLIC BLOOD PRESSURE: 91 MMHG

## 2024-11-19 PROCEDURE — 99214 OFFICE O/P EST MOD 30 MIN: CPT

## 2024-11-25 ENCOUNTER — RESULT REVIEW (OUTPATIENT)
Age: 69
End: 2024-11-25

## 2024-11-25 ENCOUNTER — APPOINTMENT (OUTPATIENT)
Dept: HEMATOLOGY ONCOLOGY | Facility: CLINIC | Age: 69
End: 2024-11-25

## 2024-11-25 LAB
ALBUMIN SERPL ELPH-MCNC: 4.4 G/DL
ALP BLD-CCNC: 130 U/L
ALT SERPL-CCNC: 15 U/L
ANION GAP SERPL CALC-SCNC: 15 MMOL/L
AST SERPL-CCNC: 19 U/L
BASOPHILS # BLD AUTO: 0.1 K/UL — SIGNIFICANT CHANGE UP (ref 0–0.2)
BASOPHILS NFR BLD AUTO: 0.9 % — SIGNIFICANT CHANGE UP (ref 0–2)
BILIRUB SERPL-MCNC: 0.5 MG/DL
BUN SERPL-MCNC: 25 MG/DL
CALCIUM SERPL-MCNC: 10.2 MG/DL
CHLORIDE SERPL-SCNC: 100 MMOL/L
CO2 SERPL-SCNC: 22 MMOL/L
CREAT SERPL-MCNC: 1.07 MG/DL
EGFR: 77 ML/MIN/1.73M2
EOSINOPHIL # BLD AUTO: 0 K/UL — SIGNIFICANT CHANGE UP (ref 0–0.5)
EOSINOPHIL NFR BLD AUTO: 0.6 % — SIGNIFICANT CHANGE UP (ref 0–6)
GLUCOSE SERPL-MCNC: 215 MG/DL
HCT VFR BLD CALC: 40.3 % — SIGNIFICANT CHANGE UP (ref 39–50)
HGB BLD-MCNC: 13.2 G/DL — SIGNIFICANT CHANGE UP (ref 13–17)
LYMPHOCYTES # BLD AUTO: 1.8 K/UL — SIGNIFICANT CHANGE UP (ref 1–3.3)
LYMPHOCYTES # BLD AUTO: 22.5 % — SIGNIFICANT CHANGE UP (ref 13–44)
MAGNESIUM SERPL-MCNC: 2.1 MG/DL
MCHC RBC-ENTMCNC: 27.7 PG — SIGNIFICANT CHANGE UP (ref 27–34)
MCHC RBC-ENTMCNC: 32.6 G/DL — SIGNIFICANT CHANGE UP (ref 32–36)
MCV RBC AUTO: 85 FL — SIGNIFICANT CHANGE UP (ref 80–100)
MONOCYTES # BLD AUTO: 0.7 K/UL — SIGNIFICANT CHANGE UP (ref 0–0.9)
MONOCYTES NFR BLD AUTO: 8.7 % — SIGNIFICANT CHANGE UP (ref 2–14)
NEUTROPHILS # BLD AUTO: 5.3 K/UL — SIGNIFICANT CHANGE UP (ref 1.8–7.4)
NEUTROPHILS NFR BLD AUTO: 67.4 % — SIGNIFICANT CHANGE UP (ref 43–77)
PLATELET # BLD AUTO: 326 K/UL — SIGNIFICANT CHANGE UP (ref 150–400)
POTASSIUM SERPL-SCNC: 4.5 MMOL/L
PROT SERPL-MCNC: 8.4 G/DL
RBC # BLD: 4.75 M/UL — SIGNIFICANT CHANGE UP (ref 4.2–5.8)
RBC # FLD: 15.6 % — HIGH (ref 10.3–14.5)
SODIUM SERPL-SCNC: 137 MMOL/L
WBC # BLD: 7.9 K/UL — SIGNIFICANT CHANGE UP (ref 3.8–10.5)
WBC # FLD AUTO: 7.9 K/UL — SIGNIFICANT CHANGE UP (ref 3.8–10.5)

## 2024-11-26 ENCOUNTER — APPOINTMENT (OUTPATIENT)
Age: 69
End: 2024-11-26

## 2024-12-02 ENCOUNTER — RESULT REVIEW (OUTPATIENT)
Age: 69
End: 2024-12-02

## 2024-12-02 ENCOUNTER — APPOINTMENT (OUTPATIENT)
Dept: HEMATOLOGY ONCOLOGY | Facility: CLINIC | Age: 69
End: 2024-12-02

## 2024-12-02 ENCOUNTER — OUTPATIENT (OUTPATIENT)
Dept: OUTPATIENT SERVICES | Facility: HOSPITAL | Age: 69
LOS: 1 days | Discharge: HOME | End: 2024-12-02

## 2024-12-02 DIAGNOSIS — Z90.49 ACQUIRED ABSENCE OF OTHER SPECIFIED PARTS OF DIGESTIVE TRACT: Chronic | ICD-10-CM

## 2024-12-02 DIAGNOSIS — R18.0 MALIGNANT ASCITES: ICD-10-CM

## 2024-12-02 DIAGNOSIS — C68.1: ICD-10-CM

## 2024-12-02 DIAGNOSIS — C78.6 SECONDARY MALIGNANT NEOPLASM OF RETROPERITONEUM AND PERITONEUM: ICD-10-CM

## 2024-12-02 LAB
ALBUMIN SERPL ELPH-MCNC: 4.5 G/DL
ALP BLD-CCNC: 103 U/L
ALT SERPL-CCNC: 13 U/L
ANION GAP SERPL CALC-SCNC: 19 MMOL/L
AST SERPL-CCNC: 11 U/L
BASOPHILS # BLD AUTO: 0.1 K/UL — SIGNIFICANT CHANGE UP (ref 0–0.2)
BASOPHILS NFR BLD AUTO: 0.6 % — SIGNIFICANT CHANGE UP (ref 0–2)
BILIRUB SERPL-MCNC: 0.4 MG/DL
BUN SERPL-MCNC: 25 MG/DL
CALCIUM SERPL-MCNC: 10.6 MG/DL
CHLORIDE SERPL-SCNC: 97 MMOL/L
CO2 SERPL-SCNC: 20 MMOL/L
CREAT SERPL-MCNC: 1.12 MG/DL
EGFR: 72 ML/MIN/1.73M2
EOSINOPHIL # BLD AUTO: 0 K/UL — SIGNIFICANT CHANGE UP (ref 0–0.5)
EOSINOPHIL NFR BLD AUTO: 0.4 % — SIGNIFICANT CHANGE UP (ref 0–6)
GLUCOSE SERPL-MCNC: 189 MG/DL
HCT VFR BLD CALC: 43.2 % — SIGNIFICANT CHANGE UP (ref 39–50)
HGB BLD-MCNC: 14.1 G/DL — SIGNIFICANT CHANGE UP (ref 13–17)
LYMPHOCYTES # BLD AUTO: 2.4 K/UL — SIGNIFICANT CHANGE UP (ref 1–3.3)
LYMPHOCYTES # BLD AUTO: 27.3 % — SIGNIFICANT CHANGE UP (ref 13–44)
MCHC RBC-ENTMCNC: 27.9 PG — SIGNIFICANT CHANGE UP (ref 27–34)
MCHC RBC-ENTMCNC: 32.6 G/DL — SIGNIFICANT CHANGE UP (ref 32–36)
MCV RBC AUTO: 85.6 FL — SIGNIFICANT CHANGE UP (ref 80–100)
MONOCYTES # BLD AUTO: 0.7 K/UL — SIGNIFICANT CHANGE UP (ref 0–0.9)
MONOCYTES NFR BLD AUTO: 7.6 % — SIGNIFICANT CHANGE UP (ref 2–14)
NEUTROPHILS # BLD AUTO: 5.7 K/UL — SIGNIFICANT CHANGE UP (ref 1.8–7.4)
NEUTROPHILS NFR BLD AUTO: 64.2 % — SIGNIFICANT CHANGE UP (ref 43–77)
PLATELET # BLD AUTO: 401 K/UL — HIGH (ref 150–400)
POTASSIUM SERPL-SCNC: 4.6 MMOL/L
PROT SERPL-MCNC: 8.2 G/DL
RBC # BLD: 5.05 M/UL — SIGNIFICANT CHANGE UP (ref 4.2–5.8)
RBC # FLD: 15.8 % — HIGH (ref 10.3–14.5)
SODIUM SERPL-SCNC: 136 MMOL/L
WBC # BLD: 8.9 K/UL — SIGNIFICANT CHANGE UP (ref 3.8–10.5)
WBC # FLD AUTO: 8.9 K/UL — SIGNIFICANT CHANGE UP (ref 3.8–10.5)

## 2024-12-02 NOTE — H&P ADULT - CLICK TO LAUNCH ORM
EPWORTH SLEEPINESS SCALE KEY:     Scale:   0=No chance of dozing  1=slight chance of dozing  2=moderate chance of dozing  3=high vani of dozing    EPWORTH SCALE  Sitting and reading -3  Watching TV -3  Sitting inactive in a public place -3  As a passenger in a care for one hour without a break -1  Lying down to rest in the afternoon when circumstances permit -3  Sitting and talking to someone -0  Sitting quietly after lunch without alcohol -3  In a car, while stopped for a few minutes in traffic -3  TOTAL -19  neck circ - 13in    Score 1-6: good sleep  Score 7-8: average sleep  Score 9 and up: seek advise of a sleep specialist without delay.                                                                                                          .

## 2024-12-09 ENCOUNTER — APPOINTMENT (OUTPATIENT)
Dept: HEMATOLOGY ONCOLOGY | Facility: CLINIC | Age: 69
End: 2024-12-09
Payer: MEDICARE

## 2024-12-09 ENCOUNTER — RESULT REVIEW (OUTPATIENT)
Age: 69
End: 2024-12-09

## 2024-12-09 VITALS
BODY MASS INDEX: 23.67 KG/M2 | WEIGHT: 147.27 LBS | HEART RATE: 90 BPM | SYSTOLIC BLOOD PRESSURE: 112 MMHG | HEIGHT: 66 IN | OXYGEN SATURATION: 98 % | DIASTOLIC BLOOD PRESSURE: 76 MMHG

## 2024-12-09 LAB
BASOPHILS # BLD AUTO: 0.1 K/UL — SIGNIFICANT CHANGE UP (ref 0–0.2)
BASOPHILS NFR BLD AUTO: 0.9 % — SIGNIFICANT CHANGE UP (ref 0–2)
EOSINOPHIL # BLD AUTO: 0 K/UL — SIGNIFICANT CHANGE UP (ref 0–0.5)
EOSINOPHIL NFR BLD AUTO: 0.5 % — SIGNIFICANT CHANGE UP (ref 0–6)
HCT VFR BLD CALC: 40.9 % — SIGNIFICANT CHANGE UP (ref 39–50)
HGB BLD-MCNC: 13.9 G/DL — SIGNIFICANT CHANGE UP (ref 13–17)
LYMPHOCYTES # BLD AUTO: 2 K/UL — SIGNIFICANT CHANGE UP (ref 1–3.3)
LYMPHOCYTES # BLD AUTO: 25.9 % — SIGNIFICANT CHANGE UP (ref 13–44)
MCHC RBC-ENTMCNC: 28.6 PG — SIGNIFICANT CHANGE UP (ref 27–34)
MCHC RBC-ENTMCNC: 34 G/DL — SIGNIFICANT CHANGE UP (ref 32–36)
MCV RBC AUTO: 83.9 FL — SIGNIFICANT CHANGE UP (ref 80–100)
MONOCYTES # BLD AUTO: 0.6 K/UL — SIGNIFICANT CHANGE UP (ref 0–0.9)
MONOCYTES NFR BLD AUTO: 8.3 % — SIGNIFICANT CHANGE UP (ref 2–14)
NEUTROPHILS # BLD AUTO: 4.9 K/UL — SIGNIFICANT CHANGE UP (ref 1.8–7.4)
NEUTROPHILS NFR BLD AUTO: 64.5 % — SIGNIFICANT CHANGE UP (ref 43–77)
PLATELET # BLD AUTO: 242 K/UL — SIGNIFICANT CHANGE UP (ref 150–400)
RBC # BLD: 4.87 M/UL — SIGNIFICANT CHANGE UP (ref 4.2–5.8)
RBC # FLD: 15.8 % — HIGH (ref 10.3–14.5)
WBC # BLD: 7.7 K/UL — SIGNIFICANT CHANGE UP (ref 3.8–10.5)
WBC # FLD AUTO: 7.7 K/UL — SIGNIFICANT CHANGE UP (ref 3.8–10.5)

## 2024-12-09 PROCEDURE — 99214 OFFICE O/P EST MOD 30 MIN: CPT

## 2024-12-10 ENCOUNTER — APPOINTMENT (OUTPATIENT)
Age: 69
End: 2024-12-10

## 2024-12-10 ENCOUNTER — RESULT REVIEW (OUTPATIENT)
Age: 69
End: 2024-12-10

## 2024-12-11 DIAGNOSIS — Z51.11 ENCOUNTER FOR ANTINEOPLASTIC CHEMOTHERAPY: ICD-10-CM

## 2024-12-11 LAB
ALBUMIN SERPL ELPH-MCNC: 3.9 G/DL — SIGNIFICANT CHANGE UP (ref 3.3–5)
ALP SERPL-CCNC: 98 U/L — SIGNIFICANT CHANGE UP (ref 40–120)
ALT FLD-CCNC: 15 U/L — SIGNIFICANT CHANGE UP (ref 10–45)
ANION GAP SERPL CALC-SCNC: 15 MMOL/L — SIGNIFICANT CHANGE UP (ref 5–17)
AST SERPL-CCNC: 17 U/L — SIGNIFICANT CHANGE UP (ref 10–40)
BILIRUB SERPL-MCNC: 0.3 MG/DL — SIGNIFICANT CHANGE UP (ref 0.2–1.2)
BUN SERPL-MCNC: 20 MG/DL — SIGNIFICANT CHANGE UP (ref 7–23)
CALCIUM SERPL-MCNC: 9.5 MG/DL — SIGNIFICANT CHANGE UP (ref 8.4–10.5)
CHLORIDE SERPL-SCNC: 102 MMOL/L — SIGNIFICANT CHANGE UP (ref 96–108)
CO2 SERPL-SCNC: 21 MMOL/L — LOW (ref 22–31)
CREAT SERPL-MCNC: 1.01 MG/DL — SIGNIFICANT CHANGE UP (ref 0.5–1.3)
EGFR: 82 ML/MIN/1.73M2 — SIGNIFICANT CHANGE UP
GLUCOSE SERPL-MCNC: 256 MG/DL — HIGH (ref 70–99)
MAGNESIUM SERPL-MCNC: 2.3 MG/DL — SIGNIFICANT CHANGE UP (ref 1.6–2.6)
POTASSIUM SERPL-MCNC: 4.3 MMOL/L — SIGNIFICANT CHANGE UP (ref 3.5–5.3)
POTASSIUM SERPL-SCNC: 4.3 MMOL/L — SIGNIFICANT CHANGE UP (ref 3.5–5.3)
PROT SERPL-MCNC: 7.7 G/DL — SIGNIFICANT CHANGE UP (ref 6–8.3)
SODIUM SERPL-SCNC: 137 MMOL/L — SIGNIFICANT CHANGE UP (ref 135–145)

## 2024-12-16 ENCOUNTER — RESULT REVIEW (OUTPATIENT)
Age: 69
End: 2024-12-16

## 2024-12-16 ENCOUNTER — APPOINTMENT (OUTPATIENT)
Dept: HEMATOLOGY ONCOLOGY | Facility: CLINIC | Age: 69
End: 2024-12-16

## 2024-12-16 LAB
ALBUMIN SERPL ELPH-MCNC: 4 G/DL
ALP BLD-CCNC: 109 U/L
ALT SERPL-CCNC: 15 U/L
ANION GAP SERPL CALC-SCNC: 16 MMOL/L
AST SERPL-CCNC: 18 U/L
BASOPHILS # BLD AUTO: 0.1 K/UL — SIGNIFICANT CHANGE UP (ref 0–0.2)
BASOPHILS NFR BLD AUTO: 1 % — SIGNIFICANT CHANGE UP (ref 0–2)
BILIRUB SERPL-MCNC: 0.4 MG/DL
BUN SERPL-MCNC: 25 MG/DL
CALCIUM SERPL-MCNC: 9.5 MG/DL
CHLORIDE SERPL-SCNC: 100 MMOL/L
CO2 SERPL-SCNC: 22 MMOL/L
CREAT SERPL-MCNC: 1.05 MG/DL
EGFR: 78 ML/MIN/1.73M2
EOSINOPHIL # BLD AUTO: 0.1 K/UL — SIGNIFICANT CHANGE UP (ref 0–0.5)
EOSINOPHIL NFR BLD AUTO: 1 % — SIGNIFICANT CHANGE UP (ref 0–6)
GLUCOSE SERPL-MCNC: 143 MG/DL
HCT VFR BLD CALC: 37.7 % — LOW (ref 39–50)
HGB BLD-MCNC: 12.1 G/DL — LOW (ref 13–17)
LYMPHOCYTES # BLD AUTO: 1.7 K/UL — SIGNIFICANT CHANGE UP (ref 1–3.3)
LYMPHOCYTES # BLD AUTO: 27.2 % — SIGNIFICANT CHANGE UP (ref 13–44)
MAGNESIUM SERPL-MCNC: 1.8 MG/DL
MCHC RBC-ENTMCNC: 28.1 PG — SIGNIFICANT CHANGE UP (ref 27–34)
MCHC RBC-ENTMCNC: 32.2 G/DL — SIGNIFICANT CHANGE UP (ref 32–36)
MCV RBC AUTO: 87.3 FL — SIGNIFICANT CHANGE UP (ref 80–100)
MONOCYTES # BLD AUTO: 0.5 K/UL — SIGNIFICANT CHANGE UP (ref 0–0.9)
MONOCYTES NFR BLD AUTO: 7.2 % — SIGNIFICANT CHANGE UP (ref 2–14)
NEUTROPHILS # BLD AUTO: 4 K/UL — SIGNIFICANT CHANGE UP (ref 1.8–7.4)
NEUTROPHILS NFR BLD AUTO: 63.6 % — SIGNIFICANT CHANGE UP (ref 43–77)
PLATELET # BLD AUTO: 196 K/UL — SIGNIFICANT CHANGE UP (ref 150–400)
POTASSIUM SERPL-SCNC: 4.2 MMOL/L
PROT SERPL-MCNC: 6.9 G/DL
RBC # BLD: 4.32 M/UL — SIGNIFICANT CHANGE UP (ref 4.2–5.8)
RBC # FLD: 15.8 % — HIGH (ref 10.3–14.5)
SODIUM SERPL-SCNC: 137 MMOL/L
WBC # BLD: 6.3 K/UL — SIGNIFICANT CHANGE UP (ref 3.8–10.5)
WBC # FLD AUTO: 6.3 K/UL — SIGNIFICANT CHANGE UP (ref 3.8–10.5)

## 2024-12-17 ENCOUNTER — APPOINTMENT (OUTPATIENT)
Age: 69
End: 2024-12-17

## 2024-12-18 DIAGNOSIS — R11.2 NAUSEA WITH VOMITING, UNSPECIFIED: ICD-10-CM

## 2024-12-23 ENCOUNTER — APPOINTMENT (OUTPATIENT)
Dept: HEMATOLOGY ONCOLOGY | Facility: CLINIC | Age: 69
End: 2024-12-23

## 2024-12-23 ENCOUNTER — RESULT REVIEW (OUTPATIENT)
Age: 69
End: 2024-12-23

## 2024-12-23 LAB
ALBUMIN SERPL ELPH-MCNC: 4 G/DL
ALP BLD-CCNC: 119 U/L
ALT SERPL-CCNC: 15 U/L
ANION GAP SERPL CALC-SCNC: 15 MMOL/L
AST SERPL-CCNC: 21 U/L
BASOPHILS # BLD AUTO: 0 K/UL — SIGNIFICANT CHANGE UP (ref 0–0.2)
BASOPHILS NFR BLD AUTO: 1 % — SIGNIFICANT CHANGE UP (ref 0–2)
BILIRUB SERPL-MCNC: 0.5 MG/DL
BUN SERPL-MCNC: 14 MG/DL
CALCIUM SERPL-MCNC: 9.4 MG/DL
CHLORIDE SERPL-SCNC: 105 MMOL/L
CO2 SERPL-SCNC: 20 MMOL/L
CREAT SERPL-MCNC: 0.96 MG/DL
EGFR: 87 ML/MIN/1.73M2
EOSINOPHIL # BLD AUTO: 0.2 K/UL — SIGNIFICANT CHANGE UP (ref 0–0.5)
EOSINOPHIL NFR BLD AUTO: 3.1 % — SIGNIFICANT CHANGE UP (ref 0–6)
GLUCOSE SERPL-MCNC: 156 MG/DL
HCT VFR BLD CALC: 33.6 % — LOW (ref 39–50)
HGB BLD-MCNC: 11.3 G/DL — LOW (ref 13–17)
LYMPHOCYTES # BLD AUTO: 1.3 K/UL — SIGNIFICANT CHANGE UP (ref 1–3.3)
LYMPHOCYTES # BLD AUTO: 25.8 % — SIGNIFICANT CHANGE UP (ref 13–44)
MAGNESIUM SERPL-MCNC: 1.9 MG/DL
MCHC RBC-ENTMCNC: 28.6 PG — SIGNIFICANT CHANGE UP (ref 27–34)
MCHC RBC-ENTMCNC: 33.8 G/DL — SIGNIFICANT CHANGE UP (ref 32–36)
MCV RBC AUTO: 84.8 FL — SIGNIFICANT CHANGE UP (ref 80–100)
MONOCYTES # BLD AUTO: 0.6 K/UL — SIGNIFICANT CHANGE UP (ref 0–0.9)
MONOCYTES NFR BLD AUTO: 12.2 % — SIGNIFICANT CHANGE UP (ref 2–14)
NEUTROPHILS # BLD AUTO: 2.9 K/UL — SIGNIFICANT CHANGE UP (ref 1.8–7.4)
NEUTROPHILS NFR BLD AUTO: 57.9 % — SIGNIFICANT CHANGE UP (ref 43–77)
PLATELET # BLD AUTO: 326 K/UL — SIGNIFICANT CHANGE UP (ref 150–400)
POTASSIUM SERPL-SCNC: 4 MMOL/L
PROT SERPL-MCNC: 7.2 G/DL
RBC # BLD: 3.96 M/UL — LOW (ref 4.2–5.8)
RBC # FLD: 15.1 % — HIGH (ref 10.3–14.5)
SODIUM SERPL-SCNC: 139 MMOL/L
WBC # BLD: 5 K/UL — SIGNIFICANT CHANGE UP (ref 3.8–10.5)
WBC # FLD AUTO: 5 K/UL — SIGNIFICANT CHANGE UP (ref 3.8–10.5)

## 2024-12-24 ENCOUNTER — APPOINTMENT (OUTPATIENT)
Age: 69
End: 2024-12-24

## 2025-01-06 ENCOUNTER — APPOINTMENT (OUTPATIENT)
Dept: HEMATOLOGY ONCOLOGY | Facility: CLINIC | Age: 70
End: 2025-01-06

## 2025-01-07 ENCOUNTER — APPOINTMENT (OUTPATIENT)
Age: 70
End: 2025-01-07

## 2025-01-13 ENCOUNTER — RESULT REVIEW (OUTPATIENT)
Age: 70
End: 2025-01-13

## 2025-01-13 ENCOUNTER — APPOINTMENT (OUTPATIENT)
Dept: HEMATOLOGY ONCOLOGY | Facility: CLINIC | Age: 70
End: 2025-01-13

## 2025-01-13 LAB
BASOPHILS # BLD AUTO: 0.1 K/UL — SIGNIFICANT CHANGE UP (ref 0–0.2)
BASOPHILS NFR BLD AUTO: 0.7 % — SIGNIFICANT CHANGE UP (ref 0–2)
EOSINOPHIL # BLD AUTO: 0.1 K/UL — SIGNIFICANT CHANGE UP (ref 0–0.5)
EOSINOPHIL NFR BLD AUTO: 0.7 % — SIGNIFICANT CHANGE UP (ref 0–6)
HCT VFR BLD CALC: 33.4 % — LOW (ref 39–50)
HGB BLD-MCNC: 11 G/DL — LOW (ref 13–17)
LYMPHOCYTES # BLD AUTO: 1.6 K/UL — SIGNIFICANT CHANGE UP (ref 1–3.3)
LYMPHOCYTES # BLD AUTO: 13.9 % — SIGNIFICANT CHANGE UP (ref 13–44)
MCHC RBC-ENTMCNC: 27.6 PG — SIGNIFICANT CHANGE UP (ref 27–34)
MCHC RBC-ENTMCNC: 32.9 G/DL — SIGNIFICANT CHANGE UP (ref 32–36)
MCV RBC AUTO: 83.9 FL — SIGNIFICANT CHANGE UP (ref 80–100)
MONOCYTES # BLD AUTO: 1 K/UL — HIGH (ref 0–0.9)
MONOCYTES NFR BLD AUTO: 8.5 % — SIGNIFICANT CHANGE UP (ref 2–14)
NEUTROPHILS # BLD AUTO: 8.6 K/UL — HIGH (ref 1.8–7.4)
NEUTROPHILS NFR BLD AUTO: 76.2 % — SIGNIFICANT CHANGE UP (ref 43–77)
PLATELET # BLD AUTO: 382 K/UL — SIGNIFICANT CHANGE UP (ref 150–400)
RBC # BLD: 3.98 M/UL — LOW (ref 4.2–5.8)
RBC # FLD: 16.7 % — HIGH (ref 10.3–14.5)
WBC # BLD: 11.3 K/UL — HIGH (ref 3.8–10.5)
WBC # FLD AUTO: 11.3 K/UL — HIGH (ref 3.8–10.5)

## 2025-01-14 ENCOUNTER — APPOINTMENT (OUTPATIENT)
Age: 70
End: 2025-01-14

## 2025-01-15 ENCOUNTER — NON-APPOINTMENT (OUTPATIENT)
Age: 70
End: 2025-01-15

## 2025-01-21 ENCOUNTER — RESULT REVIEW (OUTPATIENT)
Age: 70
End: 2025-01-21

## 2025-01-21 ENCOUNTER — APPOINTMENT (OUTPATIENT)
Dept: HEMATOLOGY ONCOLOGY | Facility: CLINIC | Age: 70
End: 2025-01-21

## 2025-01-21 LAB
ALBUMIN SERPL ELPH-MCNC: 3.7 G/DL
ALP BLD-CCNC: 114 U/L
ALT SERPL-CCNC: 14 U/L
ANION GAP SERPL CALC-SCNC: 14 MMOL/L
AST SERPL-CCNC: 19 U/L
BASOPHILS # BLD AUTO: 0.1 K/UL — SIGNIFICANT CHANGE UP (ref 0–0.2)
BASOPHILS NFR BLD AUTO: 1 % — SIGNIFICANT CHANGE UP (ref 0–2)
BILIRUB SERPL-MCNC: 0.5 MG/DL
BUN SERPL-MCNC: 19 MG/DL
CALCIUM SERPL-MCNC: 9.2 MG/DL
CHLORIDE SERPL-SCNC: 104 MMOL/L
CO2 SERPL-SCNC: 21 MMOL/L
CREAT SERPL-MCNC: 0.96 MG/DL
EGFR: 87 ML/MIN/1.73M2
EOSINOPHIL # BLD AUTO: 0.3 K/UL — SIGNIFICANT CHANGE UP (ref 0–0.5)
EOSINOPHIL NFR BLD AUTO: 3.3 % — SIGNIFICANT CHANGE UP (ref 0–6)
GLUCOSE SERPL-MCNC: 127 MG/DL
HCT VFR BLD CALC: 32.5 % — LOW (ref 39–50)
HGB BLD-MCNC: 10.8 G/DL — LOW (ref 13–17)
LYMPHOCYTES # BLD AUTO: 1.7 K/UL — SIGNIFICANT CHANGE UP (ref 1–3.3)
LYMPHOCYTES # BLD AUTO: 20.5 % — SIGNIFICANT CHANGE UP (ref 13–44)
MAGNESIUM SERPL-MCNC: 1.8 MG/DL
MCHC RBC-ENTMCNC: 26.9 PG — LOW (ref 27–34)
MCHC RBC-ENTMCNC: 33.1 G/DL — SIGNIFICANT CHANGE UP (ref 32–36)
MCV RBC AUTO: 81.3 FL — SIGNIFICANT CHANGE UP (ref 80–100)
MONOCYTES # BLD AUTO: 0.5 K/UL — SIGNIFICANT CHANGE UP (ref 0–0.9)
MONOCYTES NFR BLD AUTO: 6.4 % — SIGNIFICANT CHANGE UP (ref 2–14)
NEUTROPHILS # BLD AUTO: 5.7 K/UL — SIGNIFICANT CHANGE UP (ref 1.8–7.4)
NEUTROPHILS NFR BLD AUTO: 68.8 % — SIGNIFICANT CHANGE UP (ref 43–77)
PLATELET # BLD AUTO: 310 K/UL — SIGNIFICANT CHANGE UP (ref 150–400)
POTASSIUM SERPL-SCNC: 4.2 MMOL/L
PROT SERPL-MCNC: 6.6 G/DL
RBC # BLD: 4 M/UL — LOW (ref 4.2–5.8)
RBC # FLD: 16.3 % — HIGH (ref 10.3–14.5)
SODIUM SERPL-SCNC: 138 MMOL/L
WBC # BLD: 8.3 K/UL — SIGNIFICANT CHANGE UP (ref 3.8–10.5)
WBC # FLD AUTO: 8.3 K/UL — SIGNIFICANT CHANGE UP (ref 3.8–10.5)

## 2025-01-22 ENCOUNTER — APPOINTMENT (OUTPATIENT)
Age: 70
End: 2025-01-22

## 2025-01-22 ENCOUNTER — APPOINTMENT (OUTPATIENT)
Dept: HEMATOLOGY ONCOLOGY | Facility: CLINIC | Age: 70
End: 2025-01-22
Payer: MEDICARE

## 2025-01-22 DIAGNOSIS — C68.9 MALIGNANT NEOPLASM OF URINARY ORGAN, UNSPECIFIED: ICD-10-CM

## 2025-01-22 PROCEDURE — 99214 OFFICE O/P EST MOD 30 MIN: CPT

## 2025-01-28 ENCOUNTER — APPOINTMENT (OUTPATIENT)
Dept: HEMATOLOGY ONCOLOGY | Facility: CLINIC | Age: 70
End: 2025-01-28

## 2025-01-28 ENCOUNTER — RESULT REVIEW (OUTPATIENT)
Age: 70
End: 2025-01-28

## 2025-01-28 LAB
BASOPHILS # BLD AUTO: 0 K/UL — SIGNIFICANT CHANGE UP (ref 0–0.2)
BASOPHILS NFR BLD AUTO: 0.7 % — SIGNIFICANT CHANGE UP (ref 0–2)
EOSINOPHIL # BLD AUTO: 0.1 K/UL — SIGNIFICANT CHANGE UP (ref 0–0.5)
EOSINOPHIL NFR BLD AUTO: 2 % — SIGNIFICANT CHANGE UP (ref 0–6)
HCT VFR BLD CALC: 32.3 % — LOW (ref 39–50)
HGB BLD-MCNC: 10.2 G/DL — LOW (ref 13–17)
LYMPHOCYTES # BLD AUTO: 1.3 K/UL — SIGNIFICANT CHANGE UP (ref 1–3.3)
LYMPHOCYTES # BLD AUTO: 20.8 % — SIGNIFICANT CHANGE UP (ref 13–44)
MCHC RBC-ENTMCNC: 27.1 PG — SIGNIFICANT CHANGE UP (ref 27–34)
MCHC RBC-ENTMCNC: 31.6 G/DL — LOW (ref 32–36)
MCV RBC AUTO: 85.8 FL — SIGNIFICANT CHANGE UP (ref 80–100)
MONOCYTES # BLD AUTO: 0.5 K/UL — SIGNIFICANT CHANGE UP (ref 0–0.9)
MONOCYTES NFR BLD AUTO: 7.8 % — SIGNIFICANT CHANGE UP (ref 2–14)
NEUTROPHILS # BLD AUTO: 4.3 K/UL — SIGNIFICANT CHANGE UP (ref 1.8–7.4)
NEUTROPHILS NFR BLD AUTO: 68.8 % — SIGNIFICANT CHANGE UP (ref 43–77)
PLATELET # BLD AUTO: 289 K/UL — SIGNIFICANT CHANGE UP (ref 150–400)
RBC # BLD: 3.76 M/UL — LOW (ref 4.2–5.8)
RBC # FLD: 16 % — HIGH (ref 10.3–14.5)
WBC # BLD: 6.3 K/UL — SIGNIFICANT CHANGE UP (ref 3.8–10.5)
WBC # FLD AUTO: 6.3 K/UL — SIGNIFICANT CHANGE UP (ref 3.8–10.5)

## 2025-01-29 ENCOUNTER — APPOINTMENT (OUTPATIENT)
Age: 70
End: 2025-01-29

## 2025-01-29 LAB
ALBUMIN SERPL ELPH-MCNC: 3.5 G/DL
ALP BLD-CCNC: 120 U/L
ALT SERPL-CCNC: 11 U/L
ANION GAP SERPL CALC-SCNC: 15 MMOL/L
AST SERPL-CCNC: 19 U/L
BILIRUB SERPL-MCNC: 0.5 MG/DL
BUN SERPL-MCNC: 17 MG/DL
CALCIUM SERPL-MCNC: 8.8 MG/DL
CHLORIDE SERPL-SCNC: 103 MMOL/L
CO2 SERPL-SCNC: 18 MMOL/L
CREAT SERPL-MCNC: 0.91 MG/DL
EGFR: 92 ML/MIN/1.73M2
GLUCOSE SERPL-MCNC: 212 MG/DL
MAGNESIUM SERPL-MCNC: 2 MG/DL
POTASSIUM SERPL-SCNC: 3.6 MMOL/L
PROT SERPL-MCNC: 6.6 G/DL
SODIUM SERPL-SCNC: 136 MMOL/L

## 2025-01-30 ENCOUNTER — OUTPATIENT (OUTPATIENT)
Dept: OUTPATIENT SERVICES | Facility: HOSPITAL | Age: 70
LOS: 1 days | Discharge: HOME | End: 2025-01-30

## 2025-01-30 DIAGNOSIS — R18.0 MALIGNANT ASCITES: ICD-10-CM

## 2025-01-30 DIAGNOSIS — C78.6 SECONDARY MALIGNANT NEOPLASM OF RETROPERITONEUM AND PERITONEUM: ICD-10-CM

## 2025-01-30 DIAGNOSIS — Z98.890 OTHER SPECIFIED POSTPROCEDURAL STATES: Chronic | ICD-10-CM

## 2025-01-30 DIAGNOSIS — Z90.49 ACQUIRED ABSENCE OF OTHER SPECIFIED PARTS OF DIGESTIVE TRACT: Chronic | ICD-10-CM

## 2025-01-30 DIAGNOSIS — C68.1: ICD-10-CM

## 2025-02-03 ENCOUNTER — RESULT REVIEW (OUTPATIENT)
Age: 70
End: 2025-02-03

## 2025-02-03 ENCOUNTER — APPOINTMENT (OUTPATIENT)
Dept: HEMATOLOGY ONCOLOGY | Facility: CLINIC | Age: 70
End: 2025-02-03

## 2025-02-03 LAB
ALBUMIN SERPL ELPH-MCNC: 3.5 G/DL
ALP BLD-CCNC: 130 U/L
ALT SERPL-CCNC: 14 U/L
ANION GAP SERPL CALC-SCNC: 14 MMOL/L
AST SERPL-CCNC: 19 U/L
BASOPHILS # BLD AUTO: 0.1 K/UL — SIGNIFICANT CHANGE UP (ref 0–0.2)
BASOPHILS NFR BLD AUTO: 1.2 % — SIGNIFICANT CHANGE UP (ref 0–2)
BILIRUB SERPL-MCNC: 0.6 MG/DL
BUN SERPL-MCNC: 11 MG/DL
CALCIUM SERPL-MCNC: 9.1 MG/DL
CHLORIDE SERPL-SCNC: 103 MMOL/L
CO2 SERPL-SCNC: 21 MMOL/L
CREAT SERPL-MCNC: 0.99 MG/DL
EGFR: 83 ML/MIN/1.73M2
EOSINOPHIL # BLD AUTO: 0.1 K/UL — SIGNIFICANT CHANGE UP (ref 0–0.5)
EOSINOPHIL NFR BLD AUTO: 2.4 % — SIGNIFICANT CHANGE UP (ref 0–6)
GLUCOSE SERPL-MCNC: 140 MG/DL
HCT VFR BLD CALC: 32.2 % — LOW (ref 39–50)
HGB BLD-MCNC: 10.3 G/DL — LOW (ref 13–17)
LYMPHOCYTES # BLD AUTO: 1.2 K/UL — SIGNIFICANT CHANGE UP (ref 1–3.3)
MAGNESIUM SERPL-MCNC: 2 MG/DL
MCHC RBC-ENTMCNC: 27.1 PG — SIGNIFICANT CHANGE UP (ref 27–34)
MCHC RBC-ENTMCNC: 31.9 G/DL — LOW (ref 32–36)
MCV RBC AUTO: 84.9 FL — SIGNIFICANT CHANGE UP (ref 80–100)
MONOCYTES # BLD AUTO: 0.5 K/UL — SIGNIFICANT CHANGE UP (ref 0–0.9)
MONOCYTES NFR BLD AUTO: 8.6 % — SIGNIFICANT CHANGE UP (ref 2–14)
NEUTROPHILS # BLD AUTO: 3.8 K/UL — SIGNIFICANT CHANGE UP (ref 1.8–7.4)
NEUTROPHILS NFR BLD AUTO: 66.6 % — SIGNIFICANT CHANGE UP (ref 43–77)
PLATELET # BLD AUTO: 407 K/UL — HIGH (ref 150–400)
POTASSIUM SERPL-SCNC: 4 MMOL/L
PROT SERPL-MCNC: 6.9 G/DL
RBC # BLD: 3.79 M/UL — LOW (ref 4.2–5.8)
RBC # FLD: 15.8 % — HIGH (ref 10.3–14.5)
SODIUM SERPL-SCNC: 137 MMOL/L
WBC # BLD: 5.7 K/UL — SIGNIFICANT CHANGE UP (ref 3.8–10.5)
WBC # FLD AUTO: 5.7 K/UL — SIGNIFICANT CHANGE UP (ref 3.8–10.5)

## 2025-02-04 ENCOUNTER — APPOINTMENT (OUTPATIENT)
Age: 70
End: 2025-02-04

## 2025-02-05 DIAGNOSIS — Z51.11 ENCOUNTER FOR ANTINEOPLASTIC CHEMOTHERAPY: ICD-10-CM

## 2025-02-05 DIAGNOSIS — R11.2 NAUSEA WITH VOMITING, UNSPECIFIED: ICD-10-CM

## 2025-02-07 ENCOUNTER — RESULT REVIEW (OUTPATIENT)
Age: 70
End: 2025-02-07

## 2025-02-10 ENCOUNTER — APPOINTMENT (OUTPATIENT)
Dept: HEMATOLOGY ONCOLOGY | Facility: CLINIC | Age: 70
End: 2025-02-10

## 2025-02-14 ENCOUNTER — RESULT REVIEW (OUTPATIENT)
Age: 70
End: 2025-02-14

## 2025-02-14 ENCOUNTER — APPOINTMENT (OUTPATIENT)
Dept: HEMATOLOGY ONCOLOGY | Facility: CLINIC | Age: 70
End: 2025-02-14

## 2025-02-14 LAB
ALBUMIN SERPL ELPH-MCNC: 3.8 G/DL
ALP BLD-CCNC: 120 U/L
ALT SERPL-CCNC: 8 U/L
ANION GAP SERPL CALC-SCNC: 15 MMOL/L
AST SERPL-CCNC: 16 U/L
BASOPHILS # BLD AUTO: 0.02 K/UL — SIGNIFICANT CHANGE UP (ref 0–0.2)
BASOPHILS NFR BLD AUTO: 0.3 % — SIGNIFICANT CHANGE UP (ref 0–2)
BILIRUB SERPL-MCNC: 0.5 MG/DL
BUN SERPL-MCNC: 15 MG/DL
CALCIUM SERPL-MCNC: 9.6 MG/DL
CHLORIDE SERPL-SCNC: 101 MMOL/L
CO2 SERPL-SCNC: 20 MMOL/L
CREAT SERPL-MCNC: 1.01 MG/DL
EGFR: 82 ML/MIN/1.73M2
EOSINOPHIL # BLD AUTO: 0 K/UL — SIGNIFICANT CHANGE UP (ref 0–0.5)
EOSINOPHIL NFR BLD AUTO: 0 % — SIGNIFICANT CHANGE UP (ref 0–6)
GLUCOSE SERPL-MCNC: 140 MG/DL
HCT VFR BLD CALC: 32.9 % — LOW (ref 39–50)
IMM GRANULOCYTES # BLD AUTO: 0.06 K/UL — SIGNIFICANT CHANGE UP (ref 0–0.07)
IMM GRANULOCYTES NFR BLD AUTO: 0.9 % — SIGNIFICANT CHANGE UP (ref 0–0.9)
LYMPHOCYTES # BLD AUTO: 1.4 K/UL — SIGNIFICANT CHANGE UP (ref 1–3.3)
LYMPHOCYTES NFR BLD AUTO: 21.7 % — SIGNIFICANT CHANGE UP (ref 13–44)
MAGNESIUM SERPL-MCNC: 1.9 MG/DL
MCHC RBC-ENTMCNC: 25.8 PG — LOW (ref 27–34)
MCHC RBC-ENTMCNC: 30.7 G/DL — LOW (ref 32–36)
MCV RBC AUTO: 84.1 FL — SIGNIFICANT CHANGE UP (ref 80–100)
MONOCYTES # BLD AUTO: 0.7 K/UL — SIGNIFICANT CHANGE UP (ref 0–0.9)
MONOCYTES NFR BLD AUTO: 10.8 % — SIGNIFICANT CHANGE UP (ref 2–14)
NEUTROPHILS # BLD AUTO: 4.28 K/UL — SIGNIFICANT CHANGE UP (ref 1.8–7.4)
NEUTROPHILS NFR BLD AUTO: 66.3 % — SIGNIFICANT CHANGE UP (ref 43–77)
NRBC # BLD AUTO: 0.04 K/UL — HIGH (ref 0–0)
NRBC # FLD: 0.04 K/UL — HIGH (ref 0–0)
NRBC BLD AUTO-RTO: 0 /100 WBCS — SIGNIFICANT CHANGE UP (ref 0–0)
PLATELET # BLD AUTO: 444 K/UL — HIGH (ref 150–400)
PMV BLD: 8.9 FL — SIGNIFICANT CHANGE UP (ref 7–13)
POTASSIUM SERPL-SCNC: 4.4 MMOL/L
PROT SERPL-MCNC: 7.6 G/DL
RBC # BLD: 3.91 M/UL — LOW (ref 4.2–5.8)
RBC # FLD: 16.9 % — HIGH (ref 10.3–14.5)
SODIUM SERPL-SCNC: 136 MMOL/L
WBC # BLD: 6.46 K/UL — SIGNIFICANT CHANGE UP (ref 3.8–10.5)
WBC # FLD AUTO: 6.46 K/UL — SIGNIFICANT CHANGE UP (ref 3.8–10.5)

## 2025-02-15 ENCOUNTER — APPOINTMENT (OUTPATIENT)
Dept: CT IMAGING | Facility: CLINIC | Age: 70
End: 2025-02-15
Payer: MEDICARE

## 2025-02-15 ENCOUNTER — OUTPATIENT (OUTPATIENT)
Dept: OUTPATIENT SERVICES | Facility: HOSPITAL | Age: 70
LOS: 1 days | End: 2025-02-15

## 2025-02-15 DIAGNOSIS — C68.9 MALIGNANT NEOPLASM OF URINARY ORGAN, UNSPECIFIED: ICD-10-CM

## 2025-02-15 DIAGNOSIS — Z90.49 ACQUIRED ABSENCE OF OTHER SPECIFIED PARTS OF DIGESTIVE TRACT: Chronic | ICD-10-CM

## 2025-02-15 DIAGNOSIS — Z98.890 OTHER SPECIFIED POSTPROCEDURAL STATES: Chronic | ICD-10-CM

## 2025-02-15 PROCEDURE — 71260 CT THORAX DX C+: CPT | Mod: 26

## 2025-02-15 PROCEDURE — 74177 CT ABD & PELVIS W/CONTRAST: CPT | Mod: 26

## 2025-02-18 ENCOUNTER — APPOINTMENT (OUTPATIENT)
Age: 70
End: 2025-02-18

## 2025-02-18 ENCOUNTER — RESULT REVIEW (OUTPATIENT)
Age: 70
End: 2025-02-18

## 2025-02-18 LAB
BASOPHILS # BLD AUTO: 0.03 K/UL — SIGNIFICANT CHANGE UP (ref 0–0.2)
BASOPHILS NFR BLD AUTO: 0.4 % — SIGNIFICANT CHANGE UP (ref 0–2)
EOSINOPHIL NFR BLD AUTO: 0.1 % — SIGNIFICANT CHANGE UP (ref 0–6)
HCT VFR BLD CALC: 32.7 % — LOW (ref 39–50)
HGB BLD-MCNC: 9.9 G/DL — LOW (ref 13–17)
IMM GRANULOCYTES # BLD AUTO: 0.05 K/UL — SIGNIFICANT CHANGE UP (ref 0–0.07)
IMM GRANULOCYTES NFR BLD AUTO: 0.6 % — SIGNIFICANT CHANGE UP (ref 0–0.9)
LYMPHOCYTES # BLD AUTO: 1.79 K/UL — SIGNIFICANT CHANGE UP (ref 1–3.3)
LYMPHOCYTES NFR BLD AUTO: 22.3 % — SIGNIFICANT CHANGE UP (ref 13–44)
MCHC RBC-ENTMCNC: 25.6 PG — LOW (ref 27–34)
MCHC RBC-ENTMCNC: 30.3 G/DL — LOW (ref 32–36)
MCV RBC AUTO: 84.5 FL — SIGNIFICANT CHANGE UP (ref 80–100)
MONOCYTES # BLD AUTO: 0.88 K/UL — SIGNIFICANT CHANGE UP (ref 0–0.9)
MONOCYTES NFR BLD AUTO: 11 % — SIGNIFICANT CHANGE UP (ref 2–14)
NEUTROPHILS # BLD AUTO: 5.25 K/UL — SIGNIFICANT CHANGE UP (ref 1.8–7.4)
NEUTROPHILS NFR BLD AUTO: 65.6 % — SIGNIFICANT CHANGE UP (ref 43–77)
NRBC # BLD AUTO: 0 K/UL — SIGNIFICANT CHANGE UP (ref 0–0)
NRBC # FLD: 0 K/UL — SIGNIFICANT CHANGE UP (ref 0–0)
NRBC BLD AUTO-RTO: 0 /100 WBCS — SIGNIFICANT CHANGE UP (ref 0–0)
PMV BLD: 8.9 FL — SIGNIFICANT CHANGE UP (ref 7–13)
RBC # BLD: 3.87 M/UL — LOW (ref 4.2–5.8)
RBC # FLD: 17 % — HIGH (ref 10.3–14.5)
WBC # BLD: 8.01 K/UL — SIGNIFICANT CHANGE UP (ref 3.8–10.5)
WBC # FLD AUTO: 8.01 K/UL — SIGNIFICANT CHANGE UP (ref 3.8–10.5)

## 2025-02-24 ENCOUNTER — RESULT REVIEW (OUTPATIENT)
Age: 70
End: 2025-02-24

## 2025-02-24 ENCOUNTER — APPOINTMENT (OUTPATIENT)
Dept: HEMATOLOGY ONCOLOGY | Facility: CLINIC | Age: 70
End: 2025-02-24

## 2025-02-24 LAB
ALBUMIN SERPL ELPH-MCNC: 3.7 G/DL
ALP BLD-CCNC: 97 U/L
ALT SERPL-CCNC: 9 U/L
ANION GAP SERPL CALC-SCNC: 13 MMOL/L
AST SERPL-CCNC: 14 U/L
BASOPHILS # BLD AUTO: 0.04 K/UL — SIGNIFICANT CHANGE UP (ref 0–0.2)
BASOPHILS NFR BLD AUTO: 0.4 % — SIGNIFICANT CHANGE UP (ref 0–2)
BILIRUB SERPL-MCNC: 0.6 MG/DL
BUN SERPL-MCNC: 19 MG/DL
CALCIUM SERPL-MCNC: 9.4 MG/DL
CHLORIDE SERPL-SCNC: 102 MMOL/L
CO2 SERPL-SCNC: 21 MMOL/L
CREAT SERPL-MCNC: 0.92 MG/DL
EGFR: 91 ML/MIN/1.73M2
EOSINOPHIL # BLD AUTO: 0.02 K/UL — SIGNIFICANT CHANGE UP (ref 0–0.5)
GLUCOSE SERPL-MCNC: 123 MG/DL
HCT VFR BLD CALC: 32.4 % — LOW (ref 39–50)
HGB BLD-MCNC: 9.9 G/DL — LOW (ref 13–17)
IMM GRANULOCYTES # BLD AUTO: 0.05 K/UL — SIGNIFICANT CHANGE UP (ref 0–0.07)
IMM GRANULOCYTES NFR BLD AUTO: 0.5 % — SIGNIFICANT CHANGE UP (ref 0–0.9)
LYMPHOCYTES # BLD AUTO: 1.65 K/UL — SIGNIFICANT CHANGE UP (ref 1–3.3)
LYMPHOCYTES NFR BLD AUTO: 18.1 % — SIGNIFICANT CHANGE UP (ref 13–44)
MAGNESIUM SERPL-MCNC: 1.9 MG/DL
MCHC RBC-ENTMCNC: 25.4 PG — LOW (ref 27–34)
MCHC RBC-ENTMCNC: 30.6 G/DL — LOW (ref 32–36)
MCV RBC AUTO: 83.3 FL — SIGNIFICANT CHANGE UP (ref 80–100)
MONOCYTES # BLD AUTO: 0.81 K/UL — SIGNIFICANT CHANGE UP (ref 0–0.9)
MONOCYTES NFR BLD AUTO: 8.9 % — SIGNIFICANT CHANGE UP (ref 2–14)
NEUTROPHILS # BLD AUTO: 6.57 K/UL — SIGNIFICANT CHANGE UP (ref 1.8–7.4)
NEUTROPHILS NFR BLD AUTO: 71.9 % — SIGNIFICANT CHANGE UP (ref 43–77)
NRBC # BLD AUTO: 0 K/UL — SIGNIFICANT CHANGE UP (ref 0–0)
NRBC # FLD: 0 K/UL — SIGNIFICANT CHANGE UP (ref 0–0)
NRBC BLD AUTO-RTO: 0 /100 WBCS — SIGNIFICANT CHANGE UP (ref 0–0)
PLATELET # BLD AUTO: 405 K/UL — HIGH (ref 150–400)
PMV BLD: 9 FL — SIGNIFICANT CHANGE UP (ref 7–13)
POTASSIUM SERPL-SCNC: 4.1 MMOL/L
PROT SERPL-MCNC: 7.2 G/DL
RBC # BLD: 3.89 M/UL — LOW (ref 4.2–5.8)
RBC # FLD: 17.2 % — HIGH (ref 10.3–14.5)
SODIUM SERPL-SCNC: 135 MMOL/L
WBC # BLD: 9.14 K/UL — SIGNIFICANT CHANGE UP (ref 3.8–10.5)
WBC # FLD AUTO: 9.14 K/UL — SIGNIFICANT CHANGE UP (ref 3.8–10.5)

## 2025-02-25 ENCOUNTER — NON-APPOINTMENT (OUTPATIENT)
Age: 70
End: 2025-02-25

## 2025-02-25 ENCOUNTER — APPOINTMENT (OUTPATIENT)
Dept: HEMATOLOGY ONCOLOGY | Facility: CLINIC | Age: 70
End: 2025-02-25
Payer: MEDICARE

## 2025-02-25 ENCOUNTER — APPOINTMENT (OUTPATIENT)
Age: 70
End: 2025-02-25

## 2025-02-25 VITALS
SYSTOLIC BLOOD PRESSURE: 114 MMHG | DIASTOLIC BLOOD PRESSURE: 84 MMHG | OXYGEN SATURATION: 99 % | TEMPERATURE: 97.8 F | BODY MASS INDEX: 22.39 KG/M2 | HEIGHT: 66 IN | HEART RATE: 94 BPM | WEIGHT: 139.33 LBS

## 2025-02-25 DIAGNOSIS — C68.9 MALIGNANT NEOPLASM OF URINARY ORGAN, UNSPECIFIED: ICD-10-CM

## 2025-02-25 DIAGNOSIS — R06.6 HICCOUGH: ICD-10-CM

## 2025-02-25 PROCEDURE — 99214 OFFICE O/P EST MOD 30 MIN: CPT

## 2025-02-25 RX ORDER — BACLOFEN 5 MG/1
5 TABLET ORAL 3 TIMES DAILY
Qty: 90 | Refills: 0 | Status: ACTIVE | COMMUNITY
Start: 2025-02-25 | End: 1900-01-01

## 2025-03-03 ENCOUNTER — RESULT REVIEW (OUTPATIENT)
Age: 70
End: 2025-03-03

## 2025-03-03 ENCOUNTER — APPOINTMENT (OUTPATIENT)
Dept: HEMATOLOGY ONCOLOGY | Facility: CLINIC | Age: 70
End: 2025-03-03

## 2025-03-03 LAB
ALBUMIN SERPL ELPH-MCNC: 3.4 G/DL
ALP BLD-CCNC: 107 U/L
ALT SERPL-CCNC: 14 U/L
ANION GAP SERPL CALC-SCNC: 13 MMOL/L
AST SERPL-CCNC: 18 U/L
BASOPHILS # BLD AUTO: 0.02 K/UL — SIGNIFICANT CHANGE UP (ref 0–0.2)
BASOPHILS NFR BLD AUTO: 0.3 % — SIGNIFICANT CHANGE UP (ref 0–2)
BILIRUB SERPL-MCNC: 0.5 MG/DL
BUN SERPL-MCNC: 13 MG/DL
CALCIUM SERPL-MCNC: 9.1 MG/DL
CHLORIDE SERPL-SCNC: 104 MMOL/L
CO2 SERPL-SCNC: 22 MMOL/L
CREAT SERPL-MCNC: 0.89 MG/DL
EGFR: 94 ML/MIN/1.73M2
EOSINOPHIL # BLD AUTO: 0.04 K/UL — SIGNIFICANT CHANGE UP (ref 0–0.5)
EOSINOPHIL NFR BLD AUTO: 0.7 % — SIGNIFICANT CHANGE UP (ref 0–6)
GLUCOSE SERPL-MCNC: 165 MG/DL
HCT VFR BLD CALC: 29.9 % — LOW (ref 39–50)
HGB BLD-MCNC: 9 G/DL — LOW (ref 13–17)
IMM GRANULOCYTES # BLD AUTO: 0.02 K/UL — SIGNIFICANT CHANGE UP (ref 0–0.07)
IMM GRANULOCYTES NFR BLD AUTO: 0.3 % — SIGNIFICANT CHANGE UP (ref 0–0.9)
LYMPHOCYTES # BLD AUTO: 1.07 K/UL — SIGNIFICANT CHANGE UP (ref 1–3.3)
LYMPHOCYTES NFR BLD AUTO: 17.6 % — SIGNIFICANT CHANGE UP (ref 13–44)
MAGNESIUM SERPL-MCNC: 1.8 MG/DL
MCHC RBC-ENTMCNC: 30.1 G/DL — LOW (ref 32–36)
MCV RBC AUTO: 83.3 FL — SIGNIFICANT CHANGE UP (ref 80–100)
MONOCYTES # BLD AUTO: 0.5 K/UL — SIGNIFICANT CHANGE UP (ref 0–0.9)
MONOCYTES NFR BLD AUTO: 8.2 % — SIGNIFICANT CHANGE UP (ref 2–14)
NEUTROPHILS # BLD AUTO: 4.42 K/UL — SIGNIFICANT CHANGE UP (ref 1.8–7.4)
NEUTROPHILS NFR BLD AUTO: 72.9 % — SIGNIFICANT CHANGE UP (ref 43–77)
NRBC # BLD AUTO: 0 K/UL — SIGNIFICANT CHANGE UP (ref 0–0)
NRBC # FLD: 0 K/UL — SIGNIFICANT CHANGE UP (ref 0–0)
NRBC BLD AUTO-RTO: 0 /100 WBCS — SIGNIFICANT CHANGE UP (ref 0–0)
PLATELET # BLD AUTO: 292 K/UL — SIGNIFICANT CHANGE UP (ref 150–400)
PMV BLD: 9 FL — SIGNIFICANT CHANGE UP (ref 7–13)
POTASSIUM SERPL-SCNC: 3.8 MMOL/L
PROT SERPL-MCNC: 6.7 G/DL
RBC # BLD: 3.59 M/UL — LOW (ref 4.2–5.8)
RBC # FLD: 16.9 % — HIGH (ref 10.3–14.5)
SODIUM SERPL-SCNC: 139 MMOL/L
WBC # BLD: 6.07 K/UL — SIGNIFICANT CHANGE UP (ref 3.8–10.5)
WBC # FLD AUTO: 6.07 K/UL — SIGNIFICANT CHANGE UP (ref 3.8–10.5)

## 2025-03-04 ENCOUNTER — APPOINTMENT (OUTPATIENT)
Age: 70
End: 2025-03-04

## 2025-03-10 ENCOUNTER — APPOINTMENT (OUTPATIENT)
Dept: HEMATOLOGY ONCOLOGY | Facility: CLINIC | Age: 70
End: 2025-03-10

## 2025-03-10 ENCOUNTER — RESULT REVIEW (OUTPATIENT)
Age: 70
End: 2025-03-10

## 2025-03-10 LAB
BASOPHILS # BLD AUTO: 0.02 K/UL — SIGNIFICANT CHANGE UP (ref 0–0.2)
BASOPHILS NFR BLD AUTO: 0.4 % — SIGNIFICANT CHANGE UP (ref 0–2)
EOSINOPHIL # BLD AUTO: 0.12 K/UL — SIGNIFICANT CHANGE UP (ref 0–0.5)
EOSINOPHIL NFR BLD AUTO: 2.5 % — SIGNIFICANT CHANGE UP (ref 0–6)
HCT VFR BLD CALC: 29.7 % — LOW (ref 39–50)
HGB BLD-MCNC: 9.1 G/DL — LOW (ref 13–17)
IMM GRANULOCYTES # BLD AUTO: 0.02 K/UL — SIGNIFICANT CHANGE UP (ref 0–0.07)
LYMPHOCYTES # BLD AUTO: 1.1 K/UL — SIGNIFICANT CHANGE UP (ref 1–3.3)
LYMPHOCYTES NFR BLD AUTO: 22.5 % — SIGNIFICANT CHANGE UP (ref 13–44)
MCHC RBC-ENTMCNC: 25.2 PG — LOW (ref 27–34)
MCHC RBC-ENTMCNC: 30.6 G/DL — LOW (ref 32–36)
MCV RBC AUTO: 82.3 FL — SIGNIFICANT CHANGE UP (ref 80–100)
MONOCYTES # BLD AUTO: 0.56 K/UL — SIGNIFICANT CHANGE UP (ref 0–0.9)
NEUTROPHILS # BLD AUTO: 3.07 K/UL — SIGNIFICANT CHANGE UP (ref 1.8–7.4)
NEUTROPHILS NFR BLD AUTO: 62.7 % — SIGNIFICANT CHANGE UP (ref 43–77)
NRBC # BLD AUTO: 0 K/UL — SIGNIFICANT CHANGE UP (ref 0–0)
NRBC # FLD: 0 K/UL — SIGNIFICANT CHANGE UP (ref 0–0)
NRBC BLD AUTO-RTO: 0 /100 WBCS — SIGNIFICANT CHANGE UP (ref 0–0)
PLATELET # BLD AUTO: 315 K/UL — SIGNIFICANT CHANGE UP (ref 150–400)
PMV BLD: 8.8 FL — SIGNIFICANT CHANGE UP (ref 7–13)
RBC # BLD: 3.61 M/UL — LOW (ref 4.2–5.8)
RBC # FLD: 17.2 % — HIGH (ref 10.3–14.5)
WBC # BLD: 4.89 K/UL — SIGNIFICANT CHANGE UP (ref 3.8–10.5)
WBC # FLD AUTO: 4.89 K/UL — SIGNIFICANT CHANGE UP (ref 3.8–10.5)

## 2025-03-11 ENCOUNTER — NON-APPOINTMENT (OUTPATIENT)
Age: 70
End: 2025-03-11

## 2025-03-11 ENCOUNTER — APPOINTMENT (OUTPATIENT)
Age: 70
End: 2025-03-11

## 2025-03-11 LAB
ALBUMIN SERPL ELPH-MCNC: 3.3 G/DL
ALP BLD-CCNC: 131 U/L
ALT SERPL-CCNC: 13 U/L
ANION GAP SERPL CALC-SCNC: 13 MMOL/L
AST SERPL-CCNC: 24 U/L
BILIRUB SERPL-MCNC: 0.5 MG/DL
BUN SERPL-MCNC: 13 MG/DL
CALCIUM SERPL-MCNC: 8.9 MG/DL
CHLORIDE SERPL-SCNC: 104 MMOL/L
CO2 SERPL-SCNC: 22 MMOL/L
CREAT SERPL-MCNC: 0.9 MG/DL
EGFRCR SERPLBLD CKD-EPI 2021: 94 ML/MIN/1.73M2
GLUCOSE SERPL-MCNC: 170 MG/DL
MAGNESIUM SERPL-MCNC: 1.9 MG/DL
POTASSIUM SERPL-SCNC: 4.1 MMOL/L
PROT SERPL-MCNC: 6.6 G/DL
SODIUM SERPL-SCNC: 139 MMOL/L

## 2025-03-20 ENCOUNTER — OUTPATIENT (OUTPATIENT)
Dept: OUTPATIENT SERVICES | Facility: HOSPITAL | Age: 70
LOS: 1 days | End: 2025-03-20
Payer: MEDICARE

## 2025-03-20 DIAGNOSIS — Z90.49 ACQUIRED ABSENCE OF OTHER SPECIFIED PARTS OF DIGESTIVE TRACT: Chronic | ICD-10-CM

## 2025-03-20 DIAGNOSIS — Z98.890 OTHER SPECIFIED POSTPROCEDURAL STATES: Chronic | ICD-10-CM

## 2025-03-20 DIAGNOSIS — C68.9 MALIGNANT NEOPLASM OF URINARY ORGAN, UNSPECIFIED: ICD-10-CM

## 2025-03-20 PROCEDURE — C1729: CPT

## 2025-03-20 PROCEDURE — 49083 ABD PARACENTESIS W/IMAGING: CPT

## 2025-03-20 PROCEDURE — 49406 IMAGE CATH FLUID PERI/RETRO: CPT

## 2025-03-24 ENCOUNTER — APPOINTMENT (OUTPATIENT)
Dept: HEMATOLOGY ONCOLOGY | Facility: CLINIC | Age: 70
End: 2025-03-24

## 2025-03-25 ENCOUNTER — APPOINTMENT (OUTPATIENT)
Dept: HEMATOLOGY ONCOLOGY | Facility: CLINIC | Age: 70
End: 2025-03-25

## 2025-03-31 ENCOUNTER — RESULT REVIEW (OUTPATIENT)
Age: 70
End: 2025-03-31

## 2025-03-31 ENCOUNTER — APPOINTMENT (OUTPATIENT)
Dept: HEMATOLOGY ONCOLOGY | Facility: CLINIC | Age: 70
End: 2025-03-31

## 2025-03-31 LAB
BASOPHILS # BLD AUTO: 0.04 K/UL — SIGNIFICANT CHANGE UP (ref 0–0.2)
BASOPHILS NFR BLD AUTO: 0.5 % — SIGNIFICANT CHANGE UP (ref 0–2)
EOSINOPHIL # BLD AUTO: 0.03 K/UL — SIGNIFICANT CHANGE UP (ref 0–0.5)
EOSINOPHIL NFR BLD AUTO: 0.3 % — SIGNIFICANT CHANGE UP (ref 0–6)
HCT VFR BLD CALC: 29.7 % — LOW (ref 39–50)
HGB BLD-MCNC: 8.9 G/DL — LOW (ref 13–17)
IMM GRANULOCYTES # BLD AUTO: 0.11 K/UL — HIGH (ref 0–0.07)
IMM GRANULOCYTES NFR BLD AUTO: 1.2 % — HIGH (ref 0–0.9)
LYMPHOCYTES # BLD AUTO: 1.54 K/UL — SIGNIFICANT CHANGE UP (ref 1–3.3)
LYMPHOCYTES NFR BLD AUTO: 17.4 % — SIGNIFICANT CHANGE UP (ref 13–44)
MCHC RBC-ENTMCNC: 23.8 PG — LOW (ref 27–34)
MCHC RBC-ENTMCNC: 30 G/DL — LOW (ref 32–36)
MCV RBC AUTO: 79.4 FL — LOW (ref 80–100)
MONOCYTES # BLD AUTO: 0.86 K/UL — SIGNIFICANT CHANGE UP (ref 0–0.9)
MONOCYTES NFR BLD AUTO: 9.7 % — SIGNIFICANT CHANGE UP (ref 2–14)
NEUTROPHILS # BLD AUTO: 6.27 K/UL — SIGNIFICANT CHANGE UP (ref 1.8–7.4)
NEUTROPHILS NFR BLD AUTO: 70.9 % — SIGNIFICANT CHANGE UP (ref 43–77)
NRBC # BLD AUTO: 0 K/UL — SIGNIFICANT CHANGE UP (ref 0–0)
NRBC # FLD: 0 K/UL — SIGNIFICANT CHANGE UP (ref 0–0)
NRBC BLD AUTO-RTO: 0 /100 WBCS — SIGNIFICANT CHANGE UP (ref 0–0)
PLATELET # BLD AUTO: 427 K/UL — HIGH (ref 150–400)
PMV BLD: 9 FL — SIGNIFICANT CHANGE UP (ref 7–13)
RBC # BLD: 3.74 M/UL — LOW (ref 4.2–5.8)
RBC # FLD: 17.8 % — HIGH (ref 10.3–14.5)
WBC # BLD: 8.85 K/UL — SIGNIFICANT CHANGE UP (ref 3.8–10.5)
WBC # FLD AUTO: 8.85 K/UL — SIGNIFICANT CHANGE UP (ref 3.8–10.5)

## 2025-04-01 ENCOUNTER — APPOINTMENT (OUTPATIENT)
Age: 70
End: 2025-04-01

## 2025-04-01 ENCOUNTER — RESULT REVIEW (OUTPATIENT)
Age: 70
End: 2025-04-01

## 2025-04-07 ENCOUNTER — APPOINTMENT (OUTPATIENT)
Dept: HEMATOLOGY ONCOLOGY | Facility: CLINIC | Age: 70
End: 2025-04-07

## 2025-04-07 ENCOUNTER — RESULT REVIEW (OUTPATIENT)
Age: 70
End: 2025-04-07

## 2025-04-08 ENCOUNTER — APPOINTMENT (OUTPATIENT)
Dept: HEMATOLOGY ONCOLOGY | Facility: CLINIC | Age: 70
End: 2025-04-08
Payer: MEDICARE

## 2025-04-08 ENCOUNTER — RESULT REVIEW (OUTPATIENT)
Age: 70
End: 2025-04-08

## 2025-04-08 ENCOUNTER — APPOINTMENT (OUTPATIENT)
Age: 70
End: 2025-04-08

## 2025-04-08 ENCOUNTER — OUTPATIENT (OUTPATIENT)
Dept: OUTPATIENT SERVICES | Facility: HOSPITAL | Age: 70
LOS: 1 days | End: 2025-04-08
Payer: MEDICARE

## 2025-04-08 DIAGNOSIS — R18.0 MALIGNANT ASCITES: ICD-10-CM

## 2025-04-08 DIAGNOSIS — R63.4 ABNORMAL WEIGHT LOSS: ICD-10-CM

## 2025-04-08 DIAGNOSIS — C68.1: ICD-10-CM

## 2025-04-08 DIAGNOSIS — C78.6 SECONDARY MALIGNANT NEOPLASM OF RETROPERITONEUM AND PERITONEUM: ICD-10-CM

## 2025-04-08 DIAGNOSIS — R06.6 HICCOUGH: ICD-10-CM

## 2025-04-08 LAB — BLD GP AB SCN SERPL QL: SIGNIFICANT CHANGE UP

## 2025-04-08 PROCEDURE — 99215 OFFICE O/P EST HI 40 MIN: CPT

## 2025-04-08 RX ORDER — METOCLOPRAMIDE 10 MG/1
10 TABLET ORAL
Qty: 120 | Refills: 0 | Status: ACTIVE | COMMUNITY
Start: 2025-04-08 | End: 1900-01-01

## 2025-04-10 ENCOUNTER — OUTPATIENT (OUTPATIENT)
Dept: OUTPATIENT SERVICES | Facility: HOSPITAL | Age: 70
LOS: 1 days | End: 2025-04-10
Payer: MEDICARE

## 2025-04-10 ENCOUNTER — APPOINTMENT (OUTPATIENT)
Dept: CT IMAGING | Facility: CLINIC | Age: 70
End: 2025-04-10
Payer: MEDICARE

## 2025-04-10 DIAGNOSIS — Z98.890 OTHER SPECIFIED POSTPROCEDURAL STATES: Chronic | ICD-10-CM

## 2025-04-10 DIAGNOSIS — Z90.49 ACQUIRED ABSENCE OF OTHER SPECIFIED PARTS OF DIGESTIVE TRACT: Chronic | ICD-10-CM

## 2025-04-10 PROCEDURE — 71260 CT THORAX DX C+: CPT

## 2025-04-10 PROCEDURE — 74177 CT ABD & PELVIS W/CONTRAST: CPT

## 2025-04-10 PROCEDURE — 71260 CT THORAX DX C+: CPT | Mod: 26

## 2025-04-10 PROCEDURE — 74177 CT ABD & PELVIS W/CONTRAST: CPT | Mod: 26

## 2025-04-11 ENCOUNTER — APPOINTMENT (OUTPATIENT)
Age: 70
End: 2025-04-11

## 2025-04-11 PROCEDURE — P9016: CPT

## 2025-04-11 PROCEDURE — 36415 COLL VENOUS BLD VENIPUNCTURE: CPT

## 2025-04-11 PROCEDURE — 86900 BLOOD TYPING SEROLOGIC ABO: CPT

## 2025-04-11 PROCEDURE — 86923 COMPATIBILITY TEST ELECTRIC: CPT

## 2025-04-11 PROCEDURE — 86901 BLOOD TYPING SEROLOGIC RH(D): CPT

## 2025-04-11 PROCEDURE — 86850 RBC ANTIBODY SCREEN: CPT

## 2025-04-14 ENCOUNTER — RESULT REVIEW (OUTPATIENT)
Age: 70
End: 2025-04-14

## 2025-04-14 ENCOUNTER — APPOINTMENT (OUTPATIENT)
Dept: HEMATOLOGY ONCOLOGY | Facility: CLINIC | Age: 70
End: 2025-04-14

## 2025-04-14 LAB
ALBUMIN SERPL ELPH-MCNC: 3.1 G/DL
ALP BLD-CCNC: 107 U/L
ALT SERPL-CCNC: 6 U/L
ANION GAP SERPL CALC-SCNC: 13 MMOL/L
AST SERPL-CCNC: 15 U/L
BILIRUB SERPL-MCNC: 0.4 MG/DL
BUN SERPL-MCNC: 17 MG/DL
CALCIUM SERPL-MCNC: 8.8 MG/DL
CHLORIDE SERPL-SCNC: 103 MMOL/L
CO2 SERPL-SCNC: 22 MMOL/L
CREAT SERPL-MCNC: 1.03 MG/DL
EGFRCR SERPLBLD CKD-EPI 2021: 80 ML/MIN/1.73M2
GLUCOSE SERPL-MCNC: 175 MG/DL
MAGNESIUM SERPL-MCNC: 1.7 MG/DL
POTASSIUM SERPL-SCNC: 4.1 MMOL/L
PROT SERPL-MCNC: 6.6 G/DL
SODIUM SERPL-SCNC: 137 MMOL/L

## 2025-04-15 ENCOUNTER — APPOINTMENT (OUTPATIENT)
Dept: HEMATOLOGY ONCOLOGY | Facility: CLINIC | Age: 70
End: 2025-04-15
Payer: MEDICARE

## 2025-04-15 ENCOUNTER — APPOINTMENT (OUTPATIENT)
Age: 70
End: 2025-04-15

## 2025-04-15 DIAGNOSIS — R18.8 OTHER ASCITES: ICD-10-CM

## 2025-04-15 DIAGNOSIS — Z51.11 ENCOUNTER FOR ANTINEOPLASTIC CHEMOTHERAPY: ICD-10-CM

## 2025-04-15 DIAGNOSIS — C68.9 MALIGNANT NEOPLASM OF URINARY ORGAN, UNSPECIFIED: ICD-10-CM

## 2025-04-15 DIAGNOSIS — I26.99 OTHER PULMONARY EMBOLISM W/OUT ACUTE COR PULMONALE: ICD-10-CM

## 2025-04-15 PROCEDURE — 99215 OFFICE O/P EST HI 40 MIN: CPT

## 2025-04-15 RX ORDER — DEXAMETHASONE 4 MG/1
4 TABLET ORAL
Qty: 60 | Refills: 0 | Status: ACTIVE | COMMUNITY
Start: 2025-04-15 | End: 1900-01-01

## 2025-04-16 ENCOUNTER — RESULT REVIEW (OUTPATIENT)
Age: 70
End: 2025-04-16

## 2025-04-16 ENCOUNTER — APPOINTMENT (OUTPATIENT)
Dept: INTERVENTIONAL RADIOLOGY/VASCULAR | Facility: CLINIC | Age: 70
End: 2025-04-16

## 2025-04-22 ENCOUNTER — APPOINTMENT (OUTPATIENT)
Age: 70
End: 2025-04-22

## 2025-04-22 ENCOUNTER — RESULT REVIEW (OUTPATIENT)
Age: 70
End: 2025-04-22

## 2025-04-22 ENCOUNTER — APPOINTMENT (OUTPATIENT)
Dept: HEMATOLOGY ONCOLOGY | Facility: CLINIC | Age: 70
End: 2025-04-22

## 2025-04-23 ENCOUNTER — TRANSCRIPTION ENCOUNTER (OUTPATIENT)
Age: 70
End: 2025-04-23

## 2025-04-23 ENCOUNTER — RESULT REVIEW (OUTPATIENT)
Age: 70
End: 2025-04-23

## 2025-04-25 ENCOUNTER — APPOINTMENT (OUTPATIENT)
Dept: ENDOCRINOLOGY | Facility: CLINIC | Age: 70
End: 2025-04-25

## (undated) DEVICE — SOL IRR BAG NS 0.9% 3000ML

## (undated) DEVICE — VENODYNE/SCD SLEEVE CALF MEDIUM

## (undated) DEVICE — Device

## (undated) DEVICE — TUBING TUR 2 PRONG

## (undated) DEVICE — PACK CYSTOSCOPY TIBURON

## (undated) DEVICE — WARMING BLANKET UPPER ADULT

## (undated) DEVICE — GOWN XXL

## (undated) DEVICE — CLAMP BX URETERSP FLEX 1MM 15CM

## (undated) DEVICE — PORT BIOPSY

## (undated) DEVICE — GLV 8 PROTEXIS (WHITE)

## (undated) DEVICE — TUBING SUCTION 20FT

## (undated) DEVICE — SOL IRR BAG H2O 3000ML

## (undated) DEVICE — PRESSURE INFUSOR BAG 3000ML

## (undated) DEVICE — TUBING IRR SET FOR CYSTOSCOPY 77"

## (undated) DEVICE — IRR BULB PATHFINDER + 10"

## (undated) DEVICE — DRAPE C ARM UNIVERSAL

## (undated) DEVICE — VISITEC 4X4